# Patient Record
Sex: FEMALE | Race: WHITE | Employment: OTHER | URBAN - METROPOLITAN AREA
[De-identification: names, ages, dates, MRNs, and addresses within clinical notes are randomized per-mention and may not be internally consistent; named-entity substitution may affect disease eponyms.]

---

## 2017-02-23 ENCOUNTER — APPOINTMENT (OUTPATIENT)
Dept: INFUSION THERAPY | Age: 77
End: 2017-02-23

## 2017-05-23 ENCOUNTER — APPOINTMENT (OUTPATIENT)
Dept: GENERAL RADIOLOGY | Age: 77
End: 2017-05-23
Attending: EMERGENCY MEDICINE
Payer: MEDICARE

## 2017-05-23 ENCOUNTER — HOSPITAL ENCOUNTER (EMERGENCY)
Age: 77
Discharge: HOME | End: 2017-05-23
Attending: EMERGENCY MEDICINE | Admitting: EMERGENCY MEDICINE
Payer: MEDICARE

## 2017-05-23 VITALS
BODY MASS INDEX: 23.39 KG/M2 | WEIGHT: 137 LBS | DIASTOLIC BLOOD PRESSURE: 68 MMHG | HEIGHT: 64 IN | HEART RATE: 77 BPM | RESPIRATION RATE: 22 BRPM | OXYGEN SATURATION: 97 % | TEMPERATURE: 98.4 F | SYSTOLIC BLOOD PRESSURE: 140 MMHG

## 2017-05-23 DIAGNOSIS — R07.9 CHEST PAIN, UNSPECIFIED TYPE: Primary | ICD-10-CM

## 2017-05-23 LAB
ANION GAP BLD CALC-SCNC: 9 MMOL/L (ref 5–15)
BASOPHILS # BLD AUTO: 0 K/UL (ref 0–0.1)
BASOPHILS # BLD: 0 % (ref 0–1)
BUN SERPL-MCNC: 21 MG/DL (ref 6–20)
BUN/CREAT SERPL: 27 (ref 12–20)
CALCIUM SERPL-MCNC: 9.9 MG/DL (ref 8.5–10.1)
CHLORIDE SERPL-SCNC: 106 MMOL/L (ref 97–108)
CO2 SERPL-SCNC: 24 MMOL/L (ref 21–32)
CREAT SERPL-MCNC: 0.78 MG/DL (ref 0.55–1.02)
EOSINOPHIL # BLD: 0.1 K/UL (ref 0–0.4)
EOSINOPHIL NFR BLD: 2 % (ref 0–7)
ERYTHROCYTE [DISTWIDTH] IN BLOOD BY AUTOMATED COUNT: 12.9 % (ref 11.5–14.5)
GLUCOSE SERPL-MCNC: 115 MG/DL (ref 65–100)
HCT VFR BLD AUTO: 40.3 % (ref 35–47)
HGB BLD-MCNC: 13.2 G/DL (ref 11.5–16)
LYMPHOCYTES # BLD AUTO: 30 % (ref 12–49)
LYMPHOCYTES # BLD: 2 K/UL (ref 0.8–3.5)
MAGNESIUM SERPL-MCNC: 2.3 MG/DL (ref 1.6–2.4)
MCH RBC QN AUTO: 31.3 PG (ref 26–34)
MCHC RBC AUTO-ENTMCNC: 32.8 G/DL (ref 30–36.5)
MCV RBC AUTO: 95.5 FL (ref 80–99)
MONOCYTES # BLD: 0.7 K/UL (ref 0–1)
MONOCYTES NFR BLD AUTO: 10 % (ref 5–13)
NEUTS SEG # BLD: 4 K/UL (ref 1.8–8)
NEUTS SEG NFR BLD AUTO: 58 % (ref 32–75)
PLATELET # BLD AUTO: 231 K/UL (ref 150–400)
POTASSIUM SERPL-SCNC: 4 MMOL/L (ref 3.5–5.1)
RBC # BLD AUTO: 4.22 M/UL (ref 3.8–5.2)
SODIUM SERPL-SCNC: 139 MMOL/L (ref 136–145)
TROPONIN I SERPL-MCNC: <0.04 NG/ML
WBC # BLD AUTO: 6.8 K/UL (ref 3.6–11)

## 2017-05-23 PROCEDURE — 85025 COMPLETE CBC W/AUTO DIFF WBC: CPT | Performed by: EMERGENCY MEDICINE

## 2017-05-23 PROCEDURE — 93005 ELECTROCARDIOGRAM TRACING: CPT

## 2017-05-23 PROCEDURE — 99284 EMERGENCY DEPT VISIT MOD MDM: CPT

## 2017-05-23 PROCEDURE — 80048 BASIC METABOLIC PNL TOTAL CA: CPT | Performed by: EMERGENCY MEDICINE

## 2017-05-23 PROCEDURE — 71020 XR CHEST PA LAT: CPT

## 2017-05-23 PROCEDURE — 84484 ASSAY OF TROPONIN QUANT: CPT | Performed by: EMERGENCY MEDICINE

## 2017-05-23 PROCEDURE — 36415 COLL VENOUS BLD VENIPUNCTURE: CPT | Performed by: EMERGENCY MEDICINE

## 2017-05-23 PROCEDURE — 83735 ASSAY OF MAGNESIUM: CPT | Performed by: EMERGENCY MEDICINE

## 2017-05-24 LAB
ATRIAL RATE: 82 BPM
CALCULATED P AXIS, ECG09: 55 DEGREES
CALCULATED R AXIS, ECG10: -9 DEGREES
CALCULATED T AXIS, ECG11: 3 DEGREES
DIAGNOSIS, 93000: NORMAL
P-R INTERVAL, ECG05: 166 MS
Q-T INTERVAL, ECG07: 380 MS
QRS DURATION, ECG06: 94 MS
QTC CALCULATION (BEZET), ECG08: 443 MS
VENTRICULAR RATE, ECG03: 82 BPM

## 2017-05-24 NOTE — ED NOTES
ED EKG interpretation:  Rhythm: normal sinus rhythm; and regular . Rate (approx.): 82 bpm; Axis: normal; ST/T wave: normal; LVH.    Note written by Beth Thrasher, as dictated by Dr. Christi Espinoza MD 11:21 PM

## 2017-05-24 NOTE — ED NOTES
I have reviewed discharge instructions with the patient. The patient verbalized understanding.  Ambulatory off unit in no acute signs of distress

## 2017-05-24 NOTE — DISCHARGE INSTRUCTIONS
Chest Pain: Care Instructions  Your Care Instructions  There are many things that can cause chest pain. Some are not serious and will get better on their own in a few days. But some kinds of chest pain need more testing and treatment. Your doctor may have recommended a follow-up visit in the next 8 to 12 hours. If you are not getting better, you may need more tests or treatment. Even though your doctor has released you, you still need to watch for any problems. The doctor carefully checked you, but sometimes problems can develop later. If you have new symptoms or if your symptoms do not get better, get medical care right away. If you have worse or different chest pain or pressure that lasts more than 5 minutes or you passed out (lost consciousness), call 911 or seek other emergency help right away. A medical visit is only one step in your treatment. Even if you feel better, you still need to do what your doctor recommends, such as going to all suggested follow-up appointments and taking medicines exactly as directed. This will help you recover and help prevent future problems. How can you care for yourself at home? · Rest until you feel better. · Take your medicine exactly as prescribed. Call your doctor if you think you are having a problem with your medicine. · Do not drive after taking a prescription pain medicine. When should you call for help? Call 911 if:  · You passed out (lost consciousness). · You have severe difficulty breathing. · You have symptoms of a heart attack. These may include:  ¨ Chest pain or pressure, or a strange feeling in your chest.  ¨ Sweating. ¨ Shortness of breath. ¨ Nausea or vomiting. ¨ Pain, pressure, or a strange feeling in your back, neck, jaw, or upper belly or in one or both shoulders or arms. ¨ Lightheadedness or sudden weakness. ¨ A fast or irregular heartbeat.   After you call 911, the  may tell you to chew 1 adult-strength or 2 to 4 low-dose aspirin. Wait for an ambulance. Do not try to drive yourself. Call your doctor today if:  · You have any trouble breathing. · Your chest pain gets worse. · You are dizzy or lightheaded, or you feel like you may faint. · You are not getting better as expected. · You are having new or different chest pain. Where can you learn more? Go to http://kaycee-declan.info/. Enter A120 in the search box to learn more about \"Chest Pain: Care Instructions. \"  Current as of: May 27, 2016  Content Version: 11.2  © 3906-3271 [employer]. Care instructions adapted under license by [employer] (which disclaims liability or warranty for this information). If you have questions about a medical condition or this instruction, always ask your healthcare professional. [address] any warranty or liability for your use of this information. We hope that we have addressed all of your medical concerns. The examination and treatment you received in the Emergency Department were for an emergent problem and were not intended as complete care. It is important that you follow up with your healthcare provider(s) for ongoing care. If your symptoms worsen or do not improve as expected, and you are unable to reach your usual health care provider(s), you should return to the Emergency Department. Today's healthcare is undergoing tremendous change, and patient satisfaction surveys are one of the many tools to assess the quality of medical care. You may receive a survey from the [employer] organization regarding your experience in the Emergency Department. I hope that your experience has been completely positive, particularly the medical care that I provided. As such, please participate in the survey; anything less than excellent does not meet my expectations or intentions.         [facility] and [facility] participate in nationally recognized quality of care measures. If your blood pressure is greater than 120/80, as reported below, we urge that you seek medical care to address the potential of high blood pressure, commonly known as hypertension. Hypertension can be hereditary or can be caused by certain medical conditions, pain, stress, or \"white coat syndrome. \"       Please make an appointment with your health care provider(s) for follow up of your Emergency Department visit. VITALS:   Patient Vitals for the past 8 hrs:   Temp Pulse Resp BP SpO2   05/23/17 2139 - 91 20 141/85 94 %   05/23/17 2002 97.8 °F (36.6 °C) 81 18 137/81 95 %          Thank you for allowing us to provide you with medical care today. We realize that you have many choices for your emergency care needs. Please choose us in the future for any continued health care needs. Zach Hagan, [address] [place] [address] 20.   Office: [phone]            Recent Results (from the past 24 hour(s))   EKG, 12 LEAD, INITIAL    Collection Time: 05/23/17  8:19 PM   Result Value Ref Range    Ventricular Rate 82 BPM    Atrial Rate 82 BPM    P-R Interval 166 ms    QRS Duration 94 ms    Q-T Interval 380 ms    QTC Calculation (Bezet) 443 ms    Calculated P Axis 55 degrees    Calculated R Axis -9 degrees    Calculated T Axis 3 degrees    Diagnosis       Normal sinus rhythm  Left ventricular hypertrophy  No previous ECGs available     CBC WITH AUTOMATED DIFF    Collection Time: 05/23/17 10:04 PM   Result Value Ref Range    WBC 6.8 3.6 - 11.0 K/uL    RBC 4.22 3.80 - 5.20 M/uL    HGB 13.2 11.5 - 16.0 g/dL    HCT 40.3 35.0 - 47.0 %    MCV 95.5 80.0 - 99.0 FL    MCH 31.3 26.0 - 34.0 PG    MCHC 32.8 30.0 - 36.5 g/dL    RDW 12.9 11.5 - 14.5 %    PLATELET 720 444 - 111 K/uL    NEUTROPHILS 58 32 - 75 %    LYMPHOCYTES 30 12 - 49 %    MONOCYTES 10 5 - 13 %    EOSINOPHILS 2 0 - 7 %    BASOPHILS 0 0 - 1 %    ABS.  NEUTROPHILS 4.0 1.8 - 8.0 K/UL    ABS. LYMPHOCYTES 2.0 0.8 - 3.5 K/UL    ABS. MONOCYTES 0.7 0.0 - 1.0 K/UL    ABS. EOSINOPHILS 0.1 0.0 - 0.4 K/UL    ABS. BASOPHILS 0.0 0.0 - 0.1 K/UL   MAGNESIUM    Collection Time: 05/23/17 10:04 PM   Result Value Ref Range    Magnesium 2.3 1.6 - 2.4 mg/dL   METABOLIC PANEL, BASIC    Collection Time: 05/23/17 10:04 PM   Result Value Ref Range    Sodium 139 136 - 145 mmol/L    Potassium 4.0 3.5 - 5.1 mmol/L    Chloride 106 97 - 108 mmol/L    CO2 24 21 - 32 mmol/L    Anion gap 9 5 - 15 mmol/L    Glucose 115 (H) 65 - 100 mg/dL    BUN 21 (H) 6 - 20 MG/DL    Creatinine 0.78 0.55 - 1.02 MG/DL    BUN/Creatinine ratio 27 (H) 12 - 20      GFR est AA >60 >60 ml/min/1.73m2    GFR est non-AA >60 >60 ml/min/1.73m2    Calcium 9.9 8.5 - 10.1 MG/DL   TROPONIN I    Collection Time: 05/23/17 10:04 PM   Result Value Ref Range    Troponin-I, Qt. <0.04 <0.05 ng/mL       Xr Chest Pa Lat    Result Date: 5/23/2017  EXAM:  XR CHEST PA LAT INDICATION:  Chest pain. COMPARISON: None TECHNIQUE: PA and lateral chest views FINDINGS: Cardiac monitoring wires overlie the thorax. The cardiomediastinal and hilar contours are within normal limits. The pulmonary vasculature is within normal limits. The lungs and pleural spaces are clear. A calcified granuloma is noted in the right upper lung. The visualized bones and upper abdomen are age-appropriate. IMPRESSION: There is no acute process.

## 2017-05-24 NOTE — ED PROVIDER NOTES
HPI Comments: 67 y.o. female with past medical history significant for HTN and hypercholesterolemia who presents to the ED with chief complaint of vague chest sensation. Patient reports a \"weird sensation\" in her chest and bilateral arms that lasted for \"a few seconds\" with onset at ~1600 \"this afternoon. \" Patient reports \"two\" previous episodes of similar symptoms; reports experiencing one episode ~2 weeks ago and a second episode \"a few days after that. \" Patient reports \"it's really hard to describe. \" Patient also reports a slight headache with recent onset. Patient reports her late 's cardiologist was Dr. Dwight Leija MD. Patient denies pain, SOB, nausea, vomiting, diarrhea, constipation, diaphoresis, leg swelling, fever, chills, dysuria, and lightheadedness. There are no other acute medical concerns at this time. PCP: Farrukh Major MD    Note written by Beth Rebolledo, as dictated by Aida Prieto. Dank Armijo MD 9:42 PM     The history is provided by the patient. Past Medical History:   Diagnosis Date    Hypercholesteremia     Hypertension        Past Surgical History:   Procedure Laterality Date    HX ORTHOPAEDIC      trigger finger         History reviewed. No pertinent family history. Social History     Social History    Marital status:      Spouse name: N/A    Number of children: N/A    Years of education: N/A     Occupational History    Not on file. Social History Main Topics    Smoking status: Never Smoker    Smokeless tobacco: Not on file    Alcohol use No    Drug use: No    Sexual activity: Not on file     Other Topics Concern    Not on file     Social History Narrative         ALLERGIES: Aspirin; Cephalexin; and Penicillins    Review of Systems   Constitutional: Negative for chills, diaphoresis and fever. HENT: Negative for ear pain and sore throat. Eyes: Negative for pain. Respiratory: Negative for chest tightness and shortness of breath. Cardiovascular: Negative for chest pain and leg swelling. Gastrointestinal: Negative for abdominal pain, constipation, diarrhea, nausea and vomiting. Genitourinary: Negative for dysuria and flank pain. Musculoskeletal: Negative for back pain. Skin: Negative for rash. Neurological: Positive for headaches. Negative for light-headedness. All other systems reviewed and are negative. Vitals:    05/23/17 2002 05/23/17 2139   BP:  141/85   Pulse:  91   Resp:  20   SpO2:  94%   Weight: 62.1 kg (137 lb)    Height: 5' 4\" (1.626 m)             Physical Exam   Constitutional: She appears well-developed and well-nourished. HENT:   Head: Normocephalic and atraumatic. Mouth/Throat: Oropharynx is clear and moist.   Eyes: Conjunctivae and EOM are normal. Pupils are equal, round, and reactive to light. No scleral icterus. Neck: Neck supple. No tracheal deviation present. Cardiovascular: Normal rate, regular rhythm, normal heart sounds and intact distal pulses. Regular rate and rhythm. Pulmonary/Chest: Effort normal and breath sounds normal. No respiratory distress. Lungs are clear. Abdominal: Soft. She exhibits no distension. There is no tenderness. There is no rebound and no guarding. Genitourinary:   Genitourinary Comments: deferred   Musculoskeletal: She exhibits no edema. Neurological: She is alert. Skin: Skin is warm and dry. Psychiatric: She has a normal mood and affect. Nursing note and vitals reviewed. Note written by Beth Goetz, as dictated by Cheryl Calderón. Jennifer Obregon MD 10:07 PM      MDM  Number of Diagnoses or Management Options  Chest pain, unspecified type:   Diagnosis management comments: DDx: ACS,MI, GERD    ED Course     11:19 PM  Pt has been reevaluated. The patient is ready for discharge. The patient's signs, symptoms, diagnosis, and discharge instructions have been discussed and the patient/ family has conveyed their understanding.  The patient is to follow up as recommended or return to the ER should their symptoms worsen. Plan has been discussed and the patient is in agreement. LABORATORY TESTS:  Recent Results (from the past 12 hour(s))   EKG, 12 LEAD, INITIAL    Collection Time: 05/23/17  8:19 PM   Result Value Ref Range    Ventricular Rate 82 BPM    Atrial Rate 82 BPM    P-R Interval 166 ms    QRS Duration 94 ms    Q-T Interval 380 ms    QTC Calculation (Bezet) 443 ms    Calculated P Axis 55 degrees    Calculated R Axis -9 degrees    Calculated T Axis 3 degrees    Diagnosis       Normal sinus rhythm  Left ventricular hypertrophy  No previous ECGs available     CBC WITH AUTOMATED DIFF    Collection Time: 05/23/17 10:04 PM   Result Value Ref Range    WBC 6.8 3.6 - 11.0 K/uL    RBC 4.22 3.80 - 5.20 M/uL    HGB 13.2 11.5 - 16.0 g/dL    HCT 40.3 35.0 - 47.0 %    MCV 95.5 80.0 - 99.0 FL    MCH 31.3 26.0 - 34.0 PG    MCHC 32.8 30.0 - 36.5 g/dL    RDW 12.9 11.5 - 14.5 %    PLATELET 825 127 - 006 K/uL    NEUTROPHILS 58 32 - 75 %    LYMPHOCYTES 30 12 - 49 %    MONOCYTES 10 5 - 13 %    EOSINOPHILS 2 0 - 7 %    BASOPHILS 0 0 - 1 %    ABS. NEUTROPHILS 4.0 1.8 - 8.0 K/UL    ABS. LYMPHOCYTES 2.0 0.8 - 3.5 K/UL    ABS. MONOCYTES 0.7 0.0 - 1.0 K/UL    ABS. EOSINOPHILS 0.1 0.0 - 0.4 K/UL    ABS.  BASOPHILS 0.0 0.0 - 0.1 K/UL   MAGNESIUM    Collection Time: 05/23/17 10:04 PM   Result Value Ref Range    Magnesium 2.3 1.6 - 2.4 mg/dL   METABOLIC PANEL, BASIC    Collection Time: 05/23/17 10:04 PM   Result Value Ref Range    Sodium 139 136 - 145 mmol/L    Potassium 4.0 3.5 - 5.1 mmol/L    Chloride 106 97 - 108 mmol/L    CO2 24 21 - 32 mmol/L    Anion gap 9 5 - 15 mmol/L    Glucose 115 (H) 65 - 100 mg/dL    BUN 21 (H) 6 - 20 MG/DL    Creatinine 0.78 0.55 - 1.02 MG/DL    BUN/Creatinine ratio 27 (H) 12 - 20      GFR est AA >60 >60 ml/min/1.73m2    GFR est non-AA >60 >60 ml/min/1.73m2    Calcium 9.9 8.5 - 10.1 MG/DL   TROPONIN I    Collection Time: 05/23/17 10:04 PM   Result Value Ref Range    Troponin-I, Qt. <0.04 <0.05 ng/mL       IMAGING RESULTS:  XR CHEST PA LAT   Final Result        Xr Chest Pa Lat    Result Date: 5/23/2017  EXAM:  XR CHEST PA LAT INDICATION:  Chest pain. COMPARISON: None TECHNIQUE: PA and lateral chest views FINDINGS: Cardiac monitoring wires overlie the thorax. The cardiomediastinal and hilar contours are within normal limits. The pulmonary vasculature is within normal limits. The lungs and pleural spaces are clear. A calcified granuloma is noted in the right upper lung. The visualized bones and upper abdomen are age-appropriate. IMPRESSION: There is no acute process. MEDICATIONS GIVEN:  Medications - No data to display    IMPRESSION:  1. Chest pain, unspecified type        PLAN:  1. Discharge Medication List as of 5/23/2017 11:00 PM        2. Follow-up Information     Follow up With Details Comments Contact Info    Josué Wells MD Call in 1 day  [address]  [address] [place] [address]      Yen Berrios MD Call in 1 day  [address] [facility] [address] [facility] [address]  [phone]      [facility] [address] [facility]  If symptoms worsen [address] [facility]  [phone]            Return to ED for new or worsening symptoms       Ailyn Jarquin.  Lele Aguirre MD    Procedures

## 2017-05-24 NOTE — ED TRIAGE NOTES
Pt reports she has gotten this \"sensation\" in her chest and arms 3 times over the last 3 weeks. \"Its really hard to describe. Just out of no where I get this feeling in my chest and both arms. Then it goes right away. \"  Will not use the term \"pain\" or \"discomfort\", its just a \"sensation. \"  No pain or sensation currently. Last episode occurred around 1600 today while patient was taking her shoes off. She is here because she told her family and they want her checked.

## 2017-05-25 ENCOUNTER — OFFICE VISIT (OUTPATIENT)
Dept: CARDIOLOGY CLINIC | Age: 77
End: 2017-05-25

## 2017-05-25 VITALS
HEART RATE: 96 BPM | DIASTOLIC BLOOD PRESSURE: 70 MMHG | OXYGEN SATURATION: 97 % | WEIGHT: 135.6 LBS | SYSTOLIC BLOOD PRESSURE: 120 MMHG | HEIGHT: 64 IN | RESPIRATION RATE: 16 BRPM | BODY MASS INDEX: 23.15 KG/M2

## 2017-05-25 DIAGNOSIS — R07.2 PRECORDIAL PAIN: Primary | ICD-10-CM

## 2017-05-25 DIAGNOSIS — E78.2 MIXED HYPERLIPIDEMIA: ICD-10-CM

## 2017-05-25 DIAGNOSIS — I10 ESSENTIAL HYPERTENSION: ICD-10-CM

## 2017-05-25 NOTE — PROGRESS NOTES
HISTORY OF PRESENT ILLNESS  Maurice Quezada is a 67 y.o. female     SUMMARY:   Problem List  Date Reviewed: 5/25/2017          Codes Class Noted    Precordial pain ICD-10-CM: R07.2  ICD-9-CM: 786.51  5/25/2017        Mixed hyperlipidemia ICD-10-CM: E78.2  ICD-9-CM: 272.2  5/25/2017        Essential hypertension ICD-10-CM: I10  ICD-9-CM: 401.9  5/25/2017              Current Outpatient Prescriptions on File Prior to Visit   Medication Sig    atorvastatin (LIPITOR) 20 mg tablet Take 20 mg by mouth daily.  acetaminophen (TYLENOL) 325 mg tablet Take 650 mg by mouth three (3) times daily (after meals).  lisinopril (PRINIVIL, ZESTRIL) 10 mg tablet Take 10 mg by mouth daily.  furosemide (LASIX) 40 mg tablet Take  by mouth daily as needed.  multivitamin with iron tablet Take 1 Tab by mouth daily. No current facility-administered medications on file prior to visit. CARDIOLOGY STUDIES TO DATE:  none  Chief Complaint   Patient presents with    Chest Pain     HPI :  Ms. Caron Wahl is a 67year old referred from the [facility] ER for cardiac evaluation. Over the last three weeks she has had about three episodes lasting a few seconds where she gets a strange sensation across her upper/lower neck and chest which spreads out to both arms. It is not really pain or pressure, and it is not associated with any other symptoms. It is non-exertional. Nothing seems to make it better or worse. Interestingly she has pretty significant lumbar spine disease but no known cervical spine disease. She also has a history of GERD which is fairly well-controlled on over-the-counter medications. She has longstanding hypertension and hyperlipidemia, she has never smoked, there is no history of diabetes, and family history is negative for premature coronary disease. She tries to stay active but does not exercise, and no symptoms suggestive of exertional angina or congestive heart failure.  She ended up in the emergency room a few days ago where she had a normal lab evaluation and an EKG which showed sinus rhythm, left ventricular hypertrophy, and non-specific ST-T wave changes. CARDIAC ROS:   negative for dyspnea, palpitations, syncope, orthopnea, paroxysmal nocturnal dyspnea, exertional chest pressure/discomfort, claudication, lower extremity edema    Family History   Problem Relation Age of Onset    Stroke Mother     Heart Attack Father      age [de-identified]       Past Medical History:   Diagnosis Date    Hypercholesteremia     Hypertension        GENERAL ROS:  A comprehensive review of systems was negative except for: Musculoskeletal: positive for arthralgias  Behvioral/Psych: positive for anxiety and sleep disturbance    Visit Vitals    /70 (BP 1 Location: Left arm, BP Patient Position: Sitting)    Pulse 96    Resp 16    Ht 5' 4\" (1.626 m)    Wt 135 lb 9.6 oz (61.5 kg)    SpO2 97%    BMI 23.28 kg/m2       Wt Readings from Last 3 Encounters:   05/25/17 135 lb 9.6 oz (61.5 kg)   05/23/17 137 lb (62.1 kg)            BP Readings from Last 3 Encounters:   05/25/17 120/70   05/23/17 140/68       PHYSICAL EXAM  General appearance: alert, cooperative, no distress, appears stated age  Neurologic: Alert and oriented X 3  Neck: supple, symmetrical, trachea midline, no adenopathy, no carotid bruit and no JVD  Lungs: clear to auscultation bilaterally  Heart: regular rate and rhythm, S1, S2 normal, no murmur, click, rub or gallop  Abdomen: soft, non-tender. Bowel sounds normal. No masses,  no organomegaly  Extremities: extremities normal, atraumatic, no cyanosis or edema  Pulses: 2+ and symmetric      ASSESSMENT  Ms. Susie Paulino' symptoms are not really cardiac as best I can tell, though she does not get a lot of regular exercise, and she is quite concerned about this. I think this may turn out to be anxiety or possibly neurologic, and I am wondering if she has developed some cervical spine disease given her history of lower back issues.  That being said we need to do a stress echo on her to sort all of this out and further stratify her. Hopefully she will be able to walk on the treadmill, otherwise we will have to do a pharmacologic nuclear stress test, and I talked to her and her daughter about that at length. current treatment plan is effective, no change in therapy  lab results and schedule of future lab studies reviewed with patient  reviewed diet, exercise and weight control    Encounter Diagnoses   Name Primary?  Precordial pain Yes    Mixed hyperlipidemia     Essential hypertension      No orders of the defined types were placed in this encounter. Follow-up Disposition:  Return in about 4 weeks (around 6/22/2017).     Yi Chávez MD  5/25/2017

## 2017-05-25 NOTE — MR AVS SNAPSHOT
Visit Information Date & Time Provider Department Dept. Phone Encounter #  
 5/25/2017  8:20 AM Erika Traylor MD [facility] Julio [phone] [id number] Follow-up Instructions Return in about 4 weeks (around 6/22/2017). Upcoming Health Maintenance Date Due DTaP/Tdap/Td series (1 - Tdap) 11/22/1960 ZOSTER VACCINE AGE 60> 11/22/1999 GLAUCOMA SCREENING Q2Y 11/22/2004 Pneumococcal 65+ Low/Medium Risk (1 of 2 - PCV13) 11/22/2004 MEDICARE YEARLY EXAM 11/22/2004 INFLUENZA AGE 9 TO ADULT 8/1/2017 Allergies as of 5/25/2017  Review Complete On: 5/25/2017 By: Erika Traylor MD  
  
 Severity Noted Reaction Type Reactions Aspirin  03/08/2016    Other (comments)  
 ulcers Cephalexin  03/08/2016    Hives Penicillins  12/13/2011    Anaphylaxis Current Immunizations  Never Reviewed No immunizations on file. Not reviewed this visit You Were Diagnosed With   
  
 Codes Comments Precordial pain    -  Primary ICD-10-CM: R07.2 ICD-9-CM: 786.51 Mixed hyperlipidemia     ICD-10-CM: E78.2 ICD-9-CM: 272.2 Essential hypertension     ICD-10-CM: I10 
ICD-9-CM: 401.9 Vitals BP Pulse Resp Height(growth percentile) Weight(growth percentile) SpO2  
 120/70 (BP 1 Location: Left arm, BP Patient Position: Sitting) 96 16 5' 4\" (1.626 m) 135 lb 9.6 oz (61.5 kg) 97% BMI OB Status Smoking Status 23.28 kg/m2 Menopause Never Smoker BMI and BSA Data Body Mass Index Body Surface Area  
 23.28 kg/m 2 1.67 m 2 Preferred Pharmacy Pharmacy Name Phone [phone] [phone] Your Updated Medication List  
  
   
This list is accurate as of: 5/25/17  8:53 AM.  Always use your most recent med list.  
  
  
  
  
 acetaminophen 325 mg tablet Commonly known as:  TYLENOL  
 Take 650 mg by mouth three (3) times daily (after meals). atorvastatin 20 mg tablet Commonly known as:  LIPITOR Take 20 mg by mouth daily. furosemide 40 mg tablet Commonly known as:  LASIX Take  by mouth daily as needed. lisinopril 10 mg tablet Commonly known as:  Jovon Lofty Take 10 mg by mouth daily. multivitamin with iron tablet Take 1 Tab by mouth daily. Follow-up Instructions Return in about 4 weeks (around 6/22/2017). Introducing [facility] SERVICES! Dear Jhon Madrid: Thank you for requesting a [employer] account. Our records indicate that you have previously registered for a [employer] account but its currently inactive. Please call our [employer] support line at [phone]. Additional Information If you have questions, please visit the Frequently Asked Questions section of the [employer] website at [email or web address] Remember, [employer] is NOT to be used for urgent needs. For medical emergencies, dial 911. Now available from your iPhone and Android! Please provide this summary of care documentation to your next provider. Your primary care clinician is listed as Tina Knapp. If you have any questions after today's visit, please call [phone].

## 2017-06-12 ENCOUNTER — CLINICAL SUPPORT (OUTPATIENT)
Dept: CARDIOLOGY CLINIC | Age: 77
End: 2017-06-12

## 2017-06-12 DIAGNOSIS — I10 ESSENTIAL HYPERTENSION: ICD-10-CM

## 2017-06-12 DIAGNOSIS — E78.2 MIXED HYPERLIPIDEMIA: ICD-10-CM

## 2017-06-12 DIAGNOSIS — R07.2 PRECORDIAL PAIN: ICD-10-CM

## 2019-05-07 ENCOUNTER — HOSPITAL ENCOUNTER (OUTPATIENT)
Dept: WOUND CARE | Age: 79
Discharge: HOME | End: 2019-05-07
Payer: MEDICARE

## 2019-05-07 VITALS
RESPIRATION RATE: 16 BRPM | TEMPERATURE: 96.9 F | SYSTOLIC BLOOD PRESSURE: 130 MMHG | HEART RATE: 79 BPM | DIASTOLIC BLOOD PRESSURE: 72 MMHG

## 2019-05-07 PROCEDURE — 99213 OFFICE O/P EST LOW 20 MIN: CPT

## 2019-05-07 PROCEDURE — 97597 DBRDMT OPN WND 1ST 20 CM/<: CPT

## 2019-05-07 RX ORDER — FAMOTIDINE 10 MG/1
10 TABLET, FILM COATED ORAL DAILY
COMMUNITY
End: 2021-08-24

## 2019-05-07 RX ORDER — LIDOCAINE 40 MG/G
CREAM TOPICAL AS NEEDED
Status: DISCONTINUED | OUTPATIENT
Start: 2019-05-07 | End: 2019-05-11 | Stop reason: HOSPADM

## 2019-05-07 NOTE — WOUND CARE
05/07/19 0744 Wound Leg Right; Lower; Posterior Date First Assessed/Time First Assessed: 05/07/19 0919   Present on Hospital Admission: Yes  Primary Wound Type: Trauma  Location: Leg  Wound Location Orientation: Right; Lower; Posterior Dressing Status Removed Dressing Type  
(border gauze) Wound Length (cm) 1 cm Wound Width (cm) 0.8 cm Wound Depth (cm) 0.1 cm Wound Volume (cm^3) 0.08 cm^3 Condition of [facility] Condition of Edges Open Drainage Amount Small Drainage Color Serous Wound Odor None Dena-wound Assessment Intact Cleansing and Cleansing Agents  Normal saline Visit Vitals /72 (BP 1 Location: Left arm) Pulse 79 Temp 96.9 °F (36.1 °C) Resp 16

## 2019-05-07 NOTE — WOUND CARE
05/07/19 1039 Wound Leg Right; Lower; Posterior Date First Assessed/Time First Assessed: 05/07/19 0919   Present on Hospital Admission: Yes  Primary Wound Type: Trauma  Location: Leg  Wound Location Orientation: Right; Lower; Posterior Dressing Type Applied Silver products; Alginate;Gauze; Compression Wrap/Venous Stasis (Aquacel Ag, 3 layer A&D) Patient was discharged with Self to home and was ambulatory. In stable condition with c/o pain:_0_/10_

## 2019-05-08 NOTE — PROGRESS NOTES
[facility] 
WOUND CARE PROGRESS NOTE Name:  Oma Crenshaw 
MR#:  [id number] :  1940 ACCOUNT #:  [de-identified] DATE OF SERVICE:  2019 SUBJECTIVE:  The patient returns to the wound center after a prolonged absence with a new right leg venous stasis ulcer. She has a history of multiple venous stasis ulcers of the right leg. This one has been present for 4 months. She believes she banged her leg on a door or something. She denies fevers or chills. She has been treating it with antibiotic ointment. OBJECTIVE:  Physical exam reveals an approximately 1 cm ulcer of the right anterior leg. The base has thick yellow slough. There is mild surrounding erythema. There are obvious chronic venous stasis changes. She has good pedal pulses. ASSESSMENT:  New right leg venous stasis ulcer. Requires selective debridement. PROCEDURE:  Selective debridement. DESCRIPTION OF PROCEDURE:  Under topical anesthesia, nonviable tissue was sharply excised from the base of the right leg ulcer using a ring curette measuring about 1 cm2. Blood loss was less than 5 mL, no specimens or complications. PLAN:  Start wound care with Aquacel AG and three layer wrap. She will follow up next week for a nursing visit. I will see her again in 2 weeks. MD CATHLEEN [id number] 
D:  2019 10:49 T:  2019 10:55 JOB #:  [id number]

## 2019-05-14 ENCOUNTER — HOSPITAL ENCOUNTER (OUTPATIENT)
Dept: WOUND CARE | Age: 79
Discharge: HOME | End: 2019-05-14
Payer: MEDICARE

## 2019-05-14 VITALS
HEART RATE: 81 BPM | DIASTOLIC BLOOD PRESSURE: 76 MMHG | RESPIRATION RATE: 16 BRPM | TEMPERATURE: 97.4 F | SYSTOLIC BLOOD PRESSURE: 131 MMHG

## 2019-05-14 PROCEDURE — 29581 APPL MULTLAYER CMPRN SYS LEG: CPT

## 2019-05-14 NOTE — WOUND CARE
05/14/19 1013 Wound Leg Right; Lower; Posterior Date First Assessed/Time First Assessed: 05/07/19 0919   Present on Hospital Admission: Yes  Primary Wound Type: Trauma  Location: Leg  Wound Location Orientation: Right; Lower; Posterior Dressing Status Removed Dressing Type Compression Wrap/Venous Stasis; Aquacel;4 x 4 Condition of Base Slough;Granulation Condition of Edges Open Drainage Amount Moderate Drainage Color Tan Wound Odor None Dena-wound Assessment Intact Cleansing and Cleansing Agents  Normal saline Dressing Type Applied Silver products; Alginate;4 x 4;Compression Wrap/Venous Stasis 
(A&D , aquacel Ag, 3 layers) Visit Vitals /76 Pulse 81 Temp 97.4 °F (36.3 °C) Resp 16 RLE Peripheral Vascular Capillary Refill: Less than/equal to 3 seconds (05/14/19 1013) Color: Appropriate for race;Pink(Hemosidrin staining) (05/14/19 1013) Temperature: Warm (05/14/19 1013) Sensation: Present (05/14/19 1013) Pedal Pulse: Palpable (05/14/19 1013) Circumference of Calf (cm): 32 cm (05/14/19 1013) Location of Measurement (Calf): Mid  (05/14/19 1013) Circumference of Ankle (cm): 19.5 cm (05/14/19 1013) Location of Measurement (Ankle): Upper  (05/14/19 1013) Patient in for Nurse visit. Patient was discharged with Self to home and was ambulatory. In stable condition with c/o pain:0__/10_

## 2019-05-21 ENCOUNTER — HOSPITAL ENCOUNTER (OUTPATIENT)
Dept: WOUND CARE | Age: 79
Discharge: HOME | End: 2019-05-21
Payer: MEDICARE

## 2019-05-21 VITALS
SYSTOLIC BLOOD PRESSURE: 136 MMHG | DIASTOLIC BLOOD PRESSURE: 82 MMHG | RESPIRATION RATE: 16 BRPM | TEMPERATURE: 96.6 F | HEART RATE: 88 BPM

## 2019-05-21 PROCEDURE — 97597 DBRDMT OPN WND 1ST 20 CM/<: CPT

## 2019-05-21 PROCEDURE — 74011000250 HC RX REV CODE- 250: Performed by: PLASTIC SURGERY

## 2019-05-21 RX ADMIN — Medication: at 10:30

## 2019-05-21 NOTE — WOUND CARE
05/21/19 1036 Wound Leg Right; Lower; Posterior Date First Assessed/Time First Assessed: 05/07/19 0919   Present on Hospital Admission: Yes  Primary Wound Type: Trauma  Location: Leg  Wound Location Orientation: Right; Lower; Posterior Dressing Type Applied (aquacel AG, 4x4's 3 layer wrap) Wound Procedure Type Selective Debridement Procedure Time Out 8786 Consent Obtained  Yes Procedure Bleeding Minimal  
Procedure Hemostasis  Pressure Procedure Instrument  Curette Post-Procedure Length (cm) 1.3 cm Post-Procedure Width (cm) 1.4 cm Post-Procedure Depth (cm) 0.1 cm Post-Procedure Volume (cm^3) 0.18 cm^3 Post-Procedure Surface Area (cm^2) 1.82 cm^2 Procedure Tolerated Fair Discharged from wound center, ambulatory. Wound sensitive, Has follow up appointment in 1 week.

## 2019-05-21 NOTE — WOUND CARE
05/21/19 1014 Wound Leg Right; Lower; Posterior Date First Assessed/Time First Assessed: 05/07/19 0919   Present on Hospital Admission: Yes  Primary Wound Type: Trauma  Location: Leg  Wound Location Orientation: Right; Lower; Posterior Dressing Status Removed Dressing Type Aquacel; Compression Wrap/Venous Stasis Wound Length (cm) 1.3 cm Wound Width (cm) 1.4 cm Wound Depth (cm) 0.1 cm Wound Volume (cm^3) 0.18 cm^3 Condition of Base Slough;Granulation Condition of Edges Open Drainage Amount Moderate Drainage Color Tan;Serosanguinous Wound Odor None Dena-wound Assessment Intact Cleansing and Cleansing Agents  Normal saline Visit Vitals /82 Pulse 88 Temp 96.6 °F (35.9 °C) Resp 16

## 2019-05-28 ENCOUNTER — HOSPITAL ENCOUNTER (OUTPATIENT)
Dept: WOUND CARE | Age: 79
Discharge: HOME | End: 2019-05-28
Payer: MEDICARE

## 2019-05-28 VITALS
DIASTOLIC BLOOD PRESSURE: 86 MMHG | RESPIRATION RATE: 16 BRPM | TEMPERATURE: 97.7 F | SYSTOLIC BLOOD PRESSURE: 157 MMHG | HEART RATE: 80 BPM

## 2019-05-28 PROCEDURE — 74011000250 HC RX REV CODE- 250: Performed by: PLASTIC SURGERY

## 2019-05-28 PROCEDURE — 97597 DBRDMT OPN WND 1ST 20 CM/<: CPT

## 2019-05-28 RX ADMIN — Medication: at 11:06

## 2019-05-28 NOTE — WOUND CARE
OUTPATIENT WOUND CARE DISCHARGE NOTE 
 
 
 05/28/19 1125 Wound Leg Right; Lower; Posterior Date First Assessed/Time First Assessed: 05/07/19 0919   Present on Hospital Admission: Yes  Primary Wound Type: Trauma  Location: Leg  Wound Location Orientation: Right; Lower; Posterior Cleansing and Cleansing Agents  Normal saline Dressing Type Applied Alginate;Silver products;4 x 4;Compression Wrap/Venous Stasis (Aquacel Ag, 4x4s, 3 layer compression wrap.) Wound Procedure Type Selective Debridement Procedure Time Out 4672 Consent Obtained  Yes Procedure Bleeding Minimal  
Procedure Hemostasis  Pressure Type of Tissue Removed  Devitalized Procedure Instrument  Curette Procedure Pain Scale Numeric 0/10 Debridement Procedure Performed by Dr. Humberto Gimenez Post-Procedure Length (cm) 1.3 cm Post-Procedure Width (cm) 1 cm Post-Procedure Depth (cm) 0.1 cm Post-Procedure Volume (cm^3) 0.13 cm^3 Post-Procedure Surface Area (cm^2) 1.3 cm^2 Post Procedure Pain Scale Numeric 0/10 Procedure Tolerated Well Wound Dressing Applied - Per order, as noted above. Pain - 0 Ambulatory Aid - None Accompanied by - Self Follow Up Appointments - Nurse visit in 1 week. F/U w/Dr. Humberto Gimenez in 3 weeks (following her vacation). Discharge Instructions Reviewed. Instructed to call The [address] with any questions or concerns. Patient Verbalizes understanding.

## 2019-05-28 NOTE — WOUND CARE
05/28/19 1100 Wound Leg Right; Lower; Posterior Date First Assessed/Time First Assessed: 05/07/19 0919   Present on Hospital Admission: Yes  Primary Wound Type: Trauma  Location: Leg  Wound Location Orientation: Right; Lower; Posterior Dressing Status Removed Dressing Type Aquacel; Compression Wrap/Venous Stasis;4 x 4 Wound Length (cm) 1.3 cm Wound Width (cm) 1 cm Wound Depth (cm) 0.1 cm Wound Volume (cm^3) 0.13 cm^3 Condition of Base Granulation;Slough Condition of Edges Open Drainage Amount Small Drainage Color Serosanguinous Wound Odor None Dena-wound Assessment Intact

## 2019-06-04 ENCOUNTER — HOSPITAL ENCOUNTER (OUTPATIENT)
Dept: WOUND CARE | Age: 79
Discharge: HOME | End: 2019-06-04
Payer: MEDICARE

## 2019-06-04 VITALS
TEMPERATURE: 98 F | RESPIRATION RATE: 16 BRPM | DIASTOLIC BLOOD PRESSURE: 87 MMHG | SYSTOLIC BLOOD PRESSURE: 151 MMHG | HEART RATE: 81 BPM

## 2019-06-04 PROCEDURE — 99211 OFF/OP EST MAY X REQ PHY/QHP: CPT

## 2019-06-04 NOTE — WOUND CARE
06/04/19 1037   Wound Leg Right; Lower; Posterior   Date First Assessed/Time First Assessed: 05/07/19 0919   Present on Hospital Admission: Yes  Primary Wound Type: Trauma  Location: Leg  Wound Location Orientation: Right; Lower; Posterior   Dressing Status Removed   Dressing Type Aquacel; Compression Wrap/Venous Stasis   Condition of Base Pink   Condition of Edges Open   Drainage Amount Scant   Drainage Color Serosanguinous   Wound Odor None   Dena-wound Assessment Intact   Cleansing and Cleansing Agents  Normal saline   Dressing Type Applied Gauze wrap (mirta); Special tape (comment)  (Aquacel Ag,A&D intact skin, tubi E X 2)     Patient for Nurse Visit. Visit Vitals  /87 (BP 1 Location: Left arm, BP Patient Position: Post activity;Standing)   Pulse 81   Temp 98 °F (36.7 °C)   Resp 16        RLE Peripheral Vascular   Capillary Refill: Less than/equal to 3 seconds (06/04/19 1057)  Color: Appropriate for race (06/04/19 1057)  Temperature: Warm (06/04/19 1057)  Sensation: Present (06/04/19 1057)  Pedal Pulse: Present (06/04/19 1057)      Patient was discharged with Self to home and was ambulatory.  In stable condition with c/o pain:__0/10_

## 2019-06-06 ENCOUNTER — HOSPITAL ENCOUNTER (OUTPATIENT)
Dept: WOUND CARE | Age: 79
Discharge: HOME | End: 2019-06-06
Payer: MEDICARE

## 2019-06-06 PROCEDURE — 99211 OFF/OP EST MAY X REQ PHY/QHP: CPT

## 2019-06-06 NOTE — WOUND CARE
06/06/19 1305 Wound Leg Right; Lower; Posterior Date First Assessed/Time First Assessed: 05/07/19 0919   Present on Hospital Admission: Yes  Primary Wound Type: Trauma  Location: Leg  Wound Location Orientation: Right; Lower; Posterior Dressing Type Aquacel;Gauze;Gauze wrap (mirta); Special tape (comment) 
(tubi x2) Dressing Type Applied Xeroform;Gauze;Gauze wrap (mirta); Special tape (comment) (Tubi X 2) Patient was discharged with Self to home and was ambulatory. In stable condition with c/o pain:_0_/10_

## 2019-06-18 ENCOUNTER — HOSPITAL ENCOUNTER (OUTPATIENT)
Dept: WOUND CARE | Age: 79
Discharge: HOME | End: 2019-06-18
Payer: MEDICARE

## 2019-06-18 VITALS
HEART RATE: 79 BPM | DIASTOLIC BLOOD PRESSURE: 79 MMHG | SYSTOLIC BLOOD PRESSURE: 134 MMHG | RESPIRATION RATE: 16 BRPM | TEMPERATURE: 97.6 F

## 2019-06-18 PROCEDURE — 99214 OFFICE O/P EST MOD 30 MIN: CPT

## 2019-06-18 NOTE — WOUND CARE
OUTPATIENT WOUND CARE DISCHARGE NOTE 
 
 
 06/18/19 1040 Wound Leg Right; Lower; Posterior Date First Assessed/Time First Assessed: 05/07/19 0919   Present on Hospital Admission: Yes  Primary Wound Type: Trauma  Location: Leg  Wound Location Orientation: Right; Lower; Posterior Dressing Type Applied Other (Comment) (No wound dsg needed. Double layer Tubi-, size E, only.) Wound Dressing Applied - No dressing needed. Wound has heeled. Double layer Tubi-, size E, applied per order. Patient instructed to wear bilateral compression hose daily. Pain -  Denies pain. Ambulatory Aid - None Accompanied by - Self Follow Up Appointments - No follow up needed at this time. Wound has healed. Discharge Instructions Reviewed. Instructed to call The [address] [place] [address] Road with any questions or concerns. Patient Verbalizes understanding. Patient discharged from [address] [place] [address].

## 2019-06-18 NOTE — WOUND CARE
06/18/19 1028 Wound Leg Right; Lower; Posterior Date First Assessed/Time First Assessed: 05/07/19 0919   Present on Hospital Admission: Yes  Primary Wound Type: Trauma  Location: Leg  Wound Location Orientation: Right; Lower; Posterior Dressing Status Removed Dressing Type  
(Xeroform, roll gauze, tubigrip) Wound Length (cm) 0 cm Wound Width (cm) 0 cm Wound Depth (cm) 0 cm Wound Volume (cm^3) 0 cm^3 Condition of Base Epithelializing Condition of Edges Open Drainage Amount None Wound Odor None Dena-wound Assessment Intact Cleansing and Cleansing Agents  Normal saline; Soap and water Visit Vitals /79 (BP 1 Location: Left arm, BP Patient Position: At rest;Sitting) Pulse 79 Temp 97.6 °F (36.4 °C) Resp 16

## 2019-07-03 NOTE — PROGRESS NOTES
[address] [facility]  WOUND CARE PROGRESS NOTE    Name:  Regan Quinones  MR#:  [id number]  :  1940  ACCOUNT #:  [de-identified]  DATE OF SERVICE:  2019      SUBJECTIVE:  The patient returns with a chronic right leg venous stasis ulcer that has healed. She has no new complaints. OBJECTIVE:  Physical exam reveals a healed ulcer with no open wound or evidence of infection. ASSESSMENT AND PLAN:  Healed ulcer. No additional treatment at [address] [facility] required. Discharge from clinic. We recommend daily compression stockings. Followup p.r.n.       MD CATHLEEN Castro/[email or web address]  D:  2019 11:56  T:  2019 19:06  JOB #:  [id number]

## 2020-02-08 ENCOUNTER — HOSPITAL ENCOUNTER (OUTPATIENT)
Dept: GENERAL RADIOLOGY | Age: 80
Discharge: HOME | End: 2020-02-08
Payer: MEDICARE

## 2020-02-08 DIAGNOSIS — R52 PAIN: ICD-10-CM

## 2020-02-08 DIAGNOSIS — M41.9 SCOLIOSIS: ICD-10-CM

## 2020-02-08 PROCEDURE — 73562 X-RAY EXAM OF KNEE 3: CPT

## 2020-02-08 PROCEDURE — 72110 X-RAY EXAM L-2 SPINE 4/>VWS: CPT

## 2020-02-08 PROCEDURE — 72081 X-RAY EXAM ENTIRE SPI 1 VW: CPT

## 2021-08-17 ENCOUNTER — HOSPITAL ENCOUNTER (OUTPATIENT)
Dept: WOUND CARE | Age: 81
Discharge: HOME | End: 2021-08-17
Payer: MEDICARE

## 2021-08-17 VITALS
TEMPERATURE: 97.6 F | DIASTOLIC BLOOD PRESSURE: 74 MMHG | SYSTOLIC BLOOD PRESSURE: 160 MMHG | HEART RATE: 95 BPM | RESPIRATION RATE: 16 BRPM

## 2021-08-17 PROCEDURE — 99213 OFFICE O/P EST LOW 20 MIN: CPT

## 2021-08-17 RX ORDER — FAMOTIDINE 40 MG/1
40 TABLET, FILM COATED ORAL 2 TIMES DAILY
Status: ON HOLD | COMMUNITY
End: 2022-04-18 | Stop reason: SDUPTHER

## 2021-08-17 RX ORDER — ACETAMINOPHEN 500 MG
500 TABLET ORAL
COMMUNITY

## 2021-08-17 NOTE — PROGRESS NOTES
[facility] [facility]  WOUND CARE PROGRESS NOTE    Name:  Tomeka Pandey  MR#:  [id number]  :  1940  ACCOUNT #:  [de-identified]  DATE OF SERVICE:  2021    CHIEF COMPLAINT:  Left leg wound. HISTORY OF PRESENT ILLNESS:  The patient returns to the [address] [facility] after an absence of several months with a new left leg wound sustained during a ground-level fall. She scraped her proximal/anterior leg on the ground. Her family has been doing wound care with antibiotic ointment and a dry dressing. She denies fevers or chills. She is walking with a cane, which is normal for her. PAST MEDICAL HISTORY:  Significant for hypertension, dyslipidemia, GERD. ALLERGIES:  ASPIRIN, KEFLEX, AND PENICILLIN. CURRENT MEDICATIONS:  Include Tylenol, famotidine, atorvastatin, lisinopril, furosemide. SOCIAL HISTORY:  Denies tobacco use. FAMILY HISTORY:  Noncontributory. REVIEW OF SYSTEMS:  As per history of present illness. PHYSICAL EXAMINATION:  VITAL SIGNS:  Temperature 97.6, blood pressure 160/74, heart rate 95, respirations 16. GENERAL:  She is awake, alert, and quite pleasant. EXTREMITIES:  Examination of the extremities reveals evidence of chronic venous stasis change in both legs. Her right great toe has a Band-Aid on it from an ingrown toenail. There is ecchymosis across the left anterior leg with a small, superficial degloving wound that measures approximately 2 cm in diameter. There is no evidence of infection. ASSESSMENT:  Superficial wound, left leg. PLAN:  Start treatment with Xeroform and foam border dressing daily. Follow up in one week. It should be essentially healed at that point.         Kayley Delgado MD      [id number]  D:  2021 10:18  T:  2021 13:01  JOB #:  [id number]

## 2021-08-17 NOTE — WOUND CARE
08/17/21 0940   Wound Knee Left #1 08/17/21   Date First Assessed/Time First Assessed: 08/17/21 0939   Present on Hospital Admission: Yes  Wound Approximate Age at First Assessment (Weeks): 1 weeks  Primary Wound Type: Traumatic  Location: Knee  Wound Location Orientation: Left  Wound Description. ..    Wound Image    Wound Etiology Traumatic   Dressing Status Old drainage noted   Cleansed Cleansed with saline   Dressing/Treatment   (Gauze, tape, ace wrap)   Wound Length (cm) 2 cm   Wound Width (cm) 1 cm   Wound Depth (cm) 0.1 cm   Wound Surface Area (cm^2) 2 cm^2   Wound Volume (cm^3) 0.2 cm^3   Wound Assessment Pink/red   Drainage Amount Moderate   Drainage Description Serosanguinous   Wound Odor None   Dena-Wound/Incision Assessment Ecchymosis   Edges Undefined edges   Wound Thickness Description Partial thickness     Visit Vitals  BP (!) 160/74 (BP 1 Location: Left upper arm, BP Patient Position: At rest;Reclining)   Pulse 95   Temp 97.6 °F (36.4 °C)   Resp 16

## 2021-08-17 NOTE — DISCHARGE INSTRUCTIONS
Discharge Instructions/Wound Orders  [address], [address] [place] [address] [id number]  Telephone: [phone]    NAME:  Yessica Kelley OF BIRTH:  1940  MEDICAL RECORD NUMBER:  [id number]  DATE:  8/17/2021  Wound Care Orders:  Left knee wound - cleanse with soap and water, rinse, pat dry, apply xeroform and foam border dressing, change 3 x week. Follow-up in clinic in one week. Dietary:  [x] Diet as tolerated: [] Calorie Diabetic Diet:Low carb and no Sugar [] No Added Salt:[x] Increase Protein: [] Other:Limit the amount of liquid you are drinking and avoid drinking in between meals   Activity:  [x] Activity as tolerated:  [] Patient has no activity restrictions     [] Strict Bedrest: [] Remain off Work:     [] May return to full duty work:                                   [] Return to work with restrictions:             Return Appointment:  [x] Return Appointment: With DR Ana Sparks   in  1 Week(s)  [] Ordered tests:    Electronically signed Nader Mckinnon RN on 8/17/2021 at 9:53 AM     Prudence Granda 281: Should you experience any significant changes in your wound(s) or have questions about your wound care, please contact the [address] [place] [address] at [address] [place] [address] 8:00 am - 4:30. If you need help with your wound outside these hours and cannot wait until we are again available, contact your PCP or go to the hospital emergency room. PLEASE NOTE: IF YOU ARE UNABLE TO OBTAIN WOUND SUPPLIES, CONTINUE TO USE THE SUPPLIES YOU HAVE AVAILABLE UNTIL YOU ARE ABLE TO REACH US. IT IS MOST IMPORTANT TO KEEP THE WOUND COVERED AT ALL TIMES.      Physician Signature:_______________________    Date: ___________ Time:  ____________

## 2021-08-24 ENCOUNTER — HOSPITAL ENCOUNTER (OUTPATIENT)
Dept: WOUND CARE | Age: 81
Discharge: HOME | End: 2021-08-24
Payer: MEDICARE

## 2021-08-24 VITALS
DIASTOLIC BLOOD PRESSURE: 67 MMHG | SYSTOLIC BLOOD PRESSURE: 144 MMHG | HEART RATE: 91 BPM | TEMPERATURE: 98 F | RESPIRATION RATE: 16 BRPM

## 2021-08-24 PROCEDURE — 99213 OFFICE O/P EST LOW 20 MIN: CPT

## 2021-08-24 NOTE — DISCHARGE INSTRUCTIONS
Discharge Instructions/Wound [address]  [place], [address] [facility]  Telephone: [phone]    NAME:  Teresa Vences OF BIRTH:  1940  MEDICAL RECORD NUMBER:  [id number]  DATE:  8/24/2021      WOUND CARE ORDERS:    Clean wound with NS, apply xeroform, border foam every other 2-3 days. Return to see MD in 3 weeks      TREATMENT ORDERS:    Elevate leg(s) above the level of the heart when sitting. Avoid prolonged standing in one place. Do no get dressing/wrap wet. Follow Diet as prescribed:   [x] Diet as tolerated: [] Calorie Diabetic Diet: [] No Added Salt:  [] Increase Protein: [] Other:                 Return Appointment:  [] Return Appointment: With Dr Mcclendon Even in 2 weeks[] Ordered tests:      Electronically signed Goldie Severance, RN on 8/24/2021 at 10:11 AM     [address] [place] [address] Information: Should you experience any significant changes in your wound(s) or have questions about your wound care, please contact the [address] [place] [address] at [address] [place] [address] 8:00 am - 4:30. If you need help with your wound outside these hours and cannot wait until we are again available, contact your PCP or go to the hospital emergency room. PLEASE NOTE: IF YOU ARE UNABLE TO OBTAIN WOUND SUPPLIES, CONTINUE TO USE THE SUPPLIES YOU HAVE AVAILABLE UNTIL YOU ARE ABLE TO REACH US. IT IS MOST IMPORTANT TO KEEP THE WOUND COVERED AT ALL TIMES.      Physician Signature:_______________________    Date: ___________ Time:  ____________

## 2021-08-24 NOTE — PROGRESS NOTES
Plastics/wound care    Proximal left leg wound due to trauma  Looks better, smaller, approx 1.5cm  No infection  Cont xeroform and foam border  Fu 2 weeks.

## 2021-08-24 NOTE — WOUND CARE
08/24/21 1003   Wound Knee Left #1 08/17/21   Date First Assessed/Time First Assessed: 08/17/21 0939   Present on Hospital Admission: Yes  Wound Approximate Age at First Assessment (Weeks): 1 weeks  Primary Wound Type: Traumatic  Location: Knee  Wound Location Orientation: Left  Wound Description. ..    Wound Image    Wound Etiology Traumatic   Dressing Status Old drainage noted   Cleansed Cleansed with saline   Wound Length (cm) 1.5 cm   Wound Width (cm) 1.2 cm   Wound Surface Area (cm^2) 1.8 cm^2   Change in Wound Size % 10   Wound Assessment Pink/red;Epithelialization   Drainage Amount Scant   Drainage Description Serous   Wound Odor None   Wound Thickness Description Partial thickness     Visit Vitals  BP (!) 144/67 (BP 1 Location: Left upper arm)   Pulse 91   Temp 98 °F (36.7 °C)   Resp 16

## 2021-09-14 ENCOUNTER — HOSPITAL ENCOUNTER (OUTPATIENT)
Dept: WOUND CARE | Age: 81
Discharge: HOME | End: 2021-09-14
Payer: MEDICARE

## 2021-09-14 VITALS
HEART RATE: 81 BPM | DIASTOLIC BLOOD PRESSURE: 63 MMHG | SYSTOLIC BLOOD PRESSURE: 131 MMHG | TEMPERATURE: 98.1 F | RESPIRATION RATE: 16 BRPM

## 2021-09-14 PROCEDURE — 99212 OFFICE O/P EST SF 10 MIN: CPT

## 2021-09-14 NOTE — WOUND CARE
09/14/21 1039   Wound Knee Left #1 08/17/21   Date First Assessed/Time First Assessed: 08/17/21 0939   Present on Hospital Admission: Yes  Wound Approximate Age at First Assessment (Weeks): 1 weeks  Primary Wound Type: Traumatic  Location: Knee  Wound Location Orientation: Left  Wound Description. ..    Wound Image    Wound Etiology Traumatic   Dressing Status Old drainage noted   Cleansed Cleansed with saline   Wound Assessment Pink/red   Drainage Amount Scant   Drainage Description Serous   Wound Odor None   Edges Undefined edges   Wound Thickness Description Partial thickness     Visit Vitals  /63 (BP 1 Location: Left upper arm)   Pulse 81   Temp 98.1 °F (36.7 °C)   Resp 16

## 2021-09-14 NOTE — PROGRESS NOTES
Plastics/wound care     Proximal left leg wound due to trauma  Looks better, smaller, approx 1.2cm  No infection  Cont xeroform and foam border  Fu 2 weeks.

## 2021-09-14 NOTE — DISCHARGE INSTRUCTIONS
Discharge Instructions/Wound Orders  [address], [address] [place] [address] [place], [place]  Telephone: [phone]    NAME:  Taran Castrejon OF BIRTH:  1940  MEDICAL RECORD NUMBER:  [id number]  DATE:  9/14/2021     · Wound Care Orders:    · Clean wound with NS, apply xeroform, foam dressing. · Change every other day  · Return to see MD in 3 weeks    Dietary:  [] Diet as tolerated: [] Calorie Diabetic Diet:Low carb and no Sugar [] No Added Salt:[] Increase Protein: [] Other:Limit the amount of liquid you are drinking and avoid drinking in between meals   Activity:  [] Activity as tolerated:  [] Patient has no activity restrictions     [] Strict Bedrest: [] Remain off Work:     [] May return to full duty work:                                   [] Return to work with restrictions:             Return Appointment:   [x] Return Appointment: With Dr Joan Ng in 3 weeks    [] Ordered tests:    Electronically signed Mima Gardner RN on 9/14/2021 at 10:47 AM     Prudence Granda 281: Should you experience any significant changes in your wound(s) or have questions about your wound care, please contact the [address] [place] [address] at [address] [place] [address] 8:00 am - 4:30. If you need help with your wound outside these hours and cannot wait until we are again available, contact your PCP or go to the hospital emergency room. PLEASE NOTE: IF YOU ARE UNABLE TO OBTAIN WOUND SUPPLIES, CONTINUE TO USE THE SUPPLIES YOU HAVE AVAILABLE UNTIL YOU ARE ABLE TO REACH US. IT IS MOST IMPORTANT TO KEEP THE WOUND COVERED AT ALL TIMES.      Physician Signature:_______________________    Date: ___________ Time:  ____________

## 2021-09-23 ENCOUNTER — HOSPITAL ENCOUNTER (OUTPATIENT)
Dept: PREADMISSION TESTING | Age: 81
Discharge: HOME | End: 2021-09-23
Payer: MEDICARE

## 2021-09-23 PROCEDURE — U0005 INFEC AGEN DETEC AMPLI PROBE: HCPCS

## 2021-09-24 LAB
SARS-COV-2, XPLCVT: NOT DETECTED
SOURCE, COVRS: NORMAL

## 2021-09-24 RX ORDER — CLONAZEPAM 0.5 MG/1
0.5 TABLET ORAL AS NEEDED
COMMUNITY
End: 2022-04-06

## 2021-09-27 ENCOUNTER — HOSPITAL ENCOUNTER (OUTPATIENT)
Age: 81
Setting detail: OUTPATIENT SURGERY
Discharge: HOME | End: 2021-09-27
Attending: INTERNAL MEDICINE | Admitting: INTERNAL MEDICINE
Payer: MEDICARE

## 2021-09-27 ENCOUNTER — ANESTHESIA EVENT (OUTPATIENT)
Dept: ENDOSCOPY | Age: 81
End: 2021-09-27
Payer: MEDICARE

## 2021-09-27 ENCOUNTER — ANESTHESIA (OUTPATIENT)
Dept: ENDOSCOPY | Age: 81
End: 2021-09-27
Payer: MEDICARE

## 2021-09-27 VITALS
DIASTOLIC BLOOD PRESSURE: 61 MMHG | SYSTOLIC BLOOD PRESSURE: 138 MMHG | HEART RATE: 83 BPM | BODY MASS INDEX: 20.62 KG/M2 | RESPIRATION RATE: 19 BRPM | TEMPERATURE: 97.1 F | OXYGEN SATURATION: 100 % | WEIGHT: 120.8 LBS | HEIGHT: 64 IN

## 2021-09-27 PROCEDURE — 2709999900 HC NON-CHARGEABLE SUPPLY: Performed by: INTERNAL MEDICINE

## 2021-09-27 PROCEDURE — 74011250636 HC RX REV CODE- 250/636: Performed by: REGISTERED NURSE

## 2021-09-27 PROCEDURE — 74011250636 HC RX REV CODE- 250/636: Performed by: INTERNAL MEDICINE

## 2021-09-27 PROCEDURE — 76040000019: Performed by: INTERNAL MEDICINE

## 2021-09-27 PROCEDURE — 76060000031 HC ANESTHESIA FIRST 0.5 HR: Performed by: INTERNAL MEDICINE

## 2021-09-27 PROCEDURE — 88305 TISSUE EXAM BY PATHOLOGIST: CPT

## 2021-09-27 PROCEDURE — 77030019988 HC FCPS ENDOSC DISP BSC -B: Performed by: INTERNAL MEDICINE

## 2021-09-27 PROCEDURE — 74011000250 HC RX REV CODE- 250: Performed by: REGISTERED NURSE

## 2021-09-27 RX ORDER — MIDAZOLAM HYDROCHLORIDE 1 MG/ML
.25-5 INJECTION, SOLUTION INTRAMUSCULAR; INTRAVENOUS
Status: DISCONTINUED | OUTPATIENT
Start: 2021-09-27 | End: 2021-09-27 | Stop reason: HOSPADM

## 2021-09-27 RX ORDER — ONDANSETRON 2 MG/ML
4 INJECTION INTRAMUSCULAR; INTRAVENOUS AS NEEDED
Status: DISCONTINUED | OUTPATIENT
Start: 2021-09-27 | End: 2021-09-27 | Stop reason: HOSPADM

## 2021-09-27 RX ORDER — SODIUM CHLORIDE, SODIUM LACTATE, POTASSIUM CHLORIDE, CALCIUM CHLORIDE 600; 310; 30; 20 MG/100ML; MG/100ML; MG/100ML; MG/100ML
25 INJECTION, SOLUTION INTRAVENOUS CONTINUOUS
Status: DISCONTINUED | OUTPATIENT
Start: 2021-09-27 | End: 2021-09-27 | Stop reason: HOSPADM

## 2021-09-27 RX ORDER — FENTANYL CITRATE 50 UG/ML
25 INJECTION, SOLUTION INTRAMUSCULAR; INTRAVENOUS
Status: DISCONTINUED | OUTPATIENT
Start: 2021-09-27 | End: 2021-09-27 | Stop reason: HOSPADM

## 2021-09-27 RX ORDER — SODIUM CHLORIDE 9 MG/ML
100 INJECTION, SOLUTION INTRAVENOUS CONTINUOUS
Status: DISCONTINUED | OUTPATIENT
Start: 2021-09-27 | End: 2021-09-27 | Stop reason: HOSPADM

## 2021-09-27 RX ORDER — NALOXONE HYDROCHLORIDE 0.4 MG/ML
0.4 INJECTION, SOLUTION INTRAMUSCULAR; INTRAVENOUS; SUBCUTANEOUS
Status: DISCONTINUED | OUTPATIENT
Start: 2021-09-27 | End: 2021-09-27 | Stop reason: HOSPADM

## 2021-09-27 RX ORDER — PROPOFOL 10 MG/ML
INJECTION, EMULSION INTRAVENOUS AS NEEDED
Status: DISCONTINUED | OUTPATIENT
Start: 2021-09-27 | End: 2021-09-27 | Stop reason: HOSPADM

## 2021-09-27 RX ORDER — HYDROMORPHONE HYDROCHLORIDE 1 MG/ML
.2-.5 INJECTION, SOLUTION INTRAMUSCULAR; INTRAVENOUS; SUBCUTANEOUS
Status: DISCONTINUED | OUTPATIENT
Start: 2021-09-27 | End: 2021-09-27 | Stop reason: HOSPADM

## 2021-09-27 RX ORDER — PHENYLEPHRINE HCL IN 0.9% NACL 0.4MG/10ML
SYRINGE (ML) INTRAVENOUS AS NEEDED
Status: DISCONTINUED | OUTPATIENT
Start: 2021-09-27 | End: 2021-09-27 | Stop reason: HOSPADM

## 2021-09-27 RX ORDER — SUCRALFATE 1 G/1
1 TABLET ORAL 4 TIMES DAILY
Qty: 120 TABLET | Refills: 3 | Status: SHIPPED | OUTPATIENT
Start: 2021-09-27 | End: 2021-10-27

## 2021-09-27 RX ORDER — LIDOCAINE HYDROCHLORIDE 20 MG/ML
INJECTION, SOLUTION EPIDURAL; INFILTRATION; INTRACAUDAL; PERINEURAL AS NEEDED
Status: DISCONTINUED | OUTPATIENT
Start: 2021-09-27 | End: 2021-09-27 | Stop reason: HOSPADM

## 2021-09-27 RX ORDER — SODIUM CHLORIDE 0.9 % (FLUSH) 0.9 %
5-40 SYRINGE (ML) INJECTION EVERY 8 HOURS
Status: DISCONTINUED | OUTPATIENT
Start: 2021-09-27 | End: 2021-09-27 | Stop reason: HOSPADM

## 2021-09-27 RX ORDER — ATROPINE SULFATE 0.1 MG/ML
0.5 INJECTION INTRAVENOUS
Status: DISCONTINUED | OUTPATIENT
Start: 2021-09-27 | End: 2021-09-27 | Stop reason: HOSPADM

## 2021-09-27 RX ORDER — GLYCOPYRROLATE 0.2 MG/ML
INJECTION INTRAMUSCULAR; INTRAVENOUS AS NEEDED
Status: DISCONTINUED | OUTPATIENT
Start: 2021-09-27 | End: 2021-09-27 | Stop reason: HOSPADM

## 2021-09-27 RX ORDER — EPINEPHRINE 0.1 MG/ML
1 INJECTION INTRACARDIAC; INTRAVENOUS
Status: DISCONTINUED | OUTPATIENT
Start: 2021-09-27 | End: 2021-09-27 | Stop reason: HOSPADM

## 2021-09-27 RX ORDER — SODIUM CHLORIDE 0.9 % (FLUSH) 0.9 %
5-40 SYRINGE (ML) INJECTION AS NEEDED
Status: DISCONTINUED | OUTPATIENT
Start: 2021-09-27 | End: 2021-09-27 | Stop reason: HOSPADM

## 2021-09-27 RX ORDER — FLUMAZENIL 0.1 MG/ML
0.2 INJECTION INTRAVENOUS
Status: DISCONTINUED | OUTPATIENT
Start: 2021-09-27 | End: 2021-09-27 | Stop reason: HOSPADM

## 2021-09-27 RX ORDER — FENTANYL CITRATE 50 UG/ML
50 INJECTION, SOLUTION INTRAMUSCULAR; INTRAVENOUS AS NEEDED
Status: DISCONTINUED | OUTPATIENT
Start: 2021-09-27 | End: 2021-09-27 | Stop reason: HOSPADM

## 2021-09-27 RX ORDER — LIDOCAINE HYDROCHLORIDE 10 MG/ML
0.1 INJECTION, SOLUTION EPIDURAL; INFILTRATION; INTRACAUDAL; PERINEURAL AS NEEDED
Status: DISCONTINUED | OUTPATIENT
Start: 2021-09-27 | End: 2021-09-27 | Stop reason: HOSPADM

## 2021-09-27 RX ORDER — SODIUM CHLORIDE 9 MG/ML
50 INJECTION, SOLUTION INTRAVENOUS CONTINUOUS
Status: DISCONTINUED | OUTPATIENT
Start: 2021-09-27 | End: 2021-09-27 | Stop reason: HOSPADM

## 2021-09-27 RX ORDER — DEXTROMETHORPHAN/PSEUDOEPHED 2.5-7.5/.8
1.2 DROPS ORAL
Status: DISCONTINUED | OUTPATIENT
Start: 2021-09-27 | End: 2021-09-27 | Stop reason: HOSPADM

## 2021-09-27 RX ADMIN — PROPOFOL 80 MG: 10 INJECTION, EMULSION INTRAVENOUS at 14:31

## 2021-09-27 RX ADMIN — SODIUM CHLORIDE 50 ML/HR: 9 INJECTION, SOLUTION INTRAVENOUS at 14:13

## 2021-09-27 RX ADMIN — GLYCOPYRROLATE 0.2 MG: 0.2 INJECTION, SOLUTION INTRAMUSCULAR; INTRAVENOUS at 14:30

## 2021-09-27 RX ADMIN — PROPOFOL 20 MG: 10 INJECTION, EMULSION INTRAVENOUS at 14:37

## 2021-09-27 RX ADMIN — LIDOCAINE HYDROCHLORIDE 60 MG: 20 INJECTION, SOLUTION EPIDURAL; INFILTRATION; INTRACAUDAL; PERINEURAL at 14:31

## 2021-09-27 RX ADMIN — PROPOFOL 30 MG: 10 INJECTION, EMULSION INTRAVENOUS at 14:34

## 2021-09-27 RX ADMIN — Medication 80 MCG: at 14:44

## 2021-09-27 NOTE — ANESTHESIA POSTPROCEDURE EVALUATION
Procedure(s):  ESOPHAGOGASTRODUODENOSCOPY (EGD)  ESOPHAGOGASTRODUODENAL (EGD) BIOPSY. general, total IV anesthesia    Anesthesia Post Evaluation      Multimodal analgesia: multimodal analgesia used between 6 hours prior to anesthesia start to PACU discharge  Patient location during evaluation: PACU  Level of consciousness: sleepy but conscious  Pain score: 0  Pain management: adequate  Airway patency: patent  Anesthetic complications: no  Cardiovascular status: acceptable  Respiratory status: acceptable  Hydration status: acceptable  Comments: +Post-Anesthesia Evaluation and Assessment    Patient: Orlando Escamilla MRN: [id number]  SSN: [id number]   YOB: 1940  Age: 79 y.o. Sex: female      Cardiovascular Function/Vital Signs    /61   Pulse 83   Temp 36.2 °C (97.1 °F)   Resp 19   Ht 5' 4\" (1.626 m)   Wt 54.8 kg (120 lb 12.8 oz)   SpO2 100%   Breastfeeding No   BMI 20.74 kg/m²     Patient is status post Procedure(s):  ESOPHAGOGASTRODUODENOSCOPY (EGD)  ESOPHAGOGASTRODUODENAL (EGD) BIOPSY. Nausea/Vomiting: Controlled. Postoperative hydration reviewed and adequate. Pain:  Pain Scale 1: Numeric (0 - 10) (09/27/21 1451)  Pain Intensity 1: 0 (09/27/21 1451)   Managed. Neurological Status: At baseline. Mental Status and Level of Consciousness: Arousable. Pulmonary Status:   O2 Device: None (Room air) (09/27/21 1451)   Adequate oxygenation and airway patent. Complications related to anesthesia: None    Post-anesthesia assessment completed. No concerns.     Signed By: Savanna Gottlieb MD    9/27/2021  Post anesthesia nausea and vomiting:  controlled  Final Post Anesthesia Temperature Assessment:  Normothermia (36.0-37.5 degrees C)      INITIAL Post-op Vital signs:   Vitals Value Taken Time   /58 09/27/21 1509   Temp 36.2 °C (97.1 °F) 09/27/21 1451   Pulse 82 09/27/21 1509   Resp 13 09/27/21 1509   SpO2 99 % 09/27/21 1509   Vitals shown include unvalidated device data.

## 2021-09-27 NOTE — PROGRESS NOTES
Elmo Conway Chris  1940  [id number]    Situation:  Verbal report received from: Patricia Cheng RN  Procedure: Procedure(s):  ESOPHAGOGASTRODUODENOSCOPY (EGD)  ESOPHAGOGASTRODUODENAL (EGD) BIOPSY    Background:    Preoperative diagnosis: GERD  Postoperative diagnosis: gastric ulcer, esophagitis    :  Dr. Deuce Ashford  Assistant(s): Endoscopy Technician-1: Naren Talavera  Endoscopy RN-1: Michelle Rocha RN    Specimens:   ID Type Source Tests Collected by Time Destination   1 : gastric/ antrim ulcer bx Preservative   Minerva Hernandez MD 9/27/2021 1415 Pathology   2 : GE Junction bx Preservative [facility] Carlos Phillips MD 9/27/2021 1436 Pathology     H. Pylori  no    Assessment:  Intra-procedure medications     Anesthesia gave intra-procedure sedation and medications, see anesthesia flow sheet yes    Intravenous fluids: NS@ KVO     Vital signs stable     Abdominal assessment: round and soft     Recommendation:  Discharge patient per MD order.   Family Son Mary Calderon to share finding with family or friend yes

## 2021-09-27 NOTE — PROGRESS NOTES
Endoscope was pre-cleaned at bedside immediately following procedure by Marissa Shafer. See Anesthesia report. Received report from anesthesia staff on vital signs and status of patient.

## 2021-09-27 NOTE — PROCEDURES
[facility]                   Endoscopic Gastroduodenoscopy Procedure Note      9/27/2021  Jennie Donaldson  1940  [id number]    Procedure: Endoscopic Gastroduodenoscopy with biopsy    Indication:  Abdominal pain, epigastric, GERD     Pre-operative Diagnosis: see indication above    Post-operative Diagnosis: see findings below    : JUVENAL Carlson MD    Referring Provider:  Tony Tam MD      Anesthesia/Sedation:  MAC anesthesia    Airway assessment: No airway problems anticipated    Pre-Procedural Exam:      Airway: clear, no airway problems anticipated  Heart: RRR, without gallops or rubs  Lungs: clear bilaterally without wheezes, crackles, or rhonchi  Abdomen: soft, nontender, nondistended, bowel sounds present  Mental Status: awake, alert and oriented to person, place and time       Procedure Details     After infomed consent was obtained for the procedure, with all risks and benefits of procedure explained the patient was taken to the endoscopy suite and placed in the left lateral decubitus position. Following sequential administration of sedation as per above, the endoscope was inserted into the mouth and advanced under direct vision to second portion of the duodenum. A careful inspection was made as the gastroscope was withdrawn, including a retroflexed view of the proximal stomach; findings and interventions are described below. Findings:   Esophagus:  Focal esophagitis at the GE junction with small erosions. Biopsied    Stomach: Fundus and body normal. In the antrum along the posterior wall is a 10 mm deep ulcer. Margins biopsied  Duodenum: normal    Therapies:  biopsy of esophagus  biopsy of stomach antrum    Specimens: 1. Biopsies of gastric antrum  2. Biopsies of GE junction           Complications:   None; patient tolerated the procedure well. EBL:  None. Impression:      1. Erosive esophagitis  2.  Acute gastric ulcer in the antrum  3. Normal duodenum    Recommendations:  -Continue acid suppression with the famotidine. , -Await pathology. , -No NSAIDS, -Will start sucralfate 1 gram qid since she wants to avoid PPI    Karla Nicholas MD9/27/2021

## 2021-09-27 NOTE — DISCHARGE INSTRUCTIONS
Lavonne Hearn MD  [facility], [address]  [place], [address] [facility]  [phone]  [email or web address]    Kathy Taylor  [id number]  1940    EGD DISCHARGE INSTRUCTIONS    Discomfort:  Sore throat- throat lozenges or warm salt water gargle  redness at IV site- apply warm compress to area; if redness or soreness persist- contact your physician  Gaseous discomfort- walking, belching will help relieve any discomfort  You may not operate a vehicle for 12 hours  You may not engage in an occupation involving machinery or appliances for rest of today  You may not drink alcoholic beverages for at least 12 hours  Avoid making any critical decisions for at least 24 hour  DIET  You may have anything by mouth. You may eat and drink immediately. You may resume your regular diet - however -  remember your colon is empty and a heavy meal will produce gas. Avoid these foods:  vegetables, fried / greasy foods, carbonated drinks      ACTIVITY  You may resume your normal daily activities   Spend the remainder of the day resting -  avoid any strenuous activity. CALL M.D. ANY SIGN OF   Increasing pain, nausea, vomiting  Abdominal distension (swelling)  New increased bleeding (oral or rectal)  Fever (chills)  Pain in chest area  Bloody discharge from nose or mouth  Shortness of breath    Follow-up Instructions:   Call Dr. Lavonne Hearn for any questions or problems. Telephone # [phone]  Dr. Godwin Links office will notify you of the biopsy results available  Within 7 to 10 days. We will call you or send a letter   Continue the famotidine 40 mg twice a day   Start sucralfate 1 gram tablet four times a day    Do not take any kind of anti-inflammatories such as ibuprofen or naproxen. (Aleve)    ENDOSCOPY FINDINGS:   Your endoscopy showed an ulcer in your stomach and erosive esophagitis.       DISCHARGE SUMMARY from Nurse    The following personal items collected during your admission are returned to you:   Dental Appliance: Dental Appliances: None  Vision: Visual Aid: Glasses  Hearing Aid:    Jewelry:    Clothing:    Other Valuables:    Valuables sent to safe:

## 2021-09-27 NOTE — H&P
Melinda Guerra MD  [facility], [address]  [phone]  [email or web address]      Gastroenterology Outpatient History and Physical    Patient: Josué Lozano    Physician: Cristina Funes MD    Vital Signs: Blood pressure (!) 153/82, pulse 80, temperature 97.4 °F (36.3 °C), resp. rate 18, height 5' 4\" (1.626 m), weight 54.8 kg (120 lb 12.8 oz), SpO2 97 %, not currently breastfeeding. Allergies: Allergies   Allergen Reactions    Aspirin Other (comments)     ulcers    Cephalexin Hives    Penicillins Anaphylaxis       Chief Complaint: reflux    History of Present Illness: Here for EGD to further evaluate chronic gerd. Currently taking famotidine    History:  Past Medical History:   Diagnosis Date    Chronic pain     GERD (gastroesophageal reflux disease)     Hypercholesteremia     Hypertension     Ill-defined condition     DJD    Ill-defined condition     Diverticulosis      Past Surgical History:   Procedure Laterality Date    HX DILATION AND CURETTAGE      HX HEENT  2001    Oral surgery - gum disease    HX ORTHOPAEDIC Right 2006    Trigger finger repair      Social History     Socioeconomic History    Marital status:      Spouse name: Not on file    Number of children: Not on file    Years of education: Not on file    Highest education level: Not on file   Tobacco Use    Smoking status: Never Smoker    Smokeless tobacco: Never Used   Vaping Use    Vaping Use: Never used   Substance and Sexual Activity    Alcohol use: No    Drug use: No   Other Topics Concern     Social Determinants of Health     Financial Resource Strain:     Difficulty of Paying Living Expenses:    Food Insecurity:     Worried About Running Out of Food in the Last Year:     Ran Out of Food in the Last Year:    Transportation Needs:     Lack of Transportation (Medical):      Lack of Transportation (Non-Medical):    Physical Activity:     Days of Exercise per Week:     Minutes of Exercise per Session:    Stress:     Feeling of Stress :    Social Connections:     Frequency of Communication with Friends and Family:     Frequency of Social Gatherings with Friends and Family:     Attends Christianity Services:     Active Member of Clubs or Organizations:     Attends Club or Organization Meetings:     Marital Status:       Family History   Problem Relation Age of Onset    Stroke Mother     Heart Attack Father         age [de-identified]      Patient Active Problem List   Diagnosis Code    Precordial pain R07.2    Mixed hyperlipidemia E78.2    Essential hypertension I10         Medications:   Prior to Admission medications    Medication Sig Start Date End Date Taking? Authorizing Provider   clonazePAM (KlonoPIN) 0.5 mg tablet Take 0.5 mg by mouth as needed for Anxiety. Yes Provider, Historical   famotidine (PEPCID) 40 mg tablet Take 40 mg by mouth two (2) times a day. Yes Provider, Historical   atorvastatin (LIPITOR) 20 mg tablet Take 20 mg by mouth daily. Yes Provider, Historical   lisinopril (PRINIVIL, ZESTRIL) 10 mg tablet Take 10 mg by mouth daily. Yes Provider, Historical   multivitamin with iron tablet Take 1 Tab by mouth daily. Yes Provider, Historical   acetaminophen (TYLENOL) 500 mg tablet Take 500 mg by mouth every six (6) hours as needed for Pain. Provider, Historical   acetaminophen/diphenhydramine (TYLENOL PM EXTRA STRENGTH PO) Take 1 Tablet by mouth nightly as needed. Provider, Historical   furosemide (LASIX) 40 mg tablet Take  by mouth daily as needed.     Provider, Historical       Physical Exam:     General: well developed, well nourished   HEENT: unremarkable   Heart: regular rhythm no mumur    Lungs: clear   Abdominal:  benign   Neurological: unremarkable   Extremities: no edema     Findings/Diagnosis: GERD    Plan of Care/Planned Procedure: EGD with  monitored anesthesia care sedation       Signed:  Jt Catalan MD 9/27/2021

## 2021-09-27 NOTE — ANESTHESIA PREPROCEDURE EVALUATION
Relevant Problems   No relevant active problems       Anesthetic History   No history of anesthetic complications            Review of Systems / Medical History  Patient summary reviewed and pertinent labs reviewed    Pulmonary  Within defined limits                 Neuro/Psych   Within defined limits           Cardiovascular    Hypertension          Hyperlipidemia    Exercise tolerance: >4 METS     GI/Hepatic/Renal     GERD           Endo/Other             Other Findings   Comments: Hx of chronic pain                Anesthetic Plan    ASA: 2  Anesthesia type: general and total IV anesthesia          Induction: Intravenous  Anesthetic plan and risks discussed with: Patient

## 2021-09-27 NOTE — PROGRESS NOTES
The risks and benefits of the bite block have been explained to patient. Patient verbalizes understanding. Pt has a chipped front tooth prior to procedure.

## 2021-10-05 ENCOUNTER — HOSPITAL ENCOUNTER (OUTPATIENT)
Dept: WOUND CARE | Age: 81
Discharge: HOME | End: 2021-10-05
Payer: MEDICARE

## 2021-10-05 VITALS — TEMPERATURE: 97.9 F | HEART RATE: 87 BPM | SYSTOLIC BLOOD PRESSURE: 138 MMHG | DIASTOLIC BLOOD PRESSURE: 70 MMHG

## 2021-10-05 PROCEDURE — 99211 OFF/OP EST MAY X REQ PHY/QHP: CPT

## 2021-10-05 NOTE — DISCHARGE INSTRUCTIONS
Discharge Instructions/Wound [facility]  [address] [facility] [place] [address] [facility]  Telephone: [phone]    NAME:  Juni Rosen OF BIRTH:  1940  MEDICAL RECORD NUMBER:  [id number]  DATE:  10/5/2021      WOUND CARE ORDERS:    Wounds have healed, no further follow up needed         Electronically signed Nu Teran RN on 10/5/2021 at 9:27 AM     [address]: Should you experience any significant changes in your wound(s) or have questions about your wound care, please contact the [address] [place] [address] at [address] [place] [address] 8:00 am - 4:30. If you need help with your wound outside these hours and cannot wait until we are again available, contact your PCP or go to the hospital emergency room. PLEASE NOTE: IF YOU ARE UNABLE TO OBTAIN WOUND SUPPLIES, CONTINUE TO USE THE SUPPLIES YOU HAVE AVAILABLE UNTIL YOU ARE ABLE TO REACH US. IT IS MOST IMPORTANT TO KEEP THE WOUND COVERED AT ALL TIMES.      Physician Signature:_______________________    Date: ___________ Time:  ____________

## 2021-10-05 NOTE — PROGRESS NOTES
Plastics/wound care    Fu left leg wounds  Completely healed  No open wound or infection  Rec compression stockings  Fu prn.

## 2021-10-05 NOTE — WOUND CARE
10/05/21 0919   Wound Knee Left #1 08/17/21   Date First Assessed/Time First Assessed: 08/17/21 0939   Present on Hospital Admission: Yes  Wound Approximate Age at First Assessment (Weeks): 1 weeks  Primary Wound Type: Traumatic  Location: Knee  Wound Location Orientation: Left  Wound Description. ..    Wound Etiology Traumatic   Wound Length (cm) 1 cm   Wound Width (cm) 0.2 cm   Wound Depth (cm) 0.1 cm   Wound Surface Area (cm^2) 0.2 cm^2   Change in Wound Size % 90   Wound Volume (cm^3) 0.02 cm^3   Wound Healing % 90   Wound Assessment   (scab)   Drainage Amount None   Wound Odor None

## 2022-01-25 ENCOUNTER — HOSPITAL ENCOUNTER (EMERGENCY)
Age: 82
Discharge: HOME | End: 2022-01-25
Attending: EMERGENCY MEDICINE
Payer: MEDICARE

## 2022-01-25 ENCOUNTER — APPOINTMENT (OUTPATIENT)
Dept: CT IMAGING | Age: 82
End: 2022-01-25
Attending: EMERGENCY MEDICINE
Payer: MEDICARE

## 2022-01-25 VITALS
SYSTOLIC BLOOD PRESSURE: 158 MMHG | HEIGHT: 64 IN | OXYGEN SATURATION: 98 % | DIASTOLIC BLOOD PRESSURE: 77 MMHG | WEIGHT: 121.03 LBS | BODY MASS INDEX: 20.66 KG/M2 | TEMPERATURE: 98.5 F | HEART RATE: 97 BPM | RESPIRATION RATE: 18 BRPM

## 2022-01-25 DIAGNOSIS — F22 PARANOIA (HCC): Primary | ICD-10-CM

## 2022-01-25 LAB
ALBUMIN SERPL-MCNC: 3.3 G/DL (ref 3.5–5)
ALBUMIN/GLOB SERPL: 0.9 {RATIO} (ref 1.1–2.2)
ALP SERPL-CCNC: 184 U/L (ref 45–117)
ALT SERPL-CCNC: 23 U/L (ref 12–78)
AMMONIA PLAS-SCNC: 15 UMOL/L
AMPHET UR QL SCN: NEGATIVE
ANION GAP SERPL CALC-SCNC: 3 MMOL/L (ref 5–15)
APAP SERPL-MCNC: <2 UG/ML (ref 10–30)
APPEARANCE UR: CLEAR
AST SERPL-CCNC: 20 U/L (ref 15–37)
BACTERIA URNS QL MICRO: NEGATIVE /HPF
BARBITURATES UR QL SCN: NEGATIVE
BASOPHILS # BLD: 0.1 K/UL (ref 0–0.1)
BASOPHILS NFR BLD: 1 % (ref 0–1)
BENZODIAZ UR QL: NEGATIVE
BILIRUB SERPL-MCNC: 0.5 MG/DL (ref 0.2–1)
BILIRUB UR QL: NEGATIVE
BUN SERPL-MCNC: 23 MG/DL (ref 6–20)
BUN/CREAT SERPL: 20 (ref 12–20)
CALCIUM SERPL-MCNC: 9.7 MG/DL (ref 8.5–10.1)
CANNABINOIDS UR QL SCN: NEGATIVE
CHLORIDE SERPL-SCNC: 105 MMOL/L (ref 97–108)
CO2 SERPL-SCNC: 30 MMOL/L (ref 21–32)
COCAINE UR QL SCN: NEGATIVE
COLOR UR: ABNORMAL
CREAT SERPL-MCNC: 1.13 MG/DL (ref 0.55–1.02)
DIFFERENTIAL METHOD BLD: ABNORMAL
DRUG SCRN COMMENT,DRGCM: NORMAL
EOSINOPHIL # BLD: 0.1 K/UL (ref 0–0.4)
EOSINOPHIL NFR BLD: 2 % (ref 0–7)
EPITH CASTS URNS QL MICRO: ABNORMAL /LPF
ERYTHROCYTE [DISTWIDTH] IN BLOOD BY AUTOMATED COUNT: 12.7 % (ref 11.5–14.5)
ETHANOL SERPL-MCNC: <10 MG/DL
GLOBULIN SER CALC-MCNC: 3.6 G/DL (ref 2–4)
GLUCOSE SERPL-MCNC: 114 MG/DL (ref 65–100)
GLUCOSE UR STRIP.AUTO-MCNC: NEGATIVE MG/DL
HCT VFR BLD AUTO: 36.2 % (ref 35–47)
HGB BLD-MCNC: 11.5 G/DL (ref 11.5–16)
HGB UR QL STRIP: ABNORMAL
HYALINE CASTS URNS QL MICRO: ABNORMAL /LPF (ref 0–5)
IMM GRANULOCYTES # BLD AUTO: 0 K/UL (ref 0–0.04)
IMM GRANULOCYTES NFR BLD AUTO: 0 % (ref 0–0.5)
KETONES UR QL STRIP.AUTO: NEGATIVE MG/DL
LEUKOCYTE ESTERASE UR QL STRIP.AUTO: ABNORMAL
LYMPHOCYTES # BLD: 1.9 K/UL (ref 0.8–3.5)
LYMPHOCYTES NFR BLD: 31 % (ref 12–49)
MCH RBC QN AUTO: 30.6 PG (ref 26–34)
MCHC RBC AUTO-ENTMCNC: 31.8 G/DL (ref 30–36.5)
MCV RBC AUTO: 96.3 FL (ref 80–99)
METHADONE UR QL: NEGATIVE
MONOCYTES # BLD: 0.6 K/UL (ref 0–1)
MONOCYTES NFR BLD: 9 % (ref 5–13)
NEUTS SEG # BLD: 3.5 K/UL (ref 1.8–8)
NEUTS SEG NFR BLD: 57 % (ref 32–75)
NITRITE UR QL STRIP.AUTO: NEGATIVE
NRBC # BLD: 0 K/UL (ref 0–0.01)
NRBC BLD-RTO: 0 PER 100 WBC
OPIATES UR QL: NEGATIVE
PCP UR QL: NEGATIVE
PH UR STRIP: 6.5 [PH] (ref 5–8)
PLATELET # BLD AUTO: 306 K/UL (ref 150–400)
PMV BLD AUTO: 9.3 FL (ref 8.9–12.9)
POTASSIUM SERPL-SCNC: 4.6 MMOL/L (ref 3.5–5.1)
PROT SERPL-MCNC: 6.9 G/DL (ref 6.4–8.2)
PROT UR STRIP-MCNC: NEGATIVE MG/DL
RBC # BLD AUTO: 3.76 M/UL (ref 3.8–5.2)
RBC #/AREA URNS HPF: ABNORMAL /HPF (ref 0–5)
SALICYLATES SERPL-MCNC: <1.7 MG/DL (ref 2.8–20)
SODIUM SERPL-SCNC: 138 MMOL/L (ref 136–145)
SP GR UR REFRACTOMETRY: 1.02 (ref 1–1.03)
UA: UC IF INDICATED,UAUC: ABNORMAL
UROBILINOGEN UR QL STRIP.AUTO: 1 EU/DL (ref 0.2–1)
WBC # BLD AUTO: 6.2 K/UL (ref 3.6–11)
WBC URNS QL MICRO: ABNORMAL /HPF (ref 0–4)

## 2022-01-25 PROCEDURE — 99284 EMERGENCY DEPT VISIT MOD MDM: CPT

## 2022-01-25 PROCEDURE — 70450 CT HEAD/BRAIN W/O DYE: CPT

## 2022-01-25 PROCEDURE — 81001 URINALYSIS AUTO W/SCOPE: CPT

## 2022-01-25 PROCEDURE — 85025 COMPLETE CBC W/AUTO DIFF WBC: CPT

## 2022-01-25 PROCEDURE — 80053 COMPREHEN METABOLIC PANEL: CPT

## 2022-01-25 PROCEDURE — 80143 DRUG ASSAY ACETAMINOPHEN: CPT

## 2022-01-25 PROCEDURE — 80307 DRUG TEST PRSMV CHEM ANLYZR: CPT

## 2022-01-25 PROCEDURE — 82077 ASSAY SPEC XCP UR&BREATH IA: CPT

## 2022-01-25 PROCEDURE — 93005 ELECTROCARDIOGRAM TRACING: CPT

## 2022-01-25 PROCEDURE — 82140 ASSAY OF AMMONIA: CPT

## 2022-01-25 PROCEDURE — 36415 COLL VENOUS BLD VENIPUNCTURE: CPT

## 2022-01-25 PROCEDURE — 87086 URINE CULTURE/COLONY COUNT: CPT

## 2022-01-25 PROCEDURE — 80179 DRUG ASSAY SALICYLATE: CPT

## 2022-01-25 RX ORDER — HYDROXYZINE 50 MG/1
50 TABLET, FILM COATED ORAL
Qty: 20 TABLET | Refills: 0 | Status: SHIPPED | OUTPATIENT
Start: 2022-01-25 | End: 2022-02-04

## 2022-01-26 LAB
ATRIAL RATE: 82 BPM
CALCULATED P AXIS, ECG09: 54 DEGREES
CALCULATED R AXIS, ECG10: 1 DEGREES
CALCULATED T AXIS, ECG11: 60 DEGREES
DIAGNOSIS, 93000: NORMAL
P-R INTERVAL, ECG05: 148 MS
Q-T INTERVAL, ECG07: 384 MS
QRS DURATION, ECG06: 92 MS
QTC CALCULATION (BEZET), ECG08: 448 MS
VENTRICULAR RATE, ECG03: 82 BPM
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
## 2022-01-26 NOTE — DISCHARGE INSTRUCTIONS
It was a pleasure taking care of you in our Emergency Department today. We know that when you come to [facility], you are entrusting us with your health, comfort, and safety. Our physicians and nurses honor that trust, and truly appreciate the opportunity to care for you and your loved ones. We also value your feedback. If you receive a survey about your Emergency Department experience today, please fill it out. We care about our patients' feedback, and we listen to what you have to say. Thank you!       Dr. Carol Langford MD.

## 2022-01-26 NOTE — ED NOTES
Discharge instructions given to patient by Maliha Guerrero RN. Verbalized understanding of instructions. Patient discharged without difficulty. Patient discharged in stable condition via wheelchair accompanied by staff.

## 2022-01-26 NOTE — ED PROVIDER NOTES
EMERGENCY DEPARTMENT HISTORY AND PHYSICAL EXAM     ------------------------------------------------------------------------------------------------------  Please note that this dictation was completed with [employer], the [employer] voice recognition software. Quite often unanticipated grammatical, syntax, homophones, and other interpretive errors are inadvertently transcribed by the computer software. Please disregard these errors. Please excuse any errors that have escaped final proofreading.  -----------------------------------------------------------------------------------------------------------------    Date: 1/25/2022  Patient Name: Dara Portillo    History of Presenting Illness     Chief Complaint   Patient presents with    Mental Health Problem     pt presents w/ son. pt son reports pt has been cleared by PCP and has no UTI. pt son reports over the last few months she has been calling the police because she doesnt feel safe at home and is hallucinating, pt is unable to live alone at this point. son reports they have been unable to get in w/ nuerology        History Provided By: Patient and family    HPI: Dara Portillo is a 79 y.o. female, with significant pmhx of hypertension, cholesterol, reflux, degenerative joint disease with chronic back pain, who presents via private vehicle to the ED with family reporting they have concerns that she is having auditory and visual hallucinations. For several weeks patient has been reporting concerns over her adult male neighbor ambulating around on her property in the middle of the night. Patient's family members have stayed with her overnight and have not witnessed any of the sounds or visually concerning issues that the patient has complained about. Patient notes that she believes the neighbor is underneath her house at times and is messing with her TV.   Also expresses concerns that he is gone into her [employer] and [place] [employer] things although she cannot specifically state whether something is missing nor does she note if things seem to be out of place. Family reports that they have seen primary care with evaluation for urinary tract infection having been ruled out multiple times but cannot get into neurology offices as an outpatient until the spring. Patient is called the police 3 times in the last week due to concerns of someone having been underneath her house or on her property without findings upon their arrival.  Patient notes that she has never had a negative interaction with this particular neighbor. This that she has spoken with him in the past while she was outside gardening. Reports that he is living on a trailer that is along the shared property line. Pt specifically denies any recent fevers, chills, CP, SOB, nausea, vomiting, diarrhea, abd pain, changes in BM, urinary sxs, or headache. PCP: Dion Morales MD    Social Hx: denies tobacco, denies EtOH, denies recreational/ Illicit Drugs     There are no other complaints, changes, or physical findings at this time. Allergies   Allergen Reactions    Aspirin Other (comments)     ulcers    Cephalexin Hives    Penicillins Anaphylaxis         Current Outpatient Medications   Medication Sig Dispense Refill    hydrOXYzine HCL (ATARAX) 50 mg tablet Take 1 Tablet by mouth every six (6) hours as needed for Anxiety or Sleep for up to 10 days. 20 Tablet 0    clonazePAM (KlonoPIN) 0.5 mg tablet Take 0.5 mg by mouth as needed for Anxiety.  acetaminophen (TYLENOL) 500 mg tablet Take 500 mg by mouth every six (6) hours as needed for Pain.  famotidine (PEPCID) 40 mg tablet Take 40 mg by mouth two (2) times a day.  acetaminophen/diphenhydramine (TYLENOL PM EXTRA STRENGTH PO) Take 1 Tablet by mouth nightly as needed.  atorvastatin (LIPITOR) 20 mg tablet Take 20 mg by mouth daily.  lisinopril (PRINIVIL, ZESTRIL) 10 mg tablet Take 10 mg by mouth daily.       furosemide (LASIX) 40 mg tablet Take by mouth daily as needed.  multivitamin with iron tablet Take 1 Tab by mouth daily. Past History     Past Medical History:  Past Medical History:   Diagnosis Date    Chronic pain     GERD (gastroesophageal reflux disease)     Hypercholesteremia     Hypertension     Ill-defined condition     DJD    Ill-defined condition     Diverticulosis       Past Surgical History:  Past Surgical History:   Procedure Laterality Date    HX DILATION AND CURETTAGE      HX HEENT  2001    Oral surgery - gum disease    HX ORTHOPAEDIC Right 2006    Trigger finger repair    UPPER GI ENDOSCOPY,BIOPSY  9/27/2021            Family History:  Family History   Problem Relation Age of Onset    Stroke Mother     Heart Attack Father         age [de-identified]       Social History:  Social History     Tobacco Use    Smoking status: Never Smoker    Smokeless tobacco: Never Used   Vaping Use    Vaping Use: Never used   Substance Use Topics    Alcohol use: No    Drug use: No       Allergies: Allergies   Allergen Reactions    Aspirin Other (comments)     ulcers    Cephalexin Hives    Penicillins Anaphylaxis         Review of Systems   Review of Systems   Constitutional: Negative. Negative for fever. Eyes: Negative. Respiratory: Negative. Negative for shortness of breath. Cardiovascular: Negative for chest pain. Gastrointestinal: Negative for abdominal pain, nausea and vomiting. Endocrine: Negative. Genitourinary: Negative. Negative for difficulty urinating, dysuria and hematuria. Musculoskeletal: Negative. Skin: Negative. Neurological: Negative. Psychiatric/Behavioral: Positive for hallucinations and sleep disturbance. Negative for suicidal ideas. The patient is nervous/anxious. All other systems reviewed and are negative. Physical Exam   Physical Exam  Vitals and nursing note reviewed. HENT:      Head: Normocephalic and atraumatic. Nose: No nasal deformity.       Mouth/Throat:      Lips: No lesions. Eyes:      Conjunctiva/sclera: Conjunctivae normal.   Cardiovascular:      Rate and Rhythm: Normal rate. Pulmonary:      Effort: Pulmonary effort is normal.   Musculoskeletal:         General: Normal range of motion. Cervical back: Normal range of motion. No pain with movement. Right lower leg: No edema. Left lower leg: No edema. Skin:     General: Skin is dry. Findings: No rash. Neurological:      General: No focal deficit present. Mental Status: She is alert. Cranial Nerves: No cranial nerve deficit. Sensory: No sensory deficit. Motor: No weakness. Coordination: Coordination normal.   Psychiatric:         Attention and Perception: Attention normal.         Mood and Affect: Mood normal.         Thought Content: Thought content is paranoid. Diagnostic Study Results     Labs -     Recent Results (from the past 12 hour(s))   ACETAMINOPHEN    Collection Time: 01/25/22  9:37 PM   Result Value Ref Range    Acetaminophen level <2 (L) 10 - 30 ug/mL   AMMONIA    Collection Time: 01/25/22  9:37 PM   Result Value Ref Range    Ammonia 15 <32 UMOL/L   ETHYL ALCOHOL    Collection Time: 01/25/22  9:37 PM   Result Value Ref Range    ALCOHOL(ETHYL),SERUM <10 <10 MG/DL   CBC WITH AUTOMATED DIFF    Collection Time: 01/25/22  9:37 PM   Result Value Ref Range    WBC 6.2 3.6 - 11.0 K/uL    RBC 3.76 (L) 3.80 - 5.20 M/uL    HGB 11.5 11.5 - 16.0 g/dL    HCT 36.2 35.0 - 47.0 %    MCV 96.3 80.0 - 99.0 FL    MCH 30.6 26.0 - 34.0 PG    MCHC 31.8 30.0 - 36.5 g/dL    RDW 12.7 11.5 - 14.5 %    PLATELET 718 727 - 652 K/uL    MPV 9.3 8.9 - 12.9 FL    NRBC 0.0 0  WBC    ABSOLUTE NRBC 0.00 0.00 - 0.01 K/uL    NEUTROPHILS 57 32 - 75 %    LYMPHOCYTES 31 12 - 49 %    MONOCYTES 9 5 - 13 %    EOSINOPHILS 2 0 - 7 %    BASOPHILS 1 0 - 1 %    IMMATURE GRANULOCYTES 0 0.0 - 0.5 %    ABS. NEUTROPHILS 3.5 1.8 - 8.0 K/UL    ABS. LYMPHOCYTES 1.9 0.8 - 3.5 K/UL    ABS.  MONOCYTES 0.6 0.0 - 1.0 K/UL    ABS. EOSINOPHILS 0.1 0.0 - 0.4 K/UL    ABS. BASOPHILS 0.1 0.0 - 0.1 K/UL    ABS. IMM. GRANS. 0.0 0.00 - 0.04 K/UL    DF AUTOMATED     METABOLIC PANEL, COMPREHENSIVE    Collection Time: 01/25/22  9:37 PM   Result Value Ref Range    Sodium 138 136 - 145 mmol/L    Potassium 4.6 3.5 - 5.1 mmol/L    Chloride 105 97 - 108 mmol/L    CO2 30 21 - 32 mmol/L    Anion gap 3 (L) 5 - 15 mmol/L    Glucose 114 (H) 65 - 100 mg/dL    BUN 23 (H) 6 - 20 MG/DL    Creatinine 1.13 (H) 0.55 - 1.02 MG/DL    BUN/Creatinine ratio 20 12 - 20      GFR est AA 56 (L) >60 ml/min/1.73m2    GFR est non-AA 46 (L) >60 ml/min/1.73m2    Calcium 9.7 8.5 - 10.1 MG/DL    Bilirubin, total 0.5 0.2 - 1.0 MG/DL    ALT (SGPT) 23 12 - 78 U/L    AST (SGOT) 20 15 - 37 U/L    Alk.  phosphatase 184 (H) 45 - 117 U/L    Protein, total 6.9 6.4 - 8.2 g/dL    Albumin 3.3 (L) 3.5 - 5.0 g/dL    Globulin 3.6 2.0 - 4.0 g/dL    A-G Ratio 0.9 (L) 1.1 - 2.2     SALICYLATE    Collection Time: 01/25/22  9:37 PM   Result Value Ref Range    Salicylate level <5.2 (L) 2.8 - 20.0 MG/DL   EKG, 12 LEAD, INITIAL    Collection Time: 01/25/22 10:11 PM   Result Value Ref Range    Ventricular Rate 82 BPM    Atrial Rate 82 BPM    P-R Interval 148 ms    QRS Duration 92 ms    Q-T Interval 384 ms    QTC Calculation (Bezet) 448 ms    Calculated P Axis 54 degrees    Calculated R Axis 1 degrees    Calculated T Axis 60 degrees    Diagnosis       Normal sinus rhythm  Cannot rule out Anterior infarct , age undetermined  When compared with ECG of 23-MAY-2017 20:19,  T wave inversion no longer evident in Inferior leads  Nonspecific T wave abnormality no longer evident in Anterior leads     URINALYSIS W/ REFLEX CULTURE    Collection Time: 01/25/22 10:43 PM    Specimen: Urine   Result Value Ref Range    Color YELLOW/STRAW      Appearance CLEAR CLEAR      Specific gravity 1.016 1.003 - 1.030      pH (UA) 6.5 5.0 - 8.0      Protein Negative NEG mg/dL    Glucose Negative NEG mg/dL    Ketone Negative NEG mg/dL    Bilirubin Negative NEG      Blood MODERATE (A) NEG      Urobilinogen 1.0 0.2 - 1.0 EU/dL    Nitrites Negative NEG      Leukocyte Esterase MODERATE (A) NEG      WBC 10-20 0 - 4 /hpf    RBC 10-20 0 - 5 /hpf    Epithelial cells FEW FEW /lpf    Bacteria Negative NEG /hpf    UA:UC IF INDICATED URINE CULTURE ORDERED (A) CNI      Hyaline cast 0-2 0 - 5 /lpf       Radiologic Studies -   CT HEAD WO CONT   Final Result   1. No acute intracranial hemorrhage. 2. Age-indeterminate microvascular ischemic disease in the periventricular white   matter. CT Results  (Last 48 hours)               01/25/22 2316  CT HEAD WO CONT Final result    Impression:  1. No acute intracranial hemorrhage. 2. Age-indeterminate microvascular ischemic disease in the periventricular white   matter. Narrative:  EXAM:  CT HEAD WO CONT       INDICATION: Hallucinations. COMPARISON: None. TECHNIQUE: Axial noncontrast head CT from foramen magnum to vertex. Coronal and   sagittal reformatted images were obtained. CT dose reduction was achieved   through use of a standardized protocol tailored for this examination and   automatic exposure control for dose modulation. FINDINGS:  There is diffuse age-related parenchymal volume loss. The ventricles   and sulci are age-appropriate without hydrocephalus. There is no mass effect or   midline shift. There is no intracranial hemorrhage or extra-axial fluid   collection. Scattered foci of low attenuation in the periventricular white   matter most likely represent age-indeterminate microvascular ischemic changes. The gray-white matter differentiation is maintained. The basal cisterns are   patent. The osseous structures are intact. The visualized paranasal sinuses and mastoid   air cells are clear. CXR Results  (Last 48 hours)    None            Medical Decision Making   I am the first provider for this patient.     I reviewed the vital signs, available nursing notes, past medical history, past surgical history, family history and social history. Vital Signs-Reviewed the patient's vital signs. Patient Vitals for the past 12 hrs:   Temp Pulse Resp BP SpO2   01/25/22 2053 98.5 °F (36.9 °C) 97 18 (!) 158/77 98 %       Pulse Oximetry Analysis - 98% on RA Normal    Records Reviewed/Interpretted: Nursing Notes from triage and Old Medical Records, noting previous EGD by GI in September    Provider Notes (Medical Decision Making):     DDX:  UTI, electrolyte imbalance, chronic stroke, dementia, depression    Plan:  Labs, head CT, UA, EKG, Tylenol salicylate levels    Impression:  paranoia    ED Course:   Initial assessment performed. The patients presenting problems have been discussed, and they are in agreement with the care plan formulated and outlined with them. I have encouraged them to ask questions as they arise throughout their visit. I reviewed our electronic medical record system for any past medical records that were available that may contribute to the patients current condition, the nursing notes and and vital signs from today's visit  Nursing notes will be reviewed as they become available in realtime while the pt has been in the ED. Jose Martinez MD    I personally reviewed/interpreted pt's imaging. Agree with official read by radiology as noted above. Jose Martinez MD      11:15 PM   Progress note:  Pt noted to be ready for discharge. Discussed lab and imaging findings with pt, specifically noting head CT and no significant findings on lab work. Pt will follow up with neurology as instructed. All questions have been answered, pt voiced understanding and agreement with plan. Specific return precautions provided in addition to instructions for pt to return to the ED immediately should sx worsen at any time. Jose Martinez MD             Critical Care Time:     none      Diagnosis     Clinical Impression:   1. Jaime ([facility].)        PLAN:  1. Current Discharge Medication List      START taking these medications    Details   hydrOXYzine HCL (ATARAX) 50 mg tablet Take 1 Tablet by mouth every six (6) hours as needed for Anxiety or Sleep for up to 10 days. Qty: 20 Tablet, Refills: 0  Start date: 1/25/2022, End date: 2/4/2022           2. Follow-up Information     Follow up With Specialties Details Why Contact Info    Jacky Gamboa MD Family Medicine Schedule an appointment as soon as possible for a visit in 2 days  [address]      Leda Montilla MD Neurology Schedule an appointment as soon as possible for a visit in 1 day  [address] [address], [facility] [address]      Mando Vegas DO Neurology Schedule an appointment as soon as possible for a visit in 1 day  [address] [facility]  [phone]      [facility] EMERGENCY DEPT Emergency Medicine  As needed [address] [address] [facility]  [phone]        Return to ED if worse     Disposition:    11:16 PM   The patient's results have been reviewed with family and/or caregiver. They verbally convey their understanding and agreement of the patient's signs, symptoms, diagnosis, treatment and prognosis and additionally agree to follow up as recommended in the discharge instructions or to return to the Emergency Room should the patient's condition change prior to their follow-up appointment. The family and/or caregiver verbally agrees with the care-plan and all of their questions have been answered. The discharge instructions have also been provided to the them with educational information regarding the patient's diagnosis as well a list of reasons why the patient would want to return to the ER prior to their follow-up appointment should their condition change.   Lilo Castillo MD

## 2022-01-26 NOTE — BSMART NOTE
Comprehensive Assessment Form Part 1      Section I - Disposition    Axis I - R/O Dementia   Axis II - Deffered  Axis III -   Past Medical History:   Diagnosis Date    Chronic pain      GERD (gastroesophageal reflux disease)      Hypercholesteremia      Hypertension      Ill-defined condition       DJD    Ill-defined condition       Diverticulosis           The Medical Doctor to Psychiatrist conference was not completed. The Medical Doctor is in agreement with Psychiatrist disposition because of (reason) patient does not meet criteria for psychiatric admission. The plan is to discharge in care of family and they will connect with resources. The on-call Psychiatrist consulted was Dr. Jaylen Warner. The admitting Psychiatrist will be Dr. Jaylen Warner. The admitting Diagnosis is none. The Payor source is [facility] [place] MEDICARE PART A & B. The name of the representative was . This was . This writer reviewed the [place] [address] in nursing flowsheet and the risk level assigned is no risk. Based on this assessment, the risk of suicide is no risk and the plan is referral for neurology . Section II - Integrated Summary  Summary:  Patient is 79year old female reporting to EDpt presents w/ son. pt son reports pt has been cleared by PCP and has no UTI. pt son reports over the last few months she has been calling the police because she doesnt feel safe at home and is hallucinating, pt is unable to live alone at this point. son reports they have been unable to get in w/ neurology. At bedside with patient's daughter in law and son at bedside. Patient denied suicidal, homicidal thoughts and hallucinations. Patient reported that since November she has been hearing things in her home and yard as reported she believes it is the neighbor's son as the son came to live next to her.  Patient reported she had a scare most recently as reported he is outside at 2:30am by her bedroom window and he has been messing with her electricity and stealing it. Patient stated she hears noises under the house, people under there messing with stuff, drilling at night. Patient stated I think he does not sleep at night. Patient reported she always hears him and hears things but she has never seen him in her yard or home. Patient reported feeling safe with herself but she gets scared because of reported noises. Patient reported she hasnot been sleeping well because of the noises and her eating is not like it use to be but she does eat. Patient lives a lone and cares for herself. As reported by son they have attempted to get an appointment with Neurology but the appointments are far out. Patient's son reported she has called the police at least three times that he knows of because of noise but they have not found anything. As reported they have looked around and they have not found anything that she reports to be true. As reported patient reports people have been in her garage and stealing things. As reported also people hiding drugs under her house. Son and wife expressed concern about her reported hallucinations because they have not found any evidence of what she reports and they have not been able to get the appointment. The patienthas demonstrated mental capacity to provide informed consent. The information is given by the patient and relative(s). The Chief Complaint is hallucinations, paranoia . The Precipitant Factors are possible early stages of dementia. Previous Hospitalizations: no  The patient has not previously been in restraints. Current Psychiatrist and/or  is none. Lethality Assessment:    The potential for suicide noted by the following: not noted . The potential for homicide is not noted. The patient has not been a perpetrator of sexual or physical abuse. There are not pending charges. The patient is not felt to be at risk for self harm or harm to others.   The attending nurse was advised not noted. Section III - Psychosocial  The patient's overall mood and attitude is good mood, calm and cooperative. Feelings of helplessness and hopelessness are not observed. Generalized anxiety is not observed. Panic is not observed. Phobias are not observed. Obsessive compulsive tendencies are not observed. Section IV - Mental Status Exam  The patient's appearance shows no evidence of impairment. The patient's behavior shows no evidence of impairment. The patient is oriented to time, place, person and situation. The patient's speech shows no evidence of impairment. The patient's mood is euthymic. The range of affect shows no evidence of impairment. The patient's thought content demonstrates paranoia. The thought process shows no evidence of impairment. The patient's perception demonstrated changes in the following:  hallucinations. The patient's memory shows no evidence of impairment. The patient's appetite shows no evidence of impairment. The patient's sleep shows no evidence of impairment. The patient's insight shows no evidence of impairment. The patient's judgement shows no evidence of impairment. Section V - Substance Abuse  The patient is not using substances. The patient is using not noted. The patient has experienced the following withdrawal symptoms: N/A. Section VI - Living Arrangements  The patient is single. The patient lives alone. The patient has adult children. The patient does plan to return home upon discharge. The patient does not have legal issues pending. The patient's source of income comes from disability. Lutheran and cultural practices have not been voiced at this time. The patient's greatest support comes from her adult children and this person will be involved with the treatment.     The patient has not been in an event described as horrible or outside the realm of ordinary life experience either currently or in the past.  The patient has not been a victim of sexual/physical abuse. Section VII - Other Areas of Clinical Concern  The highest grade achieved is not assessed with the overall quality of school experience being described as not assessed. The patient is currently unemployed and speaks [place] as a primary language. The patient has no communication impairments affecting communication. The patient's preference for learning can be described as: can read and write adequately.   The patient's hearing is normal.  The patient's vision is normal.      Portillo Ramachandran

## 2022-01-27 ENCOUNTER — TELEPHONE (OUTPATIENT)
Dept: NEUROLOGY | Age: 82
End: 2022-01-27

## 2022-01-27 LAB
BACTERIA SPEC CULT: NORMAL
SERVICE CMNT-IMP: NORMAL

## 2022-01-27 NOTE — TELEPHONE ENCOUNTER
Hi,    This patient has not been seen by a neurologist and needs to be a new patient visit. It can be seen by any neurologist at any of our locations. Thanks.     Katya Butler

## 2022-01-27 NOTE — TELEPHONE ENCOUNTER
[place] was [place] [id number] [place] [facility] [address] [facility] ED on 1/25. Is having problems with hallucinations. ED psychiatrist (virtually) stated it is not psychiatric in nature. ED requested appt with us be moved up from June. (6/21/22) Hallucinations have progressively gotten worse over the last two weeks.  Can she be seen for a hosp f/u? [phone]

## 2022-01-27 NOTE — TELEPHONE ENCOUNTER
Called [facility] back. Relayed Dr. Evan Evans message. They are keeping the June appt for now but will call if they find an appt somewhere else sooner.

## 2022-02-21 ENCOUNTER — APPOINTMENT (OUTPATIENT)
Dept: CT IMAGING | Age: 82
End: 2022-02-21
Attending: EMERGENCY MEDICINE
Payer: MEDICARE

## 2022-02-21 ENCOUNTER — HOSPITAL ENCOUNTER (EMERGENCY)
Age: 82
Discharge: HOME | End: 2022-02-21
Attending: EMERGENCY MEDICINE | Admitting: EMERGENCY MEDICINE
Payer: MEDICARE

## 2022-02-21 VITALS
TEMPERATURE: 98.3 F | RESPIRATION RATE: 16 BRPM | DIASTOLIC BLOOD PRESSURE: 108 MMHG | HEART RATE: 89 BPM | SYSTOLIC BLOOD PRESSURE: 144 MMHG | OXYGEN SATURATION: 95 %

## 2022-02-21 DIAGNOSIS — F01.50 VASCULAR DEMENTIA WITHOUT BEHAVIORAL DISTURBANCE (HCC): Primary | ICD-10-CM

## 2022-02-21 LAB
ALBUMIN SERPL-MCNC: 3.6 G/DL (ref 3.5–5)
ALBUMIN/GLOB SERPL: 0.9 {RATIO} (ref 1.1–2.2)
ALP SERPL-CCNC: 204 U/L (ref 45–117)
ALT SERPL-CCNC: 23 U/L (ref 12–78)
ANION GAP SERPL CALC-SCNC: 5 MMOL/L (ref 5–15)
APAP SERPL-MCNC: 6 UG/ML (ref 10–30)
AST SERPL-CCNC: 20 U/L (ref 15–37)
BASOPHILS # BLD: 0 K/UL (ref 0–0.1)
BASOPHILS NFR BLD: 1 % (ref 0–1)
BILIRUB SERPL-MCNC: 0.8 MG/DL (ref 0.2–1)
BUN SERPL-MCNC: 23 MG/DL (ref 6–20)
BUN/CREAT SERPL: 28 (ref 12–20)
CALCIUM SERPL-MCNC: 9.7 MG/DL (ref 8.5–10.1)
CHLORIDE SERPL-SCNC: 105 MMOL/L (ref 97–108)
CO2 SERPL-SCNC: 27 MMOL/L (ref 21–32)
COMMENT, HOLDF: NORMAL
CREAT SERPL-MCNC: 0.81 MG/DL (ref 0.55–1.02)
DIFFERENTIAL METHOD BLD: NORMAL
EOSINOPHIL # BLD: 0.1 K/UL (ref 0–0.4)
EOSINOPHIL NFR BLD: 2 % (ref 0–7)
ERYTHROCYTE [DISTWIDTH] IN BLOOD BY AUTOMATED COUNT: 12.4 % (ref 11.5–14.5)
ETHANOL SERPL-MCNC: <10 MG/DL
GLOBULIN SER CALC-MCNC: 4.2 G/DL (ref 2–4)
GLUCOSE SERPL-MCNC: 107 MG/DL (ref 65–100)
HCT VFR BLD AUTO: 40.2 % (ref 35–47)
HGB BLD-MCNC: 12.6 G/DL (ref 11.5–16)
IMM GRANULOCYTES # BLD AUTO: 0 K/UL (ref 0–0.04)
IMM GRANULOCYTES NFR BLD AUTO: 0 % (ref 0–0.5)
LACTATE SERPL-SCNC: 0.9 MMOL/L (ref 0.4–2)
LYMPHOCYTES # BLD: 2 K/UL (ref 0.8–3.5)
LYMPHOCYTES NFR BLD: 29 % (ref 12–49)
MCH RBC QN AUTO: 30.3 PG (ref 26–34)
MCHC RBC AUTO-ENTMCNC: 31.3 G/DL (ref 30–36.5)
MCV RBC AUTO: 96.6 FL (ref 80–99)
MONOCYTES # BLD: 0.6 K/UL (ref 0–1)
MONOCYTES NFR BLD: 9 % (ref 5–13)
NEUTS SEG # BLD: 4.1 K/UL (ref 1.8–8)
NEUTS SEG NFR BLD: 59 % (ref 32–75)
NRBC # BLD: 0 K/UL (ref 0–0.01)
NRBC BLD-RTO: 0 PER 100 WBC
PLATELET # BLD AUTO: 278 K/UL (ref 150–400)
PMV BLD AUTO: 8.9 FL (ref 8.9–12.9)
POTASSIUM SERPL-SCNC: 3.8 MMOL/L (ref 3.5–5.1)
PROT SERPL-MCNC: 7.8 G/DL (ref 6.4–8.2)
RBC # BLD AUTO: 4.16 M/UL (ref 3.8–5.2)
SALICYLATES SERPL-MCNC: <1.7 MG/DL (ref 2.8–20)
SAMPLES BEING HELD,HOLD: NORMAL
SODIUM SERPL-SCNC: 137 MMOL/L (ref 136–145)
WBC # BLD AUTO: 6.9 K/UL (ref 3.6–11)

## 2022-02-21 PROCEDURE — 83605 ASSAY OF LACTIC ACID: CPT

## 2022-02-21 PROCEDURE — 36415 COLL VENOUS BLD VENIPUNCTURE: CPT

## 2022-02-21 PROCEDURE — 80143 DRUG ASSAY ACETAMINOPHEN: CPT

## 2022-02-21 PROCEDURE — 80053 COMPREHEN METABOLIC PANEL: CPT

## 2022-02-21 PROCEDURE — 80179 DRUG ASSAY SALICYLATE: CPT

## 2022-02-21 PROCEDURE — 99283 EMERGENCY DEPT VISIT LOW MDM: CPT

## 2022-02-21 PROCEDURE — 82077 ASSAY SPEC XCP UR&BREATH IA: CPT

## 2022-02-21 PROCEDURE — 87040 BLOOD CULTURE FOR BACTERIA: CPT

## 2022-02-21 PROCEDURE — 70450 CT HEAD/BRAIN W/O DYE: CPT

## 2022-02-21 PROCEDURE — 85025 COMPLETE CBC W/AUTO DIFF WBC: CPT

## 2022-02-21 NOTE — ED PROVIDER NOTES
76-year-old female history of chronic pain, GERD, high cholesterol, hypertension, diverticulosis presents to the emergency department with her family with concern for seeing things, hearing things. Patient tells me that she hears her neighbors working at her house, using tools, going into her garage and stealing things. She manifest some element of paranoia with concerns that all the neighborhood is trying to watch her and get into her garage. This has been going on and worsening for the past several months. She was seen by primary care who recommended neurology. She was seen at an outside emergency department with reassuring work-up and told to follow-up with neurology. Since that time she has been having more frequent episodes which occur now also during the day sometimes. Patient has called the police several times. The history is provided by the patient, a relative and medical records. Altered mental status   This is a new problem. The problem has been gradually worsening. Associated symptoms include confusion and hallucinations. Pertinent negatives include no weakness and no agitation. Mental status baseline is normal.  Her past medical history does not include dementia, psychotropic medication treatment or head trauma.         Past Medical History:   Diagnosis Date    Chronic pain     GERD (gastroesophageal reflux disease)     Hypercholesteremia     Hypertension     Ill-defined condition     DJD    Ill-defined condition     Diverticulosis       Past Surgical History:   Procedure Laterality Date    HX DILATION AND CURETTAGE      HX HEENT  2001    Oral surgery - gum disease    HX ORTHOPAEDIC Right 2006    Trigger finger repair    UPPER GI ENDOSCOPY,BIOPSY  9/27/2021              Family History:   Problem Relation Age of Onset    Stroke Mother     Heart Attack Father         age [de-identified]       Social History     Socioeconomic History    Marital status:      Spouse name: Not on file    Number of children: Not on file    Years of education: Not on file    Highest education level: Not on file   Occupational History    Not on file   Tobacco Use    Smoking status: Never Smoker    Smokeless tobacco: Never Used   Vaping Use    Vaping Use: Never used   Substance and Sexual Activity    Alcohol use: No    Drug use: No    Sexual activity: Not on file   Other Topics Concern     Service Not Asked    Blood Transfusions Not Asked    Caffeine Concern Not Asked    Occupational Exposure Not Asked    Hobby Hazards Not Asked    Sleep Concern Not Asked    Stress Concern Not Asked    Weight Concern Not Asked    Special Diet Not Asked    Back Care Not Asked    Exercise Not Asked    Bike Helmet Not Asked   [address] Not Asked    Self-Exams Not Asked   Social History Narrative    Not on file     Social Determinants of Health     Financial Resource Strain:     Difficulty of Paying Living Expenses: Not on file   Food Insecurity:     Worried About Running Out of Food in the Last Year: Not on file    Nathan of Food in the Last Year: Not on file   Transportation Needs:     Lack of Transportation (Medical): Not on file    Lack of Transportation (Non-Medical):  Not on file   Physical Activity:     Days of Exercise per Week: Not on file    Minutes of Exercise per Session: Not on file   Stress:     Feeling of Stress : Not on file   Social Connections:     Frequency of Communication with Friends and Family: Not on file    Frequency of Social Gatherings with Friends and Family: Not on file    Attends Scientology Services: Not on file    Active Member of Clubs or Organizations: Not on file    Attends Club or Organization Meetings: Not on file    Marital Status: Not on file   Intimate Partner Violence:     Fear of Current or Ex-Partner: Not on file    Emotionally Abused: Not on file    Physically Abused: Not on file    Sexually Abused: Not on file   Housing Stability:     Unable to Pay for Housing in the Last Year: Not on file    Number of Places Lived in the Last Year: Not on file    Unstable Housing in the Last Year: Not on file         ALLERGIES: Aspirin, Cephalexin, and Penicillins    Review of Systems   Constitutional: Negative for fatigue and fever. HENT: Negative for sneezing and sore throat. Respiratory: Negative for cough and shortness of breath. Cardiovascular: Negative for chest pain and leg swelling. Gastrointestinal: Negative for abdominal pain, diarrhea, nausea and vomiting. Genitourinary: Negative for difficulty urinating and dysuria. Musculoskeletal: Negative for arthralgias and myalgias. Skin: Negative for color change and rash. Neurological: Negative for weakness and headaches. Psychiatric/Behavioral: Positive for confusion and hallucinations. Negative for agitation and behavioral problems. Vitals:    02/21/22 1506   BP: (!) 178/84   Pulse: 89   Resp: 16   Temp: 98.3 °F (36.8 °C)   SpO2: 100%            Physical Exam  Vitals and nursing note reviewed. Constitutional:       General: She is not in acute distress. Appearance: Normal appearance. She is well-developed. She is not ill-appearing, toxic-appearing or diaphoretic. HENT:      Head: Normocephalic and atraumatic. Nose: Nose normal.      Mouth/Throat:      Mouth: Mucous membranes are moist.      Pharynx: Oropharynx is clear. Eyes:      Extraocular Movements: Extraocular movements intact. Conjunctiva/sclera: Conjunctivae normal.      Pupils: Pupils are equal, round, and reactive to light. Cardiovascular:      Rate and Rhythm: Normal rate and regular rhythm. Pulses: Normal pulses. Heart sounds: Normal heart sounds. Pulmonary:      Effort: Pulmonary effort is normal. No respiratory distress. Breath sounds: Normal breath sounds. No wheezing. Chest:      Chest wall: No tenderness. Abdominal:      General: Abdomen is flat. There is no distension. Palpations: Abdomen is soft. Tenderness: There is no abdominal tenderness. There is no guarding or rebound. Musculoskeletal:         General: No swelling, tenderness, deformity or signs of injury. Normal range of motion. Cervical back: Normal range of motion and neck supple. No rigidity. No muscular tenderness. Right lower leg: No edema. Left lower leg: No edema. Skin:     General: Skin is warm and dry. Capillary Refill: Capillary refill takes less than 2 seconds. Neurological:      General: No focal deficit present. Mental Status: She is alert and oriented to person, place, and time. Psychiatric:         Mood and Affect: Mood normal.         Behavior: Behavior normal. Behavior is cooperative. Thought Content: Thought content is paranoid. MDM  Number of Diagnoses or Management Options  Vascular dementia without behavioral disturbance [facility])  Diagnosis management comments: 79-year-old female presents as above with evidence of suspected vascular dementia. Discussed with the family and the patient bedside.   Referred to neurology, follow-up with primary care, return if needed       Amount and/or Complexity of Data Reviewed  Clinical lab tests: reviewed  Tests in the radiology section of CPT®: reviewed           Procedures

## 2022-02-21 NOTE — ED TRIAGE NOTES
Triage: Pt arrives ambulatory with her cane accompanied by her daughter. Per her daughter pt has been having hallucinations and some paranoia. She is specifically concerned about a neighbor she believes is stealing her electricity and things from her garage. The daughter hasn't found any evidence of these events. The patient was seen at [facility] in January with these complaints and infection, UTI, and CVA were ruled out. The patient was referred to neurology for further. The daughter has called neurology clinics and the earliest they can get in is April. Per the daughter the patient's behavior is escalating over the last few days.

## 2022-02-21 NOTE — BSMART NOTE
Writer just notified by attending nurse consult placed. 5- Spoke with attending physician who felt Bsmart consult would not be beneficial at this time.

## 2022-02-22 ENCOUNTER — DOCUMENTATION ONLY (OUTPATIENT)
Dept: CASE MANAGEMENT | Age: 82
End: 2022-02-22

## 2022-02-22 NOTE — DISCHARGE INSTRUCTIONS
Thank you for allowing us to provide you with medical care today. We realize that you have many choices for your emergency care needs. We thank you for choosing [facility]. Please choose us in the future for any continued health care needs. We hope we addressed all of your medical concerns. We strive to provide excellent quality care in the Emergency Department. Anything less than excellent does not meet our expectations. The exam and treatment you received in the Emergency Department were for an emergent problem and are not intended as complete care. It is important that you follow up with a doctor, nurse practitioner, or physician's assistant for ongoing care. If your symptoms worsen or you do not improve as expected and you are unable to reach your usual health care provider, you should return to the Emergency Department. We are available 24 hours a day. Take this sheet with you when you go to your follow-up visit. If you have any problem arranging the follow-up visit, contact the Emergency Department immediately. Make an appointment your family doctor for follow up of this visit. Return to the ER if you are unable to be seen in a timely manner.

## 2022-02-22 NOTE — PROGRESS NOTES
Appreciate Senior Services Consult    THANG placed all to son, Grisel Renee, we discussed available resources through [employer]. He is familiar with the services offered through [place] [id number] and he shared with sister. THANG placed call to Neurology Office/[place], spoke with [facility] [place]. She was very helpful and found an appointment with Dr. Zenia Ayala for tomorrow 2/23/22. THANG appreciates her advocating for this patient and family.     Dr. Zenia Ayala  2/23/22 12:00/Noon Arrival time 12:20 Appointment Time  [facility]       Kaecedric Kellyjoão, [facility], BSN, [facility]  ED Care Management  [phone]

## 2022-02-23 ENCOUNTER — OFFICE VISIT (OUTPATIENT)
Dept: NEUROLOGY | Age: 82
End: 2022-02-23
Payer: MEDICARE

## 2022-02-23 VITALS
RESPIRATION RATE: 14 BRPM | OXYGEN SATURATION: 98 % | SYSTOLIC BLOOD PRESSURE: 133 MMHG | HEIGHT: 64 IN | WEIGHT: 120 LBS | BODY MASS INDEX: 20.49 KG/M2 | DIASTOLIC BLOOD PRESSURE: 69 MMHG | TEMPERATURE: 97.8 F

## 2022-02-23 DIAGNOSIS — R44.3 HALLUCINATIONS: ICD-10-CM

## 2022-02-23 DIAGNOSIS — R41.0 CONFUSION: Primary | ICD-10-CM

## 2022-02-23 PROCEDURE — G8420 CALC BMI NORM PARAMETERS: HCPCS | Performed by: PSYCHIATRY & NEUROLOGY

## 2022-02-23 PROCEDURE — G8754 DIAS BP LESS 90: HCPCS | Performed by: PSYCHIATRY & NEUROLOGY

## 2022-02-23 PROCEDURE — 1090F PRES/ABSN URINE INCON ASSESS: CPT | Performed by: PSYCHIATRY & NEUROLOGY

## 2022-02-23 PROCEDURE — 1101F PT FALLS ASSESS-DOCD LE1/YR: CPT | Performed by: PSYCHIATRY & NEUROLOGY

## 2022-02-23 PROCEDURE — G8400 PT W/DXA NO RESULTS DOC: HCPCS | Performed by: PSYCHIATRY & NEUROLOGY

## 2022-02-23 PROCEDURE — G8536 NO DOC ELDER MAL SCRN: HCPCS | Performed by: PSYCHIATRY & NEUROLOGY

## 2022-02-23 PROCEDURE — G8427 DOCREV CUR MEDS BY ELIG CLIN: HCPCS | Performed by: PSYCHIATRY & NEUROLOGY

## 2022-02-23 PROCEDURE — 99204 OFFICE O/P NEW MOD 45 MIN: CPT | Performed by: PSYCHIATRY & NEUROLOGY

## 2022-02-23 PROCEDURE — G8752 SYS BP LESS 140: HCPCS | Performed by: PSYCHIATRY & NEUROLOGY

## 2022-02-23 PROCEDURE — G8432 DEP SCR NOT DOC, RNG: HCPCS | Performed by: PSYCHIATRY & NEUROLOGY

## 2022-02-23 RX ORDER — HYDROXYZINE HYDROCHLORIDE 10 MG/1
25 TABLET, FILM COATED ORAL
Status: ON HOLD | COMMUNITY
Start: 2022-01-12 | End: 2022-04-12

## 2022-02-23 RX ORDER — SUCRALFATE 1 G/1
1 TABLET ORAL 3 TIMES DAILY
Status: ON HOLD | COMMUNITY
Start: 2021-12-21 | End: 2022-04-18 | Stop reason: SDUPTHER

## 2022-02-23 NOTE — PROGRESS NOTES
[facility] Neurology Clinics and [address] at [facility] Neurology Clinics at [address] [facility], [address] [facility] [address] [facility], [address] [place] [address]  [phone] Office  [phone] Facsimile           Referring: ED    Chief Complaint   Patient presents with   [address] [facility] [address] Patient     [facility] ER 2/21 r/t vascular dementia // here today w/ son      29-year-old lady who presents today accompanied by her son who also provides history for evaluation of confusion and hallucinations. She tells me that her family thinks she is seeing things that she is not seeing. She tells me that a man moved in next-door with his mother and he is about 47. She thinks he has been stealing electricity and her satellite. She says she heard him outside of her window plugging in the electrical cord. She thinks things are missing from the garage. She thinks someone is under her house and she hears drills and sawing and assuming she says she is going to call the police they stop. They know exactly where she is in the house. Her son notes that this started in October when she reported concerns about the neighbor stealing electricity because he has a trailer in the front yard. Things were investigated and found no evidence of what she was complaining. Additionally he has set up game cameras in the garage where she thinks things are being stolen. The garage is locked down. The 's deputy and the son walked the backyard in the snow when the patient reported someone was outside and there was no one there no footprints in the snow. A  has gone under the house and found nothing out of the ordinary. She is misplacing things. She loses her keys. She is having short-term memory difficulty. She has called the police x3 in the last 2 weeks.     Emergency department visit February 21 of this year where the patient came in seeing and hearing things. She was said to be paranoid. Having frequent episodes. CT of the head with nothing acute to my personal review  Metabolic panel unremarkable  CBC unremarkable  Lactic acid normal  Blood culture no growth    Emergency department visit January 25 where she presented with auditory and visual hallucinations. She was evaluated by be smart    Past Medical History:   Diagnosis Date    Chronic pain     GERD (gastroesophageal reflux disease)     Hypercholesteremia     Hypertension     Ill-defined condition     DJD    Ill-defined condition     Diverticulosis       Past Surgical History:   Procedure Laterality Date    HX DILATION AND CURETTAGE      HX HEENT  2001    Oral surgery - gum disease    HX ORTHOPAEDIC Right 2006    Trigger finger repair    UPPER GI ENDOSCOPY,BIOPSY  9/27/2021            Current Outpatient Medications   Medication Sig Dispense Refill    sucralfate (CARAFATE) 1 gram tablet TAKE 1 TABLET BY MOUTH FOUR TIMES A DAY FOR STOMACH ULCER      clonazePAM (KlonoPIN) 0.5 mg tablet Take 0.5 mg by mouth as needed for Anxiety.  acetaminophen (TYLENOL) 500 mg tablet Take 500 mg by mouth every six (6) hours as needed for Pain.  famotidine (PEPCID) 40 mg tablet Take 40 mg by mouth two (2) times a day.  acetaminophen/diphenhydramine (TYLENOL PM EXTRA STRENGTH PO) Take 1 Tablet by mouth nightly as needed.  atorvastatin (LIPITOR) 20 mg tablet Take 20 mg by mouth daily.  lisinopril (PRINIVIL, ZESTRIL) 10 mg tablet Take 10 mg by mouth daily.  furosemide (LASIX) 40 mg tablet Take  by mouth daily as needed.  multivitamin with iron tablet Take 1 Tab by mouth daily.  hydrOXYzine HCL (ATARAX) 10 mg tablet Take 10 mg by mouth nightly.  (Patient not taking: Reported on 2/23/2022)          Allergies   Allergen Reactions    Aspirin Other (comments)     ulcers    Cephalexin Hives    Penicillins Anaphylaxis       Social History     Tobacco Use    Smoking status: Never Smoker    Smokeless tobacco: Never Used   Vaping Use    Vaping Use: Never used   Substance Use Topics    Alcohol use: No    Drug use: No       Family History   Problem Relation Age of Onset    Stroke Mother     Heart Attack Father         age [de-identified]       Review of Systems  Pertinent positives and negatives as noted. Examination  Visit Vitals  /69 (BP 1 Location: Left upper arm, BP Patient Position: Sitting)   Temp 97.8 °F (36.6 °C) (Temporal)   Resp 14   Ht 5' 4\" (1.626 m)   Wt 54.4 kg (120 lb)   SpO2 98%   BMI 20.60 kg/m²     She is a pleasant well-appearing lady. Appropriately dressed and appropriately groomed. No icterus. Oropharynx clear moist.  No edema. She is oriented to February 23, 2022. She recalls the name of the president when I give her a clue. Registration 3/3 recall 2/3 and 2/3 with clues. She calculates properly. She is unsure of the floor we are on. Cranial nerves are intact. No pronation. No drift. Strength is full throughout. Reflexes symmetrical.  No pathologic reflex. No ataxia      Impression/Plan  79-year-old lady with short-term memory difficulty and vivid hallucinations  No changes in medication etc. to explain  Question dementia such as an Alzheimer's type dementia with hallucination versus Lewy body type although she has no parkinsonian features versus depression with psychosis versus structural versus other  MRI of the brain  Carotid Doppler  EEG  Formal neurocognitive eval  Follow-up after    Lucy Mcfadden MD          This note was created using voice recognition software. Despite editing, there may be syntax errors.

## 2022-02-26 LAB
BACTERIA SPEC CULT: NORMAL
SERVICE CMNT-IMP: NORMAL

## 2022-03-01 ENCOUNTER — OFFICE VISIT (OUTPATIENT)
Dept: NEUROLOGY | Age: 82
End: 2022-03-01

## 2022-03-01 DIAGNOSIS — R41.0 CONFUSION: Primary | ICD-10-CM

## 2022-03-01 DIAGNOSIS — R44.3 HALLUCINATIONS: ICD-10-CM

## 2022-03-06 NOTE — PROCEDURES
EEG:      Date:  03/01/22    Requesting Physician:  Sly Sanchez MD     An EEG is requested in this 79year-old lady to evaluate for epileptiform abnormality. Medications:  Medications are said to include Atarax, Klonopin, Lipitor, Lisinopril, Lasix. This tracing is obtained during the awake, drowsy and sleeping states. During wakefulness there are intermittent runs of posteriorly dominant and symmetric low to medium amplitude 9 cycle per second activities, which attenuate with eye opening. Lower voltage, faster frequency activities are seen symmetrically over the anterior head regions. Hyperventilation is not performed. Intermittent photic stimulation induces symmetric posterior driving responses. Stage 2 sleep is attained. Interpretation:   This EEG recorded during the awake, drowsy and sleeping states is normal.

## 2022-03-07 ENCOUNTER — HOSPITAL ENCOUNTER (OUTPATIENT)
Dept: MRI IMAGING | Age: 82
Discharge: HOME | End: 2022-03-07
Attending: PSYCHIATRY & NEUROLOGY
Payer: MEDICARE

## 2022-03-07 DIAGNOSIS — R41.0 CONFUSION: ICD-10-CM

## 2022-03-07 DIAGNOSIS — R44.3 HALLUCINATIONS: ICD-10-CM

## 2022-03-07 PROCEDURE — 70551 MRI BRAIN STEM W/O DYE: CPT

## 2022-03-19 PROBLEM — I10 ESSENTIAL HYPERTENSION: Status: ACTIVE | Noted: 2017-05-25

## 2022-03-19 PROBLEM — R07.2 PRECORDIAL PAIN: Status: ACTIVE | Noted: 2017-05-25

## 2022-03-20 PROBLEM — E78.2 MIXED HYPERLIPIDEMIA: Status: ACTIVE | Noted: 2017-05-25

## 2022-03-24 ENCOUNTER — VIRTUAL VISIT (OUTPATIENT)
Dept: NEUROLOGY | Age: 82
End: 2022-03-24
Payer: MEDICARE

## 2022-03-24 DIAGNOSIS — R41.3 MEMORY LOSS, SHORT TERM: Primary | ICD-10-CM

## 2022-03-24 DIAGNOSIS — R44.3 HALLUCINATIONS: ICD-10-CM

## 2022-03-24 DIAGNOSIS — F22 DELUSIONS (HCC): ICD-10-CM

## 2022-03-24 DIAGNOSIS — F51.02 INSOMNIA DUE TO PSYCHOLOGICAL STRESS: ICD-10-CM

## 2022-03-24 PROCEDURE — 99214 OFFICE O/P EST MOD 30 MIN: CPT | Performed by: NURSE PRACTITIONER

## 2022-03-24 PROCEDURE — G8432 DEP SCR NOT DOC, RNG: HCPCS | Performed by: NURSE PRACTITIONER

## 2022-03-24 PROCEDURE — G8428 CUR MEDS NOT DOCUMENT: HCPCS | Performed by: NURSE PRACTITIONER

## 2022-03-24 PROCEDURE — 1090F PRES/ABSN URINE INCON ASSESS: CPT | Performed by: NURSE PRACTITIONER

## 2022-03-24 PROCEDURE — G8400 PT W/DXA NO RESULTS DOC: HCPCS | Performed by: NURSE PRACTITIONER

## 2022-03-24 PROCEDURE — 1101F PT FALLS ASSESS-DOCD LE1/YR: CPT | Performed by: NURSE PRACTITIONER

## 2022-03-24 PROCEDURE — G8756 NO BP MEASURE DOC: HCPCS | Performed by: NURSE PRACTITIONER

## 2022-03-24 RX ORDER — OLANZAPINE 2.5 MG/1
2.5 TABLET ORAL
Qty: 30 TABLET | Refills: 2 | Status: SHIPPED | OUTPATIENT
Start: 2022-03-24 | End: 2022-04-18

## 2022-03-24 NOTE — PROGRESS NOTES
Mat Reeves is a 79 y.o. female who was seen by synchronous (real-time) audio-video technology on 3/24/2022 for Altered mental status    Assessment & Plan:   Diagnoses and all orders for this visit:    1. Memory loss, short term  -     REFERRAL TO NEUROPSYCHOLOGY    2. Hallucinations  -     OLANZapine (ZyPREXA) 2.5 mg tablet; Take 1 Tablet by mouth nightly.  -     REFERRAL TO NEUROPSYCHOLOGY    3. Delusions (HCC)  -     OLANZapine (ZyPREXA) 2.5 mg tablet; Take 1 Tablet by mouth nightly.  -     REFERRAL TO NEUROPSYCHOLOGY    4. 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 Will try to get her in earlier for neurocognitive assessment in our [place] office. In the meantime, she was started on a trial of Zyprexa for management of the delusions/hallucinations. This may provide some benefit with regard to sleep as well as her increased psychological stress. Follow up in about two weeks. Subjective: Follow up for for possible dementia with delusions and hallucinations. She was initially seen by Dr. Mehdi Garland. She was sent for MRI of the brain, EEG, and carotid doppler with essentially normal results. She is scheduled for formal neurocognitive assessment in September 9, 2022. At this point, she continues to have altered mental status, hallucinations, and short term memory loss. Her delusions per her children are quite detailed and started around the time her next door neighbor moved in. She seems very anxious about what she thinks is happening around her and is not sleeping as a result. Prior to Admission medications    Medication Sig Start Date End Date Taking? Authorizing Provider   hydrOXYzine HCL (ATARAX) 10 mg tablet Take 10 mg by mouth nightly.   Patient not taking: Reported on 2/23/2022 1/12/22   Provider, Historical   sucralfate (CARAFATE) 1 gram tablet TAKE 1 TABLET BY MOUTH FOUR TIMES A DAY FOR STOMACH ULCER 12/21/21   Provider, Historical clonazePAM (KlonoPIN) 0.5 mg tablet Take 0.5 mg by mouth as needed for Anxiety. Provider, Historical   acetaminophen (TYLENOL) 500 mg tablet Take 500 mg by mouth every six (6) hours as needed for Pain. Provider, Historical   famotidine (PEPCID) 40 mg tablet Take 40 mg by mouth two (2) times a day. Provider, Historical   acetaminophen/diphenhydramine (TYLENOL PM EXTRA STRENGTH PO) Take 1 Tablet by mouth nightly as needed. Provider, Historical   atorvastatin (LIPITOR) 20 mg tablet Take 20 mg by mouth daily. Provider, Historical   lisinopril (PRINIVIL, ZESTRIL) 10 mg tablet Take 10 mg by mouth daily. Provider, Historical   furosemide (LASIX) 40 mg tablet Take  by mouth daily as needed. Provider, Historical   multivitamin with iron tablet Take 1 Tab by mouth daily. Provider, Historical         ROS    Objective:   No flowsheet data found.      [INSTRUCTIONS:  \"[x]\" Indicates a positive item  \"[]\" Indicates a negative item  -- DELETE ALL ITEMS NOT EXAMINED]    Constitutional: [x] Appears well-developed and well-nourished [x] No apparent distress      [] Abnormal -     Mental status: [x] Alert and awake  [x] Oriented to person/place/time [x] Able to follow commands    [] Abnormal -     Eyes:   EOM    [x]  Normal    [] Abnormal -   Sclera  [x]  Normal    [] Abnormal -          Discharge [x]  None visible   [] Abnormal -     HENT: [x] Normocephalic, atraumatic  [] Abnormal -   [x] Mouth/Throat: Mucous membranes are moist    External Ears [x] Normal  [] Abnormal -    Neck: [x] No visualized mass [] Abnormal -     Pulmonary/Chest: [x] Respiratory effort normal   [x] No visualized signs of difficulty breathing or respiratory distress        [] Abnormal -      Musculoskeletal:   [x] Normal gait with no signs of ataxia         [x] Normal range of motion of neck        [] Abnormal -     Neurological:        [x] No Facial Asymmetry (Cranial nerve 7 motor function) (limited exam due to video visit) [x] No gaze palsy        [] Abnormal -          Skin:        [x] No significant exanthematous lesions or discoloration noted on facial skin         [] Abnormal -            Psychiatric:       [x] Normal Affect [] Abnormal -        [x] No Hallucinations    Other pertinent observable physical exam findings:-        We discussed the expected course, resolution and complications of the diagnosis(es) in detail. Medication risks, benefits, costs, interactions, and alternatives were discussed as indicated. I advised her to contact the office if her condition worsens, changes or fails to improve as anticipated. She expressed understanding with the diagnosis(es) and plan. Sammi Clark, was evaluated through a synchronous (real-time) audio-video encounter. The patient (or guardian if applicable) is aware that this is a billable service, which includes applicable co-pays. Verbal consent to proceed has been obtained. The visit was conducted pursuant to the emergency declaration under the [address] [place] [address] authority and the [employer] Act. Patient identification was verified, and a caregiver was present when appropriate. The patient was located at home in a state where the provider was licensed to provide care.       Shanda Rainey NP

## 2022-03-29 ENCOUNTER — TELEPHONE (OUTPATIENT)
Dept: NEUROLOGY | Age: 82
End: 2022-03-29

## 2022-03-29 NOTE — TELEPHONE ENCOUNTER
The patients new medication ZYPREXA Doesn't seen to be helping her, her hallucinations are following her  . Her son wanted to know if there is anything else that she can take.   Please call him at [phone]

## 2022-04-06 ENCOUNTER — VIRTUAL VISIT (OUTPATIENT)
Dept: NEUROLOGY | Age: 82
End: 2022-04-06
Payer: MEDICARE

## 2022-04-06 ENCOUNTER — TELEPHONE (OUTPATIENT)
Dept: NEUROLOGY | Age: 82
End: 2022-04-06

## 2022-04-06 DIAGNOSIS — F51.02 INSOMNIA DUE TO PSYCHOLOGICAL STRESS: ICD-10-CM

## 2022-04-06 DIAGNOSIS — F41.1 ANXIETY, GENERALIZED: ICD-10-CM

## 2022-04-06 DIAGNOSIS — R41.0 CONFUSION: ICD-10-CM

## 2022-04-06 DIAGNOSIS — F22 DELUSIONS (HCC): ICD-10-CM

## 2022-04-06 DIAGNOSIS — R44.3 HALLUCINATIONS: ICD-10-CM

## 2022-04-06 DIAGNOSIS — R41.3 MEMORY LOSS, SHORT TERM: Primary | ICD-10-CM

## 2022-04-06 PROCEDURE — 1090F PRES/ABSN URINE INCON ASSESS: CPT | Performed by: NURSE PRACTITIONER

## 2022-04-06 PROCEDURE — G8432 DEP SCR NOT DOC, RNG: HCPCS | Performed by: NURSE PRACTITIONER

## 2022-04-06 PROCEDURE — G8427 DOCREV CUR MEDS BY ELIG CLIN: HCPCS | Performed by: NURSE PRACTITIONER

## 2022-04-06 PROCEDURE — 1101F PT FALLS ASSESS-DOCD LE1/YR: CPT | Performed by: NURSE PRACTITIONER

## 2022-04-06 PROCEDURE — G8400 PT W/DXA NO RESULTS DOC: HCPCS | Performed by: NURSE PRACTITIONER

## 2022-04-06 PROCEDURE — 99214 OFFICE O/P EST MOD 30 MIN: CPT | Performed by: NURSE PRACTITIONER

## 2022-04-06 PROCEDURE — G8756 NO BP MEASURE DOC: HCPCS | Performed by: NURSE PRACTITIONER

## 2022-04-06 RX ORDER — SERTRALINE HYDROCHLORIDE 50 MG/1
50 TABLET, FILM COATED ORAL DAILY
Qty: 90 TABLET | Refills: 3 | Status: ON HOLD | OUTPATIENT
Start: 2022-04-06 | End: 2022-04-18 | Stop reason: SDUPTHER

## 2022-04-06 NOTE — PROGRESS NOTES
Jakob Martinez is a 79 y.o. female who was seen by synchronous (real-time) audio-video technology on 4/6/2022 for Mental Health Problem    Assessment & Plan:   Diagnoses and all orders for this visit:    1. Memory loss, short term    2. Hallucinations    3. Delusions ([address]    4. Insomnia due to psychological stress    5. Confusion    6. Anxiety, generalized  -     sertraline (ZOLOFT) 50 mg tablet; Take 1 Tablet by mouth daily. Memory loss with psychosis. Zyprexa has helped with the sleeping issues but has not helped with the delusions, hallucinations, or anxiety. Currently, she is being weaned off of her famotidine due to potential side effect and we will have to wait and see if there is any improvement in the hallucinations. In the meantime, her anxiety seems worse. Suggested we keep the Zyprexa in place but add Zoloft to help manage the generalized anxiety. Purpose and potential side effects were discussed. I will plan to see how she is doing on this in about four weeks. Hopefully by that time we will have been able to move her neurocognitive assessment up from November. Subjective: Follow up for for possible dementia with delusions and hallucinations. At her last visit, she was started on a low dose Zyprexa. Her son contacted the office indicating that she was not any better. They were instructed to increase the Zyprexa to 5mg nightly. Since increasing the Zyprexa to 5mg in combination with the Vistaril, she is sleeping better. Daughter indicates that the delusions and hallucinations are worse. Her anxiety is not any better either. She is not taking the Klonopin anymore at this point. She is weaning off of the Famotidine because her son read that this might cause hallucinations. She was started on this about a year ago. Neuropsychological testing is still pending. Current Outpatient Medications   Medication Sig    sertraline (ZOLOFT) 50 mg tablet Take 1 Tablet by mouth daily.     OLANZapine (ZyPREXA) 2.5 mg tablet Take 1 Tablet by mouth nightly.  hydrOXYzine HCL (ATARAX) 10 mg tablet Take 10 mg by mouth nightly.  sucralfate (CARAFATE) 1 gram tablet TAKE 1 TABLET BY MOUTH FOUR TIMES A DAY FOR STOMACH ULCER    acetaminophen (TYLENOL) 500 mg tablet Take 500 mg by mouth every six (6) hours as needed for Pain.  atorvastatin (LIPITOR) 20 mg tablet Take 20 mg by mouth daily.  lisinopril (PRINIVIL, ZESTRIL) 10 mg tablet Take 10 mg by mouth daily.  furosemide (LASIX) 40 mg tablet Take  by mouth daily as needed.  multivitamin with iron tablet Take 1 Tab by mouth daily.  famotidine (PEPCID) 40 mg tablet Take 40 mg by mouth two (2) times a day. No current facility-administered medications for this visit. ROS    Objective:   No flowsheet data found.      [INSTRUCTIONS:  \"[x]\" Indicates a positive item  \"[]\" Indicates a negative item  -- DELETE ALL ITEMS NOT EXAMINED]    Constitutional: [x] Appears well-developed and well-nourished [x] No apparent distress      [] Abnormal -     Mental status: [x] Alert and awake  [x] Oriented to person/place/time [x] Able to follow commands    [] Abnormal -     Eyes:   EOM    [x]  Normal    [] Abnormal -   Sclera  [x]  Normal    [] Abnormal -          Discharge [x]  None visible   [] Abnormal -     HENT: [x] Normocephalic, atraumatic  [] Abnormal -   [x] Mouth/Throat: Mucous membranes are moist    External Ears [x] Normal  [] Abnormal -    Neck: [x] No visualized mass [] Abnormal -     Pulmonary/Chest: [x] Respiratory effort normal   [x] No visualized signs of difficulty breathing or respiratory distress        [] Abnormal -      Musculoskeletal:   [x] Normal gait with no signs of ataxia         [x] Normal range of motion of neck        [] Abnormal -     Neurological:        [x] No Facial Asymmetry (Cranial nerve 7 motor function) (limited exam due to video visit)          [x] No gaze palsy        [] Abnormal -          Skin:        [x] No significant exanthematous lesions or discoloration noted on facial skin         [] Abnormal -            Psychiatric:       [x] Normal Affect [] Abnormal -        [x] No Hallucinations    Other pertinent observable physical exam findings:-        We discussed the expected course, resolution and complications of the diagnosis(es) in detail. Medication risks, benefits, costs, interactions, and alternatives were discussed as indicated. I advised her to contact the office if her condition worsens, changes or fails to improve as anticipated. She expressed understanding with the diagnosis(es) and plan. Glen Mcmahanradha, was evaluated through a synchronous (real-time) audio-video encounter. The patient (or guardian if applicable) is aware that this is a billable service, which includes applicable co-pays. Verbal consent to proceed has been obtained. The visit was conducted pursuant to the emergency declaration under the [address] waiver authority and the [employer] General Act. Patient identification was verified, and a caregiver was present when appropriate. The patient was located at home in a state where the provider was licensed to provide care.       Mendez Solo NP

## 2022-04-11 ENCOUNTER — HOSPITAL ENCOUNTER (INPATIENT)
Age: 82
LOS: 7 days | Discharge: HOME | DRG: 885 | End: 2022-04-18
Attending: EMERGENCY MEDICINE | Admitting: PSYCHIATRY & NEUROLOGY
Payer: MEDICARE

## 2022-04-11 DIAGNOSIS — F22 PARANOIA (HCC): ICD-10-CM

## 2022-04-11 DIAGNOSIS — F32.9 MAJOR DEPRESSIVE DISORDER, REMISSION STATUS UNSPECIFIED, UNSPECIFIED WHETHER RECURRENT: Primary | ICD-10-CM

## 2022-04-11 DIAGNOSIS — F41.1 ANXIETY, GENERALIZED: ICD-10-CM

## 2022-04-11 PROBLEM — F39 UNSPECIFIED MOOD (AFFECTIVE) DISORDER (HCC): Status: ACTIVE | Noted: 2022-04-11

## 2022-04-11 LAB
ALBUMIN SERPL-MCNC: 3.4 G/DL (ref 3.5–5)
ALBUMIN/GLOB SERPL: 1 {RATIO} (ref 1.1–2.2)
ALP SERPL-CCNC: 176 U/L (ref 45–117)
ALT SERPL-CCNC: 23 U/L (ref 12–78)
AMPHET UR QL SCN: NEGATIVE
ANION GAP SERPL CALC-SCNC: 4 MMOL/L (ref 5–15)
APPEARANCE UR: CLEAR
AST SERPL-CCNC: 21 U/L (ref 15–37)
BACTERIA URNS QL MICRO: NEGATIVE /HPF
BARBITURATES UR QL SCN: NEGATIVE
BASOPHILS # BLD: 0 K/UL (ref 0–0.1)
BASOPHILS NFR BLD: 1 % (ref 0–1)
BENZODIAZ UR QL: NEGATIVE
BILIRUB SERPL-MCNC: 1.1 MG/DL (ref 0.2–1)
BILIRUB UR QL: NEGATIVE
BUN SERPL-MCNC: 24 MG/DL (ref 6–20)
BUN/CREAT SERPL: 28 (ref 12–20)
CALCIUM SERPL-MCNC: 9.2 MG/DL (ref 8.5–10.1)
CANNABINOIDS UR QL SCN: NEGATIVE
CHLORIDE SERPL-SCNC: 108 MMOL/L (ref 97–108)
CO2 SERPL-SCNC: 27 MMOL/L (ref 21–32)
COCAINE UR QL SCN: NEGATIVE
COLOR UR: ABNORMAL
COMMENT, HOLDF: NORMAL
CREAT SERPL-MCNC: 0.86 MG/DL (ref 0.55–1.02)
DIFFERENTIAL METHOD BLD: ABNORMAL
DRUG SCRN COMMENT,DRGCM: NORMAL
EOSINOPHIL # BLD: 0.1 K/UL (ref 0–0.4)
EOSINOPHIL NFR BLD: 1 % (ref 0–7)
EPITH CASTS URNS QL MICRO: ABNORMAL /LPF
ERYTHROCYTE [DISTWIDTH] IN BLOOD BY AUTOMATED COUNT: 13 % (ref 11.5–14.5)
FLUAV RNA SPEC QL NAA+PROBE: NOT DETECTED
FLUBV RNA SPEC QL NAA+PROBE: NOT DETECTED
GLOBULIN SER CALC-MCNC: 3.4 G/DL (ref 2–4)
GLUCOSE SERPL-MCNC: 120 MG/DL (ref 65–100)
GLUCOSE UR STRIP.AUTO-MCNC: NEGATIVE MG/DL
HCT VFR BLD AUTO: 32.9 % (ref 35–47)
HGB BLD-MCNC: 10.7 G/DL (ref 11.5–16)
HGB UR QL STRIP: ABNORMAL
HYALINE CASTS URNS QL MICRO: ABNORMAL /LPF (ref 0–5)
IMM GRANULOCYTES # BLD AUTO: 0 K/UL (ref 0–0.04)
IMM GRANULOCYTES NFR BLD AUTO: 0 % (ref 0–0.5)
KETONES UR QL STRIP.AUTO: NEGATIVE MG/DL
LEUKOCYTE ESTERASE UR QL STRIP.AUTO: ABNORMAL
LYMPHOCYTES # BLD: 1.8 K/UL (ref 0.8–3.5)
LYMPHOCYTES NFR BLD: 26 % (ref 12–49)
MCH RBC QN AUTO: 30.4 PG (ref 26–34)
MCHC RBC AUTO-ENTMCNC: 32.5 G/DL (ref 30–36.5)
MCV RBC AUTO: 93.5 FL (ref 80–99)
METHADONE UR QL: NEGATIVE
MONOCYTES # BLD: 0.6 K/UL (ref 0–1)
MONOCYTES NFR BLD: 9 % (ref 5–13)
NEUTS SEG # BLD: 4.3 K/UL (ref 1.8–8)
NEUTS SEG NFR BLD: 63 % (ref 32–75)
NITRITE UR QL STRIP.AUTO: NEGATIVE
NRBC # BLD: 0 K/UL (ref 0–0.01)
NRBC BLD-RTO: 0 PER 100 WBC
OPIATES UR QL: NEGATIVE
PCP UR QL: NEGATIVE
PH UR STRIP: 5.5 [PH] (ref 5–8)
PLATELET # BLD AUTO: 270 K/UL (ref 150–400)
PMV BLD AUTO: 9.3 FL (ref 8.9–12.9)
POTASSIUM SERPL-SCNC: 3.6 MMOL/L (ref 3.5–5.1)
PROT SERPL-MCNC: 6.8 G/DL (ref 6.4–8.2)
PROT UR STRIP-MCNC: NEGATIVE MG/DL
RBC # BLD AUTO: 3.52 M/UL (ref 3.8–5.2)
RBC #/AREA URNS HPF: ABNORMAL /HPF (ref 0–5)
SAMPLES BEING HELD,HOLD: NORMAL
SARS-COV-2, COV2: NOT DETECTED
SODIUM SERPL-SCNC: 139 MMOL/L (ref 136–145)
SP GR UR REFRACTOMETRY: 1.01 (ref 1–1.03)
UR CULT HOLD, URHOLD: NORMAL
UROBILINOGEN UR QL STRIP.AUTO: 0.2 EU/DL (ref 0.2–1)
WBC # BLD AUTO: 6.8 K/UL (ref 3.6–11)
WBC URNS QL MICRO: ABNORMAL /HPF (ref 0–4)

## 2022-04-11 PROCEDURE — 80053 COMPREHEN METABOLIC PANEL: CPT

## 2022-04-11 PROCEDURE — 87636 SARSCOV2 & INF A&B AMP PRB: CPT

## 2022-04-11 PROCEDURE — 99285 EMERGENCY DEPT VISIT HI MDM: CPT

## 2022-04-11 PROCEDURE — 80307 DRUG TEST PRSMV CHEM ANLYZR: CPT

## 2022-04-11 PROCEDURE — 81001 URINALYSIS AUTO W/SCOPE: CPT

## 2022-04-11 PROCEDURE — 65220000003 HC RM SEMIPRIVATE PSYCH

## 2022-04-11 PROCEDURE — 36415 COLL VENOUS BLD VENIPUNCTURE: CPT

## 2022-04-11 PROCEDURE — 74011250637 HC RX REV CODE- 250/637: Performed by: NURSE PRACTITIONER

## 2022-04-11 PROCEDURE — 85025 COMPLETE CBC W/AUTO DIFF WBC: CPT

## 2022-04-11 RX ORDER — DIPHENHYDRAMINE HYDROCHLORIDE 50 MG/ML
25 INJECTION, SOLUTION INTRAMUSCULAR; INTRAVENOUS
Status: DISCONTINUED | OUTPATIENT
Start: 2022-04-11 | End: 2022-04-18 | Stop reason: HOSPADM

## 2022-04-11 RX ORDER — ADHESIVE BANDAGE
30 BANDAGE TOPICAL DAILY PRN
Status: DISCONTINUED | OUTPATIENT
Start: 2022-04-11 | End: 2022-04-18 | Stop reason: HOSPADM

## 2022-04-11 RX ORDER — HALOPERIDOL 5 MG/ML
2.5 INJECTION INTRAMUSCULAR
Status: DISCONTINUED | OUTPATIENT
Start: 2022-04-11 | End: 2022-04-18 | Stop reason: HOSPADM

## 2022-04-11 RX ORDER — ACETAMINOPHEN 325 MG/1
650 TABLET ORAL
Status: DISCONTINUED | OUTPATIENT
Start: 2022-04-11 | End: 2022-04-18 | Stop reason: HOSPADM

## 2022-04-11 RX ORDER — OLANZAPINE 5 MG/1
5 TABLET ORAL
Status: COMPLETED | OUTPATIENT
Start: 2022-04-11 | End: 2022-04-11

## 2022-04-11 RX ORDER — BENZTROPINE MESYLATE 1 MG/1
0.5 TABLET ORAL
Status: DISCONTINUED | OUTPATIENT
Start: 2022-04-11 | End: 2022-04-18 | Stop reason: HOSPADM

## 2022-04-11 RX ORDER — HYDROXYZINE HYDROCHLORIDE 10 MG/1
10 TABLET, FILM COATED ORAL
Status: COMPLETED | OUTPATIENT
Start: 2022-04-11 | End: 2022-04-11

## 2022-04-11 RX ORDER — OLANZAPINE 2.5 MG/1
2.5 TABLET ORAL
Status: DISCONTINUED | OUTPATIENT
Start: 2022-04-11 | End: 2022-04-18 | Stop reason: HOSPADM

## 2022-04-11 RX ORDER — IBUPROFEN 200 MG
1 TABLET ORAL AS NEEDED
Status: DISCONTINUED | OUTPATIENT
Start: 2022-04-11 | End: 2022-04-12

## 2022-04-11 RX ADMIN — HYDROXYZINE HYDROCHLORIDE 10 MG: 10 TABLET ORAL at 22:52

## 2022-04-11 RX ADMIN — OLANZAPINE 5 MG: 5 TABLET, FILM COATED ORAL at 22:52

## 2022-04-11 NOTE — ED PROVIDER NOTES
Maury Wood is an 78 yo F with h/o HTN, Hypercholesterolemia, GERD and diverticulosis who is brought to the ED by her son due to concerns for paranoia and cognitive changes. She has been evaluated at [facility] and [facility] [place] for similar symptoms. She saw neurology but is not scheduled to have a neurocognitive assessment until September. Her son states that she is not save at home and is calling police frequently. He has discussed his concernes with [facility] who referred her to the ED here and discussed with BSMART.             Past Medical History:   Diagnosis Date    Chronic pain     GERD (gastroesophageal reflux disease)     Hypercholesteremia     Hypertension     Ill-defined condition     DJD    Ill-defined condition     Diverticulosis       Past Surgical History:   Procedure Laterality Date    HX DILATION AND CURETTAGE      HX HEENT  2001    Oral surgery - gum disease    HX ORTHOPAEDIC Right 2006    Trigger finger repair    UPPER GI ENDOSCOPY,BIOPSY  9/27/2021              Family History:   Problem Relation Age of Onset    Stroke Mother     Heart Attack Father         age [de-identified]       Social History     Socioeconomic History    Marital status:      Spouse name: Not on file    Number of children: Not on file    Years of education: Not on file    Highest education level: Not on file   Occupational History    Not on file   Tobacco Use    Smoking status: Never Smoker    Smokeless tobacco: Never Used   Vaping Use    Vaping Use: Never used   Substance and Sexual Activity    Alcohol use: No    Drug use: No    Sexual activity: Not on file   Other Topics Concern     Service Not Asked    Blood Transfusions Not Asked    Caffeine Concern Not Asked    Occupational Exposure Not Asked    Hobby Hazards Not Asked    Sleep Concern Not Asked    Stress Concern Not Asked    Weight Concern Not Asked    Special Diet Not Asked    Back Care Not Asked    Exercise Not Asked    Bike Helmet Not Asked   [address] [facility] Not Asked    Self-Exams Not Asked   Social History Narrative    Not on file     Social Determinants of Health     Financial Resource Strain:     Difficulty of Paying Living Expenses: Not on file   Food Insecurity:     Worried About Running Out of Food in the Last Year: Not on file    Nathan of Food in the Last Year: Not on file   Transportation Needs:     Lack of Transportation (Medical): Not on file    Lack of Transportation (Non-Medical): Not on file   Physical Activity:     Days of Exercise per Week: Not on file    Minutes of Exercise per Session: Not on file   Stress:     Feeling of Stress : Not on file   Social Connections:     Frequency of Communication with Friends and Family: Not on file    Frequency of Social Gatherings with Friends and Family: Not on file    Attends Orthodox Services: Not on file    Active Member of [address] [place] [address] [employer] or Organizations: Not on file    Attends Club or Organization Meetings: Not on file    Marital Status: Not on file   Intimate Partner Violence:     Fear of Current or Ex-Partner: Not on file    Emotionally Abused: Not on file    Physically Abused: Not on file    Sexually Abused: Not on file   Housing Stability:     Unable to Pay for Housing in the Last Year: Not on file    Number of [place] in the Last Year: Not on file    Unstable Housing in the Last Year: Not on file         ALLERGIES: Aspirin, Cephalexin, and Penicillins    Review of Systems   Constitutional: Negative for fever. HENT: Negative for sore throat. Eyes: Negative for visual disturbance. Respiratory: Negative for cough. Cardiovascular: Negative for chest pain. Gastrointestinal: Negative for abdominal pain. Genitourinary: Negative for dysuria. Musculoskeletal: Negative for back pain. Skin: Negative for rash. Neurological: Negative for headaches. Psychiatric/Behavioral: Positive for confusion and hallucinations. Negative for suicidal ideas. Vitals:    04/11/22 1234   BP: 133/71   Pulse: 93   Resp: 16   Temp: 97 °F (36.1 °C)   SpO2: 95%            Physical Exam  Vitals and nursing note reviewed. Constitutional:       General: She is not in acute distress. Appearance: She is well-developed. HENT:      Head: Normocephalic and atraumatic. Eyes:      Conjunctiva/sclera: Conjunctivae normal.   Neck:      Trachea: Phonation normal.   Cardiovascular:      Rate and Rhythm: Normal rate. Pulmonary:      Effort: Pulmonary effort is normal. No respiratory distress. Abdominal:      General: There is no distension. Musculoskeletal:         General: No tenderness. Normal range of motion. Cervical back: Normal range of motion. Skin:     General: Skin is warm and dry. Neurological:      Mental Status: She is alert. She is not disoriented. Motor: No abnormal muscle tone. Psychiatric:         Speech: Speech normal.         Thought Content: Thought content is paranoid and delusional. Thought content does not include homicidal or suicidal ideation. MDM       1:47 PM  Patient revaluated by [facility] [place] and Psychiatry will admit if medically cleared. 4:25 PM  PAtient medically cleared for psychiatric admission.      Procedures

## 2022-04-11 NOTE — ED NOTES
TRANSFER - OUT REPORT:    Verbal report given to Xiomara(name) on Tanya Perez  being transferred to 7W(unit) for routine progression of care       Report consisted of patients Situation, Background, Assessment and   Recommendations(SBAR). Information from the following report(s) SBAR, Kardex and ED Summary was reviewed with the receiving nurse. Lines:       Opportunity for questions and clarification was provided.       Patient transported with:   Registered Nurse and officer

## 2022-04-11 NOTE — BSMART NOTE
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 The Medical Doctor/Krishna is in agreement with Psychiatrist disposition because of (reason) pt meeting admission criteria. The plan is medically clear for voluntary admission. The on-call Psychiatrist consulted was Dr. Payton. The admitting Psychiatrist will be Dr. Annette Claude. The admitting Diagnosis is Unspecified Mood Disorder with psychosis. The Payor source is Medicare. Based on the [place] Suicide Severity Risk Level  Scale there is LOW risk for suicide. Based on this assessment the overall risk of suicide is LOW. The plan will be medically clear and present for admission. Section II - Integrated Summary  Summary:  Per triage, \"Pt stated she is here to have her medications checked , son stated she is here for bsmart for anxiety and depression , denies SI or HI. \"    Pt is an 79year old female brought to ER by her son, Sridhar Lara who is a . Pt presents as sad but was cooperative during assessment. Pt walks with a cane but per her son she can get ambulate without it. Pt denied SI/HI. Pt has no hx of aggression. Pt reported to have been experiencing delusions that her neighbors are stealing her electricity and other things from her home. Pt also is seeing things like drones which are scaring her to be in her home at night. Pt shared these things that are happening mostly occur at night but recently now have moved into the daytime.  Pt was handling by calling 911 but after so many calls they have had that call re-routed to pt's son's contact number. Pt per son began declining last summer with things worsening in the Fall. At that time, the son shared that bc holidays were coming up and pt loves holidays so they decided to wait to begin plan of action. Thus in January pt's children (x3 adults) began making neurology appointments to explore pt's hallucinations, anxiety and delusions. Pt reported to have no psychiatric hx. Pt was seen twice by Bronwyn Calix NP (neurology) where she was started on Zoloft last week. Bronwyn Calix NP dx pt with short term memory loss, hallucinations, delusions, insomnia bc of psychological stress, confusion and JONELLE. To date no MRI has shown dementia but further testing to be done 9/2022 at there next appointment. Pt also started by NP on Zyprexa. However, the son shared getting their mother to to take her medications has been difficult. Pt's son shared pt has been hoarding over the last several years to the extent that they were unable to have Lakshmi dinner on the dining room table and she has also been exhibiting increased \"worry\" (e.g. son called mother accidentally without knowing it and mother heard him talking and feared he had been kidnapped so she called DIL) the last several years. Pt reported not to be sleeping bc she is to engrossed in finding out who is trying to break into her home or steal her electricity in spite of son setting hunting cameras and discovering nothing. Pt's daughter since January has been staying with mother pretty much entire time bc of her injured back but recently she returned to work resulting in pt having increased symptoms. Pt and children have also begun the process of having pt placed at assisted living facility and pt reportedly has not been happy about that. Pt seems to be presenting with some depression along with anxiety, delusions, insomnia and hallucinations. This writer spoke with Dr. Rigoberto Vasquez who shared pt would meet inpatient admission voluntary criteria upon medical clearance.  Pt is amenable to voluntary admission. The patienthas demonstrated mental capacity to provide informed consent. The information is given by the patient, chart and relative(s). The Chief Complaint is hallucinations, delusions and anxiety. The Precipitant Factors are unknown. Previous Hospitalizations: denied  The patient has not previously been in restraints. Current Psychiatrist and/or  is no one. Lethality Assessment:    The potential for suicide is not noted. The potential for homicide is not noted. The patient has not been a perpetrator of sexual or physical abuse. There are not pending charges. The patient is not felt to be at risk for self harm or harm to others. The attending nurse was advised that security has not been notified. Section III - Psychosocial  The patient's overall mood and attitude is sad and anxious. Feelings of helplessness and hopelessness are not observed. Generalized anxiety is observed by pt report. Panic is not observed. Phobias are not observed. Obsessive compulsive tendencies are not observed. Section IV - Mental Status Exam  The patient's appearance shows no evidence of impairment. The patient's behavior shows no evidence of impairment. The patient is oriented to time, place, person and situation. The patient's speech shows no evidence of impairment. The patient's mood is sad. The range of affect is flat. The patient's thought content demonstrates delusions. The thought process shows no evidence of impairment. The patient's perception demonstrated changes in the following:  auditory  visual hallucinations. The patient's memory is impaired (per neurology report). The patient's appetite shows no evidence of impairment. The patient's sleep has evidence of insomnia. The patient's insight is blaming. The patient's judgement is psychologically impaired. Section V - Substance Abuse  The patient is not using substances. Section VI - Living Arrangements  The patient is . The patient lives alone. The patient has x3 adult children ages. The patient does plan to return home upon discharge. The patient does not have legal issues pending. The patient's source of income comes from social security. Catholic and cultural practices have not been voiced at this time. The patient's greatest support comes from family and this person will be involved with the treatment. The patient has not been in an event described as horrible or outside the realm of ordinary life experience either currently or in the past.  The patient has not been a victim of sexual/physical abuse. Section VII - Other Areas of Clinical Concern  The highest grade achieved is HS diploma with the overall quality of school experience being described as unknown. The patient is currently retired and speaks [place] as a primary language. The patient has no communication impairments affecting communication. The patient's preference for learning can be described as: can read and write adequately.   The patient's hearing is normal.  The patient's vision is normal.      Katelyn Rivera MS, Licensed Resident in [place]

## 2022-04-11 NOTE — ED TRIAGE NOTES
Pt stated she is here to have her medications checked , son stated she is here for bsmart for anxiety and depression , denies SI or HI

## 2022-04-11 NOTE — SENIOR SERVICES NOTE
SSED consult received and appreciated. Chart Reviewed. Patient greeted in room, able to state name, , location and current month/ year. Son, The woodlands Guess present at the bedside. I introduced myself and role as SSED NP, patient receptive and willing to participate in the geriatric screenings. States that she has been using her cane but has had a couple falls in previous months, none recent. Delirium: Negative, able to spell LUNCH backward with no difficulty. Mini-Co5, it took the patient a couple minutes for the clock, however when asked to put the hands on the clock she adjusted the areas where she placed the numbers too close together. She was able to recall all 3 words without difficulty. Depression: Deferred for BSmart evaluation, however patient denies current SI/HI. ADL's: Independent  IADL's: Increased need over the last 7 months by local 3 children. Requiring assistance with paying bills, meal support, maintenance of home and yard. Home:   -Lives alone, spouse passed away 10 years ago.   -House, 1 story, 3-4 steps to get in initially, ranch style home.   -Someone assists with yard work    3 children live nearby within 15 min. , sees and speaks to frequently. Patient still driving, short distances. Patient able to manage pills/ pillbox independently. Medication reconciliation performed and updated with patient and son The woodlands at the bedside. Patient states that she has had difficulty with a \"bad neighbor\", wondering in the yard at night time, in her house when she isn't there. She states that she thinks there is also a female that is stealing her silverware and make up. She voices that she is unsure of why her children do not believe her. Patient is soft spoken, well dressed, will make eye contact throughout conversation. The nash (is a ) states that he has had police officers investigate the ideations, however nothing has been found.  Patient also states that she saw a child's foot prints through the home like they had walked through water. ADT has been in place for 30 years per son Mariel Ortiz, box recently changed. Mariel Ortiz also states that he has had new locks put on the front and back door, and installed game cameras. Delusional thinking has been progressing in severity over the last 7 months per son, Mariel Ortiz at the bedside. Medication changes, increased changes over the last couple weeks, Zyprexa, Hydroxyzine and Zoloft. We discussed possible side effects from famotidine. Mariel Ortiz states that he and his siblings have started interviewing and touring local Assisted Living Facilities at the below locations:  [address]  However patient is not open nor receptive to moving. She states that she doesn't want to go to a nursing home to die. I attempted to explain the difference, however patient appeared unreceptive at this time. Current Neurologist- Dr. Malik Hodges, seen and evaluated. No formal diagnosis of Dementia or Alzheimer's. Mariel Ortiz states that they have had some testing but are waiting for the Neuropsychiatric appointment in September. Dr Naheed Klein- Neuropsychiatrist in September 22'. Patient pending final ED evaluation. However after I collaborated with Nicki Mccurdy counselor, she states that Dr. Jose Camacho has agreed for psychiatric admission pending final ED evaluation. Patient has a very supportive family, all physical needs are being met. I have collaborated with Erick Mobley CM to attempt reaching out to Dr. Patrizia Kumar office to see about a sooner appointment. However, I strongly feel that the patients current delusions / hallucinations are not from Dementia/ Alzheimer's. Medication Reconciliation complete, patient pending psych admission. Thank you for the opportunity to participate in this patient care.    Valery Canchola NP  Senior Services ED [place]  4:51 PM  [phone]

## 2022-04-11 NOTE — BH NOTES
TRANSFER - IN REPORT:    Verbal report received from [facility], RN (name) on Geraldo Landrum  being received from [facility] [place] ED (unit) for routine progression of care      Report consisted of patients Situation, Background, Assessment and   Recommendations(SBAR). Information from the following report(s) SBAR, ED Summary and Recent Results was reviewed with the receiving nurse. Opportunity for questions and clarification was provided. Assessment completed upon patients arrival to unit and care assumed.

## 2022-04-11 NOTE — PROGRESS NOTES
SSED/CM consult received and appreciated. EMR reviewed. Case discussed w/ Ambika Clark NP. Noted consult for [facility] and informed of plan. Patient has appointment w/ Dr Ellard Lefort on 9/29/22 This writer asked to contact office for sooner appointment. Call placed to [facility] no availability at this time. Requested call back mid May for [address] to check availability for new Practitioner  at site. 1700 Noted patient for [address] Admission . Call placed to francois Hunter [phone] informed of above and need to contact Neurology Center in mid May. Son in agreement and will f/u.

## 2022-04-12 PROCEDURE — 74011250637 HC RX REV CODE- 250/637: Performed by: NURSE PRACTITIONER

## 2022-04-12 PROCEDURE — 65220000003 HC RM SEMIPRIVATE PSYCH

## 2022-04-12 PROCEDURE — 74011250637 HC RX REV CODE- 250/637: Performed by: PSYCHIATRY & NEUROLOGY

## 2022-04-12 RX ORDER — DICLOFENAC SODIUM 10 MG/G
4 GEL TOPICAL EVERY 6 HOURS
Status: DISCONTINUED | OUTPATIENT
Start: 2022-04-12 | End: 2022-04-18 | Stop reason: HOSPADM

## 2022-04-12 RX ORDER — RISPERIDONE 0.5 MG/1
0.5 TABLET, FILM COATED ORAL 2 TIMES DAILY
Status: DISCONTINUED | OUTPATIENT
Start: 2022-04-12 | End: 2022-04-18 | Stop reason: HOSPADM

## 2022-04-12 RX ORDER — SERTRALINE HYDROCHLORIDE 50 MG/1
50 TABLET, FILM COATED ORAL DAILY
Status: DISCONTINUED | OUTPATIENT
Start: 2022-04-12 | End: 2022-04-18 | Stop reason: HOSPADM

## 2022-04-12 RX ORDER — AMMONIUM LACTATE 12 G/100G
LOTION TOPICAL 2 TIMES DAILY
Status: DISCONTINUED | OUTPATIENT
Start: 2022-04-12 | End: 2022-04-18 | Stop reason: HOSPADM

## 2022-04-12 RX ADMIN — DICLOFENAC SODIUM 4 G: 10 GEL TOPICAL at 10:11

## 2022-04-12 RX ADMIN — RISPERIDONE 0.5 MG: 1 TABLET, FILM COATED ORAL at 21:05

## 2022-04-12 RX ADMIN — RISPERIDONE 0.5 MG: 1 TABLET, FILM COATED ORAL at 12:20

## 2022-04-12 RX ADMIN — Medication: at 18:00

## 2022-04-12 RX ADMIN — DICLOFENAC SODIUM 4 G: 10 GEL TOPICAL at 16:42

## 2022-04-12 RX ADMIN — ACETAMINOPHEN 650 MG: 325 TABLET ORAL at 16:42

## 2022-04-12 RX ADMIN — SERTRALINE 50 MG: 50 TABLET, FILM COATED ORAL at 12:20

## 2022-04-12 NOTE — PROGRESS NOTES
Patient invited to attend Spirituality Group on 4/12/2022, but chose not to participate. Rev.  Matthew Dye MDiv, [facility], [facility]   paging service: [phone])

## 2022-04-12 NOTE — BH NOTES
Primary Nurse Selwyn David RN and Brea Cesar RN performed a dual skin assessment on this patient No impairment noted  Zach score is 23    Pt is calm and cooperative during admission process. Pt denies si/hi. Pt denies ah/vh at this time. Pt states \" Well my son/family tells me I see or do things that I shouldn't \". Denies alcohol/drug abuse.

## 2022-04-12 NOTE — PROGRESS NOTES
Problem: Altered Thought Process (Adult/Pediatric)  Goal: Interventions  Outcome: Progressing Towards Goal     Problem: Patient Education: Go to Patient Education Activity  Goal: Patient/Family Education  Outcome: Progressing Towards Goal     Problem: Falls - Risk of  Goal: *Absence of Falls  Description: Document Ambreen Forresth Fall Risk and appropriate interventions in the flowsheet. Outcome: Progressing Towards Goal  Note: Fall Risk Interventions:  Mobility Interventions: Utilize walker, cane, or other assistive device    Mentation Interventions: Bed/chair exit alarm    Medication Interventions: Teach patient to arise slowly    Elimination Interventions:  Toileting schedule/hourly rounds    History of Falls Interventions: Bed/chair exit alarm         Problem: Patient Education: Go to Patient Education Activity  Goal: Patient/Family Education  Outcome: Progressing Towards Goal

## 2022-04-12 NOTE — PROGRESS NOTES
Laboratory Monitoring for Antipsychotics: This patient is currently prescribed the following medication(s):   Current Facility-Administered Medications   Medication Dose Route Frequency    diclofenac (VOLTAREN) 1 % topical gel 4 g  4 g Topical Q6H    sertraline (ZOLOFT) tablet 50 mg  50 mg Oral DAILY    risperiDONE (RisperDAL) tablet 0.5 mg  0.5 mg Oral BID     The following labs have been completed for monitoring of antipsychotics and/or mood stabilizers:    Height, Weight, BMI Estimation  Estimated body mass index is 20.6 kg/m² as calculated from the following:    Height as of this encounter: 162.6 cm (64\"). Weight as of this encounter: 54.4 kg (120 lb). Vital Signs/Blood Pressure  Visit Vitals  /60 (BP 1 Location: Right upper arm, BP Patient Position: Sitting)   Pulse 76   Temp 98.8 °F (37.1 °C)   Resp 16   Ht 162.6 cm (64\")   Wt 54.4 kg (120 lb)   SpO2 97%   BMI 20.60 kg/m²     Renal Function, Hepatic Function and Chemistry  Estimated Creatinine Clearance: 43.3 mL/min (based on SCr of 0.86 mg/dL). Lab Results   Component Value Date/Time    Sodium 139 04/11/2022 02:27 PM    Potassium 3.6 04/11/2022 02:27 PM    Chloride 108 04/11/2022 02:27 PM    CO2 27 04/11/2022 02:27 PM    Anion gap 4 (L) 04/11/2022 02:27 PM    BUN 24 (H) 04/11/2022 02:27 PM    Creatinine 0.86 04/11/2022 02:27 PM    BUN/Creatinine ratio 28 (H) 04/11/2022 02:27 PM    Bilirubin, total 1.1 (H) 04/11/2022 02:27 PM    Protein, total 6.8 04/11/2022 02:27 PM    Albumin 3.4 (L) 04/11/2022 02:27 PM    Globulin 3.4 04/11/2022 02:27 PM    A-G Ratio 1.0 (L) 04/11/2022 02:27 PM    ALT (SGPT) 23 04/11/2022 02:27 PM    AST (SGOT) 21 04/11/2022 02:27 PM    Alk.  phosphatase 176 (H) 04/11/2022 02:27 PM     Lab Results   Component Value Date/Time    Glucose 120 (H) 04/11/2022 02:27 PM     No results found for: HBA1C, FAW7DHHA    Hematology  Lab Results   Component Value Date/Time    WBC 6.8 04/11/2022 02:27 PM    RBC 3.52 (L) 04/11/2022 02:27 PM    HGB 10.7 (L) 04/11/2022 02:27 PM    HCT 32.9 (L) 04/11/2022 02:27 PM    MCV 93.5 04/11/2022 02:27 PM    MCH 30.4 04/11/2022 02:27 PM    MCHC 32.5 04/11/2022 02:27 PM    RDW 13.0 04/11/2022 02:27 PM    PLATELET 766 94/99/8328 02:27 PM     Lipids  No results found for: CHOL, CHOLX, CHLST, CHOLV, 246463, HDL, HDLP, LDL, LDLC, DLDLP, TGLX, TRIGL, TRIGP, CHHD, CHHDX    Thyroid Function  No results found for: TSH, TSH2, TSH3, TSHP, TSHELE, TT3, T3U, T3UP, FRT3, FT3, FT4, FT4P, T4, T4P, FT4T, TT7, TSHEXT    Assessment/Plan:  Will order lipid panel and hemoglobin A1c or fasting glucose to complete the recommended baseline laboratory monitoring based on the patient's current medication regimen.         Geri Allen, DAMASOD

## 2022-04-12 NOTE — WOUND CARE
WOCN Note:     New consult for wound. Chart shows:  Admitted for cognitive changes, paranoia, depression    Assessment:   Appropriately conversational but drowsy having awakened from a nap. Continent and mobile. Dry thick plaques to lower legs with hemosiderin staining. No open wounds. Lac-Hydrin cream to lower legs twice daily    Discussed with RN. Transition of Care: Plan to follow weekly and as needed.      ADILIA KimbroughN, RN, [facility]  Certified Wound, Ostomy, Continence Nurse  office [phone]  Available via [employer]

## 2022-04-12 NOTE — PROGRESS NOTES
Problem: Falls - Risk of  Goal: *Absence of Falls  Description: Document Barb Artis Fall Risk and appropriate interventions in the flowsheet.   Outcome: Progressing Towards Goal  Note: Fall Risk Interventions:  Mobility Interventions: Communicate number of staff needed for ambulation/transfer,Utilize walker, cane, or other assistive device         Medication Interventions: Teach patient to arise slowly         History of Falls Interventions: Door open when patient unattended       Pt appears asleep in bed, NAD, even respirations, will continue to monitor q15min
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
## 2022-04-12 NOTE — INTERDISCIPLINARY ROUNDS
Behavioral Health Interdisciplinary Rounds     Patient Name: Samir Dupont  Age: 79 y.o. Room/Bed:  744/  Primary Diagnosis: <principal problem not specified>   Admission Status: Voluntary     Readmission within 30 days: no  Power of  in place: no  Patient requires a blocked bed: no          Reason for blocked bed:     VTE Prophylaxis: No    Mobility needs/Fall risk: yes  Flu Vaccine : no   Nutritional Plan: no  Consults:          Labs/Testing due today?: no    Sleep hours: 6.5     Participation in Care/Groups:  New pt  Medication Compliant?: new pt  PRNS (last 24 hours): None    Restraints (last 24 hours):  no     CIWA (range last 24 hours):     COWS (range last 24 hours):      Alcohol screening (AUDIT) completed -   AUDIT Score: 0     If applicable, date SBIRT discussed in treatment team AND documented:   AUDIT Screen Score: AUDIT Score: 0    Tobacco - patient is a smoker: Have You Used Tobacco in the Past 30 Days: No  Illegal Drugs use: Have You Used Any Illegal Substances Over the Past 12 Months: No    24 hour chart check complete: yes   ____________________________________________________________________________________________________________    Patient goal(s) for today:   Treatment team focus/goals: Plan to assess for medications and discharged. Progress note : She was pleasant and compliant with treatment team.  Discussed issues with her home and the concerns about her neighbors. SW spoke to her son. Family reports she has calling the police and calling her children due to concerns regarding her paranoia towards her neighbors. They have been concerned about her ability to care for self and have explored assisted living facilities. LOS:  1  Expected LOS: TBD     Financial concerns/prescription coverage:  Medicare   Family contact:  Son      Family requesting physician contact today:    Discharge plan: She will return home when ready for discharge needs.    Access to weapons : no       Outpatient provider(s): TBD   Patient's preferred phone number for follow up call :   Patient's preferred e-mail address :  Participating treatment team members: Arvell Harada, Tommas Hirschfeld, RN
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
## 2022-04-12 NOTE — PROGRESS NOTES
Problem: Altered Thought Process (Adult/Pediatric)  Goal: *STG: Participates in treatment plan  Outcome: Progressing Towards Goal  Note: Up in room, hesitant to engage with peers on dayroom. Requires much encouragement to come out of room. Voices anxiety and paranoid thoughts that her personal items would not be safe if she left them. When confronted with BSMART note and information from son, pt deflects, rationalizes her behaviors/reaction and demonstrates poor insight. Noted having difficulty tossing out items that are used into trash can. Noted paranoid thinking that neighbors and strangers are committing illegal activities. Per chart history and son's statements these events are unfounded. Insight into her impaired memory and describes feeling forgetful, states its related to her anxiety level. Denies SI, no self harming behaviors. Daily goal is to stay active and talk with family. Staff focus is on offering support and reassurance.    Goal: *STG: Complies with medication therapy  Outcome: Progressing Towards Goal  Goal: *STG: Demonstrates ability to understand and use improved judgment in daily activities and relationships  Outcome: Progressing Towards Goal  Goal: Interventions  Outcome: Miko Willis  Master Treatment Plan for Georgann Shone Guess    Date Treatment Plan Initiated: 4/12/22    Treatment Plan Modalities:  Type of Modality Amount  (x minutes) Frequency (x/week) Duration (x days) Name of Responsible Staff   Community & wrap-up meetings to encourage peer interactions 15 7 1 [facility] [address] psychotherapy to assist in building coping skills and internal controls 60 7 1 Ambika Garces   Therapeutic activity groups to build coping skills 60 7 1 Ambika Garces   Psychoeducation in group setting to address:   Medication education   15 7 1 Devi ReynosoD   Coping skills   30 3 1 Ambika Garces   Relaxation techniques         Symptom management Discharge planning   60 2 1 Ambika Garces   Spirituality    60 2 1 Chaplain RESTREPO   61 1 1 volunteer   Recovery/AA/NA      volunteer   Physician medication management   15 7 1 Dr. Cherry Renae   Family meeting/discharge planning   15 2 [address] [facility] and Ambika Sorensen

## 2022-04-12 NOTE — PROGRESS NOTES
Problem: Falls - Risk of  Goal: *Absence of Falls  Description: Document Kadni Michaels Fall Risk and appropriate interventions in the flowsheet. Outcome: Progressing Towards Goal  Note: Fall Risk Interventions:  Mobility Interventions: Utilize walker, cane, or other assistive device    Mentation Interventions: Bed/chair exit alarm    Medication Interventions: Teach patient to arise slowly    Elimination Interventions:  Toileting schedule/hourly rounds    History of Falls Interventions: Bed/chair exit alarm       Pt is visible on unit  No voiced complaints or acute distress at present  Noted mild ams at present

## 2022-04-13 LAB
CHOLEST SERPL-MCNC: 159 MG/DL
GLUCOSE P FAST SERPL-MCNC: 99 MG/DL (ref 65–100)
HDLC SERPL-MCNC: 59 MG/DL
HDLC SERPL: 2.7 {RATIO} (ref 0–5)
LDLC SERPL CALC-MCNC: 83.8 MG/DL (ref 0–100)
TRIGL SERPL-MCNC: 81 MG/DL (ref ?–150)
TSH SERPL DL<=0.05 MIU/L-ACNC: 3.13 UIU/ML (ref 0.36–3.74)
VLDLC SERPL CALC-MCNC: 16.2 MG/DL

## 2022-04-13 PROCEDURE — 97116 GAIT TRAINING THERAPY: CPT | Performed by: PHYSICAL THERAPIST

## 2022-04-13 PROCEDURE — 74011250637 HC RX REV CODE- 250/637: Performed by: PSYCHIATRY & NEUROLOGY

## 2022-04-13 PROCEDURE — 36415 COLL VENOUS BLD VENIPUNCTURE: CPT

## 2022-04-13 PROCEDURE — 84443 ASSAY THYROID STIM HORMONE: CPT

## 2022-04-13 PROCEDURE — 65220000003 HC RM SEMIPRIVATE PSYCH

## 2022-04-13 PROCEDURE — 80061 LIPID PANEL: CPT

## 2022-04-13 PROCEDURE — 97161 PT EVAL LOW COMPLEX 20 MIN: CPT | Performed by: PHYSICAL THERAPIST

## 2022-04-13 PROCEDURE — 82947 ASSAY GLUCOSE BLOOD QUANT: CPT

## 2022-04-13 RX ORDER — FUROSEMIDE 40 MG/1
40 TABLET ORAL DAILY PRN
Status: DISCONTINUED | OUTPATIENT
Start: 2022-04-13 | End: 2022-04-18 | Stop reason: HOSPADM

## 2022-04-13 RX ORDER — FAMOTIDINE 20 MG/1
40 TABLET, FILM COATED ORAL DAILY
Status: DISCONTINUED | OUTPATIENT
Start: 2022-04-14 | End: 2022-04-18 | Stop reason: HOSPADM

## 2022-04-13 RX ORDER — FAMOTIDINE 20 MG/1
40 TABLET, FILM COATED ORAL 2 TIMES DAILY
Status: DISCONTINUED | OUTPATIENT
Start: 2022-04-13 | End: 2022-04-13

## 2022-04-13 RX ORDER — TRAZODONE HYDROCHLORIDE 50 MG/1
25 TABLET ORAL
Status: DISCONTINUED | OUTPATIENT
Start: 2022-04-13 | End: 2022-04-18 | Stop reason: HOSPADM

## 2022-04-13 RX ORDER — ATORVASTATIN CALCIUM 20 MG/1
20 TABLET, FILM COATED ORAL
Status: DISCONTINUED | OUTPATIENT
Start: 2022-04-13 | End: 2022-04-18 | Stop reason: HOSPADM

## 2022-04-13 RX ADMIN — RISPERIDONE 0.5 MG: 1 TABLET, FILM COATED ORAL at 09:35

## 2022-04-13 RX ADMIN — FAMOTIDINE 40 MG: 20 TABLET, FILM COATED ORAL at 12:22

## 2022-04-13 RX ADMIN — Medication: at 09:41

## 2022-04-13 RX ADMIN — Medication: at 16:43

## 2022-04-13 RX ADMIN — DICLOFENAC SODIUM 4 G: 10 GEL TOPICAL at 09:40

## 2022-04-13 RX ADMIN — SERTRALINE 50 MG: 50 TABLET, FILM COATED ORAL at 09:35

## 2022-04-13 RX ADMIN — RISPERIDONE 0.5 MG: 1 TABLET, FILM COATED ORAL at 20:23

## 2022-04-13 RX ADMIN — TRAZODONE HYDROCHLORIDE 25 MG: 50 TABLET ORAL at 20:23

## 2022-04-13 RX ADMIN — ATORVASTATIN CALCIUM 20 MG: 20 TABLET, FILM COATED ORAL at 20:24

## 2022-04-13 RX ADMIN — DICLOFENAC SODIUM 4 G: 10 GEL TOPICAL at 20:26

## 2022-04-13 NOTE — PROGRESS NOTES
PHYSICAL THERAPY EVALUATION/DISCHARGE  Patient: Dewey Reeves (60 y.o. female)  Date: 4/13/2022  Primary Diagnosis: Unspecified mood [facility]affective) [facility] ([facility] [F39]       Precautions:   Fall      ASSESSMENT  Based on the objective data described below, the patient presents with near baseline functional mobility. Patient does present with scoliosis with a hip drop on the R which causes her to have trendelenburg type gait. However, anticipate this has been her gait pattern for quite some time and is able to demo safe ambulation on level surfaces with use of cane. Suggest patient may want to transition to RW at some point to assist with balance and increased safety but right now patient is not receptive to this. No further PT needs. Suggest home with [facility] PT vs no services. Other factors to consider for discharge: lives alone     Further skilled acute physical therapy is not indicated at this time. PLAN :  Recommendation for discharge: (in order for the patient to meet his/her long term goals)  No skilled physical therapy/ follow up rehabilitation needs identified at this time. vs  PT if patient and family want      IF patient discharges home will need the following DME: rolling walker       SUBJECTIVE:   Patient stated I am fine with this cane.     OBJECTIVE DATA SUMMARY:   HISTORY:    Past Medical History:   Diagnosis Date    Chronic pain     GERD (gastroesophageal reflux disease)     Hypercholesteremia     Hypertension     Ill-defined condition     DJD    Ill-defined condition     Diverticulosis     Past Surgical History:   Procedure Laterality Date    HX DILATION AND CURETTAGE      HX HEENT  2001    Oral surgery - gum disease    HX ORTHOPAEDIC Right 2006    Trigger finger repair    UPPER GI ENDOSCOPY,BIOPSY  9/27/2021            Prior level of function: independent with ambulation using a SPC. Lives alone.  Longstanding back issues  Personal factors and/or comorbidities impacting plan of care:     Home Situation  Home Environment: Private residence  # Steps to Enter: 3  Rails to Enter: Yes  Hand Rails : Right  One/Two Story Residence: One story  Living Alone: Yes  Support Systems: Child(dewayne)  Patient Expects to be Discharged to[de-identified] Home  Current DME Used/Available at Home: Cane, straight    EXAMINATION/PRESENTATION/DECISION MAKING:   Critical Behavior:  Neurologic State: Alert           Hearing: Auditory  Auditory Impairment: None    Range Of Motion:  AROM: Generally decreased, functional                       Strength:    Strength: Generally decreased, functional              Functional Mobility:  Bed Mobility:   not tested           Transfers:  Sit to Stand: Stand-by assistance  Stand to Sit: Stand-by assistance                       Balance:   Sitting: Intact  Standing: Intact; With support  Ambulation/Gait Training:     Assistive Device: Cane, straight  Ambulation - Level of Assistance: Stand-by assistance     Gait Description (WDL): Exceptions to WDL  Gait Abnormalities: Decreased step clearance        Base of Support: Center of gravity altered; Widened     Speed/Nicole: Pace decreased (<100 feet/min); Slow  Step Length: Left shortened;Right shortened       Pain Rating:  No c/o pain    Activity Tolerance:   Fair and requires rest breaks      After treatment patient left in no apparent distress:   Sitting in chair and Call bell within reach    COMMUNICATION/EDUCATION:   The patients plan of care was discussed with: Physical therapist and Registered nurse. Fall prevention education was provided and the patient/caregiver indicated understanding., Patient/family have participated as able in goal setting and plan of care. and Patient/family agree to work toward stated goals and plan of care.     Thank you for this referral.  Bradley Sarmiento, PT, DPT   Time Calculation: 17 mins

## 2022-04-13 NOTE — PROGRESS NOTES
Problem: Altered Thought Process (Adult/Pediatric)  Goal: *STG: Participates in treatment plan  Outcome: Progressing Towards Goal  Goal: *STG: Complies with medication therapy  Outcome: Progressing Towards Goal  Goal: *STG: Attends activities and groups  Outcome: Progressing Towards Goal  Goal: *STG: Decreased hallucinations  Outcome: Progressing Towards Goal  Goal: *STG: Demonstrates ability to understand and use improved judgment in daily activities and relationships  Outcome: Progressing Towards Goal  Goal: Interventions  Outcome: Progressing Towards Goal     Problem: Patient Education: Go to Patient Education Activity  Goal: Patient/Family Education  Outcome: Progressing Towards Goal   Pt is visible unit  No voiced complaints or acute distress at present  No voiced complaint of increased sx of depression

## 2022-04-13 NOTE — INTERDISCIPLINARY ROUNDS
Behavioral Health Interdisciplinary Rounds     Patient Name: Josué Lozano  Age: 79 y.o.   Room/Bed:  [facility]  Primary Diagnosis: <principal problem not specified>   Admission Status: Voluntary     Readmission within 30 days: no  Power of  in place: no  Patient requires a blocked bed: no          Reason for blocked bed:     VTE Prophylaxis: No    Mobility needs/Fall risk: yes  Flu Vaccine : no   Nutritional Plan: no  Consults:          Labs/Testing due today?: yes    Sleep hours: 7       Participation in Care/Groups:  yes  Medication Compliant?: Yes  PRNS (last 24 hours): Pain    Restraints (last 24 hours):  no     CIWA (range last 24 hours):     COWS (range last 24 hours):      Alcohol screening (AUDIT) completed -   AUDIT Score: 0     If applicable, date SBIRT discussed in treatment team AND documented:   AUDIT Screen Score: AUDIT Score: 0    Tobacco - patient is a smoker: Have You Used Tobacco in the Past 30 Days: No  Illegal Drugs use: Have You Used Any Illegal Substances Over the Past 12 Months: No    24 hour chart check complete: yes   ____________________________________________________________________________________________________________    Patient goal(s) for today:   Treatment team focus/goals: plan to titrate her sleep medication    Progress note: she reports she did not sleep well last night, feels safe in the hospital , but does not feel safe to go home, She has been compliant with her treatment, denies any issues with her medications,     Family meeting tomorrow at 4 with her children to discuss discharge to [facility]      LOS:  2  Expected LOS: Monday     Financial concerns/prescription coverage:  Medicare   Family contact:  Son      Family requesting physician contact today:    Discharge plan: assisted living placement   Access to weapons :  No        Outpatient provider(s): JODY   Patient's preferred phone number for follow up call :   Patient's preferred e-mail address :  Participating treatment team members: TED Samaniego- Miroslava Chauhan RN

## 2022-04-13 NOTE — PROGRESS NOTES
Problem: Falls - Risk of  Goal: *Absence of Falls  Description: Document Morene Hole Fall Risk and appropriate interventions in the flowsheet. Outcome: Progressing Towards Goal  Note: Fall Risk Interventions:  Mobility Interventions: Communicate number of staff needed for ambulation/transfer,Utilize walker, cane, or other assistive device    Mentation Interventions: Bed/chair exit alarm    Medication Interventions: Teach patient to arise slowly    Elimination Interventions:  Toileting schedule/hourly rounds    History of Falls Interventions: Bed/chair exit alarm    Pt appears asleep in bed, NAD, even respirations, will continue to monitor q15min

## 2022-04-13 NOTE — BH NOTES
Chief Complaint:  I feel okay today. Length of Stay: 2 Days    Interval History:  Beau May reports feeling \"okay\". Says she slept poorly last night and woke up in the middle of the night and it took her more than an hour to go back to sleep. Her delusion regarding her home persists but says she is less worried about it in the hospital. She has remained calm and compliant with taking medications. At the present time the patient Arvell Harada remains compliant with taking medications. Denies any adverse events from taking them and feels they have been beneficial.       Past Medical History:  Past Medical History:   Diagnosis Date    Chronic pain     GERD (gastroesophageal reflux disease)     Hypercholesteremia     Hypertension     Ill-defined condition     DJD    Ill-defined condition     Diverticulosis           Labs:  Lab Results   Component Value Date/Time    WBC 6.8 04/11/2022 02:27 PM    HGB 10.7 (L) 04/11/2022 02:27 PM    HCT 32.9 (L) 04/11/2022 02:27 PM    PLATELET 637 46/10/7291 02:27 PM    MCV 93.5 04/11/2022 02:27 PM      Lab Results   Component Value Date/Time    Sodium 139 04/11/2022 02:27 PM    Potassium 3.6 04/11/2022 02:27 PM    Chloride 108 04/11/2022 02:27 PM    CO2 27 04/11/2022 02:27 PM    Anion gap 4 (L) 04/11/2022 02:27 PM    Glucose 99 04/13/2022 06:46 AM    BUN 24 (H) 04/11/2022 02:27 PM    Creatinine 0.86 04/11/2022 02:27 PM    BUN/Creatinine ratio 28 (H) 04/11/2022 02:27 PM    GFR est AA >60 04/11/2022 02:27 PM    GFR est non-AA >60 04/11/2022 02:27 PM    Calcium 9.2 04/11/2022 02:27 PM    Bilirubin, total 1.1 (H) 04/11/2022 02:27 PM    Alk.  phosphatase 176 (H) 04/11/2022 02:27 PM    Protein, total 6.8 04/11/2022 02:27 PM    Albumin 3.4 (L) 04/11/2022 02:27 PM    Globulin 3.4 04/11/2022 02:27 PM    A-G Ratio 1.0 (L) 04/11/2022 02:27 PM    ALT (SGPT) 23 04/11/2022 02:27 PM      Vitals:    04/12/22 1639 04/12/22 2035 04/13/22 0746 04/13/22 1111   BP: 115/78 122/64 123/70 123/83   Pulse: 86 71 95 72   Resp: 20 16 16 16   Temp: 98 °F (36.7 °C) 97.4 °F (36.3 °C) 98.1 °F (36.7 °C) 98 °F (36.7 °C)   SpO2: 98% 95% 95%    Weight:       Height:             Current Facility-Administered Medications   Medication Dose Route Frequency Provider Last Rate Last Admin    [START ON 4/14/2022] furosemide (LASIX) tablet 40 mg  40 mg Oral DAILY Odessa James MD        famotidine (PEPCID) tablet 40 mg  40 mg Oral BID Odessa James MD        atorvastatin (LIPITOR) tablet 20 mg  20 mg Oral QHS Odessa James MD        diclofenac (VOLTAREN) 1 % topical gel 4 g  4 g Topical Q6H Odessa James MD   4 g at 04/13/22 0940    sertraline (ZOLOFT) tablet 50 mg  50 mg Oral DAILY Odessa James MD   50 mg at 04/13/22 0935    risperiDONE (RisperDAL) tablet 0.5 mg  0.5 mg Oral BID Odessa James MD   0.5 mg at 04/13/22 0935    ammonium lactate (LAC-HYDRIN) 12 % lotion   Topical BID Odessa James MD   Given at 04/13/22 0941    OLANZapine (ZyPREXA) tablet 2.5 mg  2.5 mg Oral Q6H PRN Travis Jesse, NANCIE        haloperidol lactate (HALDOL) injection 2.5 mg  2.5 mg IntraMUSCular Q6H PRN Thaddeus Molina, NANCIE        benztropine (COGENTIN) tablet 0.5 mg  0.5 mg Oral BID PRN Travis NANCIE Molina        diphenhydrAMINE (BENADRYL) injection 25 mg  25 mg IntraMUSCular BID PRN Thaddeus Molina NP        acetaminophen (TYLENOL) tablet 650 mg  650 mg Oral Q4H PRN Travis Jesse NP   650 mg at 04/12/22 1642    magnesium hydroxide (MILK OF MAGNESIA) 400 mg/5 mL oral suspension 30 mL  30 mL Oral DAILY PRN Thaddeus Molina NP             Mental Status Exam:  Eye contact: fair  Grooming: fair  Psychomotor activity: WNL  Speech is spontaneous, coherent  Mood is \"fine\"  Affect: Flat  Perception: Denies any AH or VH  Delusions of persecution and reference +  Suicidal ideation:   Cognition is grossly intact       Physical Exam:  Body habitus: Body mass index is 20.6 kg/m².   Musculoskeletal system: normal gait  Tremor - neg  Cog wheeling - neg      Assessment and Plan:  Kathy Taylor meets criteria for a diagnosis of Unspecified psychosis, rule out dementia with behavioral disturbance. Trazodone 25mg QHS. Continue the medication regimen as prescribed  Disposition planning to continue. A coordinated, multidisplinary treatment team round was conducted with the patient, nurses, pharmcist,  and writer present. Discussions held with , and/or with family members; Complete current electronic health record for patient was reviewed in full including consultant notes, ancillary staff notes, nurses and tech notes, labs and vitals. I certify that this patients inpatient psychiatric hospital services furnished since the previous certification were, and continue to be, required for treatment that could reasonably be expected to improve the patient's condition, or for diagnostic study, and that the patient continues to need, on a daily basis, active treatment furnished directly by or requiring the supervision of inpatient psychiatric facility personnel. In addition, the hospital records show that services furnished were intensive treatment services, admission or related services, or equivalent services.

## 2022-04-13 NOTE — PROGRESS NOTES
Problem: Altered Thought Process (Adult/Pediatric)  Goal: Interventions  Outcome: Progressing Towards Goal     Problem: Patient Education: Go to Patient Education Activity  Goal: Patient/Family Education  Outcome: Progressing Towards Goal     Problem: Falls - Risk of  Goal: *Absence of Falls  Description: Document Ray Magaña Fall Risk and appropriate interventions in the flowsheet. Outcome: Progressing Towards Goal  Note: Fall Risk Interventions:  Mobility Interventions: Communicate number of staff needed for ambulation/transfer    Mentation Interventions: Bed/chair exit alarm    Medication Interventions: Teach patient to arise slowly    Elimination Interventions: Toileting schedule/hourly rounds    History of Falls Interventions: Bed/chair exit alarm         Problem: Patient Education: Go to Patient Education Activity  Goal: Patient/Family Education  Outcome: Progressing Towards Goal     Denies SI/HI. Denies AH/VH. Meal compliant and medication compliant. Pt appears in no acute distress. No further complaints at this time; will continue to monitor q15 minutes for safety checks. Pt sitting quietly at dining room table.

## 2022-04-14 ENCOUNTER — APPOINTMENT (OUTPATIENT)
Dept: GENERAL RADIOLOGY | Age: 82
DRG: 885 | End: 2022-04-14
Attending: PSYCHIATRY & NEUROLOGY
Payer: MEDICARE

## 2022-04-14 PROCEDURE — 71045 X-RAY EXAM CHEST 1 VIEW: CPT

## 2022-04-14 PROCEDURE — 74011250637 HC RX REV CODE- 250/637: Performed by: PSYCHIATRY & NEUROLOGY

## 2022-04-14 PROCEDURE — 65220000003 HC RM SEMIPRIVATE PSYCH

## 2022-04-14 RX ORDER — SUCRALFATE 1 G/1
1 TABLET ORAL
Status: DISCONTINUED | OUTPATIENT
Start: 2022-04-14 | End: 2022-04-18 | Stop reason: HOSPADM

## 2022-04-14 RX ORDER — ACETAMINOPHEN AND DIPHENHYDRAMINE HYDROCHLORIDE 500; 25 MG/1; MG/1
1 TABLET, FILM COATED ORAL
COMMUNITY
End: 2022-04-18

## 2022-04-14 RX ORDER — CLONAZEPAM 0.5 MG/1
0.25 TABLET ORAL
COMMUNITY
End: 2022-04-18

## 2022-04-14 RX ADMIN — SUCRALFATE 1 G: 1 TABLET ORAL at 17:30

## 2022-04-14 RX ADMIN — Medication: at 09:11

## 2022-04-14 RX ADMIN — DICLOFENAC SODIUM 4 G: 10 GEL TOPICAL at 15:00

## 2022-04-14 RX ADMIN — TRAZODONE HYDROCHLORIDE 25 MG: 50 TABLET ORAL at 20:51

## 2022-04-14 RX ADMIN — DICLOFENAC SODIUM 4 G: 10 GEL TOPICAL at 09:09

## 2022-04-14 RX ADMIN — SERTRALINE 50 MG: 50 TABLET, FILM COATED ORAL at 09:06

## 2022-04-14 RX ADMIN — DICLOFENAC SODIUM 4 G: 10 GEL TOPICAL at 20:59

## 2022-04-14 RX ADMIN — ATORVASTATIN CALCIUM 20 MG: 20 TABLET, FILM COATED ORAL at 20:52

## 2022-04-14 RX ADMIN — FAMOTIDINE 40 MG: 20 TABLET ORAL at 09:05

## 2022-04-14 RX ADMIN — RISPERIDONE 0.5 MG: 1 TABLET, FILM COATED ORAL at 09:06

## 2022-04-14 RX ADMIN — Medication: at 17:33

## 2022-04-14 RX ADMIN — RISPERIDONE 0.5 MG: 1 TABLET, FILM COATED ORAL at 20:52

## 2022-04-14 NOTE — PROGRESS NOTES
Laboratory Monitoring for Antipsychotics: This patient is currently prescribed the following medication(s):   Current Facility-Administered Medications   Medication Dose Route Frequency    atorvastatin (LIPITOR) tablet 20 mg  20 mg Oral QHS    traZODone (DESYREL) tablet 25 mg  25 mg Oral QHS    famotidine (PEPCID) tablet 40 mg  40 mg Oral DAILY    diclofenac (VOLTAREN) 1 % topical gel 4 g  4 g Topical Q6H    sertraline (ZOLOFT) tablet 50 mg  50 mg Oral DAILY    risperiDONE (RisperDAL) tablet 0.5 mg  0.5 mg Oral BID    ammonium lactate (LAC-HYDRIN) 12 % lotion   Topical BID     The following labs have been completed for monitoring of antipsychotics and/or mood stabilizers:    Height, Weight, BMI Estimation  Estimated body mass index is 20.6 kg/m² as calculated from the following:    Height as of this encounter: 162.6 cm (64\"). Weight as of this encounter: 54.4 kg (120 lb). Vital Signs/Blood Pressure  Visit Vitals  BP (!) 149/76   Pulse 73   Temp 97.5 °F (36.4 °C)   Resp 16   Ht 162.6 cm (64\")   Wt 54.4 kg (120 lb)   SpO2 98%   BMI 20.60 kg/m²     Renal Function, Hepatic Function and Chemistry  Estimated Creatinine Clearance: 43.3 mL/min (by C-G formula based on SCr of 0.86 mg/dL). Lab Results   Component Value Date/Time    Sodium 139 04/11/2022 02:27 PM    Potassium 3.6 04/11/2022 02:27 PM    Chloride 108 04/11/2022 02:27 PM    CO2 27 04/11/2022 02:27 PM    Anion gap 4 (L) 04/11/2022 02:27 PM    BUN 24 (H) 04/11/2022 02:27 PM    Creatinine 0.86 04/11/2022 02:27 PM    BUN/Creatinine ratio 28 (H) 04/11/2022 02:27 PM    Bilirubin, total 1.1 (H) 04/11/2022 02:27 PM    Protein, total 6.8 04/11/2022 02:27 PM    Albumin 3.4 (L) 04/11/2022 02:27 PM    Globulin 3.4 04/11/2022 02:27 PM    A-G Ratio 1.0 (L) 04/11/2022 02:27 PM    ALT (SGPT) 23 04/11/2022 02:27 PM    AST (SGOT) 21 04/11/2022 02:27 PM    Alk.  phosphatase 176 (H) 04/11/2022 02:27 PM     Lab Results   Component Value Date/Time    Glucose 99 04/13/2022 06:46 AM     No results found for: HBA1C, [id number]    Hematology  Lab Results   Component Value Date/Time    WBC 6.8 04/11/2022 02:27 PM    RBC 3.52 (L) 04/11/2022 02:27 PM    HGB 10.7 (L) 04/11/2022 02:27 PM    HCT 32.9 (L) 04/11/2022 02:27 PM    MCV 93.5 04/11/2022 02:27 PM    MCH 30.4 04/11/2022 02:27 PM    MCHC 32.5 04/11/2022 02:27 PM    RDW 13.0 04/11/2022 02:27 PM    PLATELET 612 30/06/8059 02:27 PM     Lipids  Lab Results   Component Value Date/Time    Cholesterol, total 159 04/13/2022 06:46 AM    HDL Cholesterol 59 04/13/2022 06:46 AM    LDL, calculated 83.8 04/13/2022 06:46 AM    Triglyceride 81 04/13/2022 06:46 AM    CHOL/HDL Ratio 2.7 04/13/2022 06:46 AM     Thyroid Function  Lab Results   Component Value Date/Time    TSH 3.13 04/13/2022 06:46 AM     Assessment/Plan:  Recommended baseline laboratory monitoring has been completed based on this patient's current medication regimen. No further interventions are needed at this time. Follow-up metabolic monitoring labs should be completed in 3 months (quarterly for the first year of antipsychotic therapy).      Lisandra Gutierrez, DAMASOD

## 2022-04-14 NOTE — PROGRESS NOTES
Problem: Altered Thought Process (Adult/Pediatric)  Goal: *STG: Participates in treatment plan  Outcome: Progressing Towards Goal  Note: Out on unit engaged w a bright affect, mood stable noted some slight confabulation when discussing admission events. Does not share paranoid thoughts and does not voice concern over her safety. Staff focus is on offering support and reassurance.    Goal: *STG: Demonstrates ability to understand and use improved judgment in daily activities and relationships  Outcome: Progressing Towards Goal  Goal: Interventions  Outcome: Progressing Towards Goal

## 2022-04-14 NOTE — BH NOTES
Chief Complaint:  I am feeling okay. Length of Stay: 3 Days    Interval History:  Kellie Caldwell reports sleeping better last night. Says she woke up once due to feeling \"indigestion\" but was able to go back to sleep quickly. She made contact with her children and enjoys the support from them. She is still ambivalent about going to an [facility] and has a family meeting scheduled later today. She is less preoccupied with her delusions and says she would feel safe going back to her home after discharge. Her family remains concerned that she would not be safe there. At the present time the patient Luc Khan remains compliant with taking medications. Denies any adverse events from taking them and feels they have been beneficial.         Past Medical History:  Past Medical History:   Diagnosis Date    Chronic pain     GERD (gastroesophageal reflux disease)     Hypercholesteremia     Hypertension     Ill-defined condition     DJD    Ill-defined condition     Diverticulosis           Labs:  Lab Results   Component Value Date/Time    WBC 6.8 04/11/2022 02:27 PM    HGB 10.7 (L) 04/11/2022 02:27 PM    HCT 32.9 (L) 04/11/2022 02:27 PM    PLATELET 809 35/34/8357 02:27 PM    MCV 93.5 04/11/2022 02:27 PM      Lab Results   Component Value Date/Time    Sodium 139 04/11/2022 02:27 PM    Potassium 3.6 04/11/2022 02:27 PM    Chloride 108 04/11/2022 02:27 PM    CO2 27 04/11/2022 02:27 PM    Anion gap 4 (L) 04/11/2022 02:27 PM    Glucose 99 04/13/2022 06:46 AM    BUN 24 (H) 04/11/2022 02:27 PM    Creatinine 0.86 04/11/2022 02:27 PM    BUN/Creatinine ratio 28 (H) 04/11/2022 02:27 PM    GFR est AA >60 04/11/2022 02:27 PM    GFR est non-AA >60 04/11/2022 02:27 PM    Calcium 9.2 04/11/2022 02:27 PM    Bilirubin, total 1.1 (H) 04/11/2022 02:27 PM    Alk.  phosphatase 176 (H) 04/11/2022 02:27 PM    Protein, total 6.8 04/11/2022 02:27 PM    Albumin 3.4 (L) 04/11/2022 02:27 PM    Globulin 3.4 04/11/2022 02:27 PM    A-G Ratio 1.0 (L) 04/11/2022 02:27 PM    ALT (SGPT) 23 04/11/2022 02:27 PM      Vitals:    04/13/22 1602 04/13/22 2010 04/14/22 0717 04/14/22 1132   BP: (!) 160/88 114/65 118/88 (!) 149/76   Pulse: 99 86 86 73   Resp: 16 16 16 16   Temp: 97.9 °F (36.6 °C) 97.7 °F (36.5 °C) 98 °F (36.7 °C) 97.5 °F (36.4 °C)   SpO2: 97% 96% 96% 98%   Weight:       Height:             Current Facility-Administered Medications   Medication Dose Route Frequency Provider Last Rate Last Admin    furosemide (LASIX) tablet 40 mg  40 mg Oral DAILY PRN Gideon Puente MD        atorvastatin (LIPITOR) tablet 20 mg  20 mg Oral QHS Gideon Puente MD   20 mg at 04/13/22 2024    traZODone (DESYREL) tablet 25 mg  25 mg Oral QHS Gideon Puente MD   25 mg at 04/13/22 2023    famotidine (PEPCID) tablet 40 mg  40 mg Oral DAILY Gideon Puente MD   40 mg at 04/14/22 0905    diclofenac (VOLTAREN) 1 % topical gel 4 g  4 g Topical Q6H Gideon Puente MD   4 g at 04/14/22 5225    sertraline (ZOLOFT) tablet 50 mg  50 mg Oral DAILY Gideon Puente MD   50 mg at 04/14/22 5678    risperiDONE (RisperDAL) tablet 0.5 mg  0.5 mg Oral BID Gideon Puente MD   0.5 mg at 04/14/22 2117    ammonium lactate (LAC-HYDRIN) 12 % lotion   Topical BID Gideon Puente MD   Given at 04/14/22 0911    OLANZapine (ZyPREXA) tablet 2.5 mg  2.5 mg Oral Q6H PRN De Dickinson, NP        haloperidol lactate (HALDOL) injection 2.5 mg  2.5 mg IntraMUSCular Q6H PRN De Dickinson, NP        benztropine (COGENTIN) tablet 0.5 mg  0.5 mg Oral BID PRN De Dickinson, NP        diphenhydrAMINE (BENADRYL) injection 25 mg  25 mg IntraMUSCular BID PRN De Dickinson, NP        acetaminophen (TYLENOL) tablet 650 mg  650 mg Oral Q4H PRN Lemuel Lux NP   650 mg at 04/12/22 1642    magnesium hydroxide (MILK OF MAGNESIA) 400 mg/5 mL oral suspension 30 mL  30 mL Oral DAILY PRN Lemuel Lux NP             Mental Status Exam:  Eye contact: fair  Grooming: fair  Psychomotor activity: WNL  Speech is spontaneous, coherent  Mood is \"fine\"  Affect: Flat  Perception: Denies any AH or VH  Delusions of persecution and reference +  Suicidal ideation:   Cognition is grossly intact       Physical Exam:  Body habitus: Body mass index is 20.6 kg/m². Musculoskeletal system: normal gait  Tremor - neg  Cog wheeling - neg      Assessment and Plan:  Kobi Allison meets criteria for a diagnosis of Unspecified psychosis, rule out dementia with behavioral disturbance. Continue the medication regimen as prescribed  Disposition planning to continue. A coordinated, multidisplinary treatment team round was conducted with the patient, nurses, pharmcist,  and writer present. Discussions held with , and/or with family members; Complete current electronic health record for patient was reviewed in full including consultant notes, ancillary staff notes, nurses and tech notes, labs and vitals. I certify that this patients inpatient psychiatric hospital services furnished since the previous certification were, and continue to be, required for treatment that could reasonably be expected to improve the patient's condition, or for diagnostic study, and that the patient continues to need, on a daily basis, active treatment furnished directly by or requiring the supervision of inpatient psychiatric facility personnel. In addition, the hospital records show that services furnished were intensive treatment services, admission or related services, or equivalent services.

## 2022-04-14 NOTE — PROGRESS NOTES
Problem: Altered Thought Process (Adult/Pediatric)  Goal: *STG: Participates in treatment plan  Outcome: Progressing Towards Goal  Pt complies with meds and verbalizes her needs appropriately.

## 2022-04-14 NOTE — BH NOTES
GROUP THERAPY PROGRESS NOTE    Mayda Perez is participating in [facility] Group.      Group time: 15 minutes      Group therapy participation: active    Therapeutic interventions reviewed and discussed:  Music group     Impression of participation: Pt was present and interactive

## 2022-04-14 NOTE — INTERDISCIPLINARY ROUNDS
Behavioral Health Interdisciplinary Rounds     Patient Name: Papito Bennett  Age: 79 y.o. Room/Bed:  [facility]  Primary Diagnosis: <principal problem not specified>   Admission Status: Voluntary     Readmission within 30 days: no  Power of  in place: no  Patient requires a blocked bed: no          Reason for blocked bed:     VTE Prophylaxis: No    Mobility needs/Fall risk: yes  Flu Vaccine : no   Nutritional Plan: no  Consults:          Labs/Testing due today?: no    Sleep hours: 7       Participation in Care/Groups:  no  Medication Compliant?: Yes  PRNS (last 24 hours): Pain    Restraints (last 24 hours):  no     CIWA (range last 24 hours):     COWS (range last 24 hours):      Alcohol screening (AUDIT) completed -   AUDIT Score: 0     If applicable, date SBIRT discussed in treatment team AND documented:   AUDIT Screen Score: AUDIT Score: 0    Tobacco - patient is a smoker: Have You Used Tobacco in the Past 30 Days: No  Illegal Drugs use: Have You Used Any Illegal Substances Over the Past 12 Months: No    24 hour chart check complete: no   ____________________________________________________________________________________________________________    Patient goal(s) for today:   Treatment team focus/goals: Plan to complete assisted living paperwork.    Progress note : family session went well, we will move forward with assisted living placement / discharge on Monday     LOS:  3  Expected LOS: Monday     Financial concerns/prescription coverage:  no  Family contact: son       Family requesting physician contact today:  no  Discharge plan: [facility]   Access to weapons : no       Outpatient provider(s): Neuro follow up   Patient's preferred phone number for follow up call :   Patient's preferred e-mail address :  Participating treatment team members: PapitoRaúl Loja Dr.

## 2022-04-14 NOTE — PROGRESS NOTES
Problem: Falls - Risk of  Goal: *Absence of Falls  Description: Document Heri Jackson Fall Risk and appropriate interventions in the flowsheet. Outcome: Progressing Towards Goal  Note: Fall Risk Interventions:  Mobility Interventions: Assess mobility with egress test    Mentation Interventions: Bed/chair exit alarm    Medication Interventions: Teach patient to arise slowly    Elimination Interventions: Toilet paper/wipes in reach    History of Falls Interventions: Door open when patient unattended       Patient received, appears to be asleep, eyes closed, breathing even and unlabored. No signs of distress noted. Will continue to monitor for safety.

## 2022-04-15 PROCEDURE — 74011250637 HC RX REV CODE- 250/637: Performed by: NURSE PRACTITIONER

## 2022-04-15 PROCEDURE — 74011250637 HC RX REV CODE- 250/637: Performed by: PSYCHIATRY & NEUROLOGY

## 2022-04-15 PROCEDURE — 65220000003 HC RM SEMIPRIVATE PSYCH

## 2022-04-15 RX ORDER — LISINOPRIL 10 MG/1
10 TABLET ORAL DAILY
Status: DISCONTINUED | OUTPATIENT
Start: 2022-04-15 | End: 2022-04-18 | Stop reason: HOSPADM

## 2022-04-15 RX ADMIN — DICLOFENAC SODIUM 4 G: 10 GEL TOPICAL at 21:25

## 2022-04-15 RX ADMIN — SERTRALINE 50 MG: 50 TABLET, FILM COATED ORAL at 09:25

## 2022-04-15 RX ADMIN — SUCRALFATE 1 G: 1 TABLET ORAL at 19:27

## 2022-04-15 RX ADMIN — RISPERIDONE 0.5 MG: 1 TABLET, FILM COATED ORAL at 09:25

## 2022-04-15 RX ADMIN — FUROSEMIDE 40 MG: 40 TABLET ORAL at 09:30

## 2022-04-15 RX ADMIN — SUCRALFATE 1 G: 1 TABLET ORAL at 09:01

## 2022-04-15 RX ADMIN — FAMOTIDINE 40 MG: 20 TABLET ORAL at 09:25

## 2022-04-15 RX ADMIN — RISPERIDONE 0.5 MG: 1 TABLET, FILM COATED ORAL at 21:24

## 2022-04-15 RX ADMIN — TRAZODONE HYDROCHLORIDE 25 MG: 50 TABLET ORAL at 21:24

## 2022-04-15 RX ADMIN — Medication: at 09:50

## 2022-04-15 RX ADMIN — DICLOFENAC SODIUM 4 G: 10 GEL TOPICAL at 09:54

## 2022-04-15 RX ADMIN — LISINOPRIL 10 MG: 10 TABLET ORAL at 11:49

## 2022-04-15 RX ADMIN — ATORVASTATIN CALCIUM 20 MG: 20 TABLET, FILM COATED ORAL at 21:24

## 2022-04-15 NOTE — BH NOTES
Received pt resting in bed. Note pt had family meeting with  this evening. Pt appears slightly anxious prior. Pleasant and cooperative . Ambulates steady with cane.   Chest xray completed this pm

## 2022-04-15 NOTE — PROGRESS NOTES
Problem: Altered Thought Process (Adult/Pediatric)  Goal: *STG: Participates in treatment plan  Outcome: Progressing Towards Goal  Goal: *STG: Complies with medication therapy  Outcome: Progressing Towards Goal  Goal: *STG: Attends activities and groups  Outcome: Progressing Towards Goal  Goal: *STG: Decreased hallucinations  Outcome: Progressing Towards Goal  Goal: *STG: Demonstrates ability to understand and use improved judgment in daily activities and relationships  Outcome: Progressing Towards Goal  Goal: Interventions  Outcome: Progressing Towards Goal   Pt is visible on unit  Pt continues to be alert and oriented at present  No voiced complaints or acute distress at present

## 2022-04-15 NOTE — PROGRESS NOTES
Problem: Altered Thought Process (Adult/Pediatric)  Goal: *STG: Participates in treatment plan  Outcome: Progressing Towards Goal  Note: Increase time out on unit, engaged and social. Mood and affect brighter and hopeful. No paranoid statements made, no paranoia noted.  Staff focus is on offering support and discussing benefits of [employer]  Goal: *STG: Complies with medication therapy  Outcome: Resolved/Met  Goal: *STG: Decreased hallucinations  Outcome: Resolved/Met  Goal: *STG: Demonstrates ability to understand and use improved judgment in daily activities and relationships  Outcome: Progressing Towards Goal  Goal: Interventions  Outcome: Progressing Towards Goal

## 2022-04-15 NOTE — BH NOTES
Chief Complaint:  I am feeling okay. Length of Stay: 4 Days    Interval History:  Josué Lozano says she'd rather be home. She is calm and pleasant. Says she is depressed because she is here. She is less preoccupied with her delusions. She only talks about her neighbor when she's asked. She denies si or hi. At the present time the patient Josué Lozano remains compliant with taking medications. Denies any adverse events from taking them. Past Medical History:  Past Medical History:   Diagnosis Date    Chronic pain     GERD (gastroesophageal reflux disease)     Hypercholesteremia     Hypertension     Ill-defined condition     DJD    Ill-defined condition     Diverticulosis           Labs:  Lab Results   Component Value Date/Time    WBC 6.8 04/11/2022 02:27 PM    HGB 10.7 (L) 04/11/2022 02:27 PM    HCT 32.9 (L) 04/11/2022 02:27 PM    PLATELET 442 30/62/4386 02:27 PM    MCV 93.5 04/11/2022 02:27 PM      Lab Results   Component Value Date/Time    Sodium 139 04/11/2022 02:27 PM    Potassium 3.6 04/11/2022 02:27 PM    Chloride 108 04/11/2022 02:27 PM    CO2 27 04/11/2022 02:27 PM    Anion gap 4 (L) 04/11/2022 02:27 PM    Glucose 99 04/13/2022 06:46 AM    BUN 24 (H) 04/11/2022 02:27 PM    Creatinine 0.86 04/11/2022 02:27 PM    BUN/Creatinine ratio 28 (H) 04/11/2022 02:27 PM    GFR est AA >60 04/11/2022 02:27 PM    GFR est non-AA >60 04/11/2022 02:27 PM    Calcium 9.2 04/11/2022 02:27 PM    Bilirubin, total 1.1 (H) 04/11/2022 02:27 PM    Alk.  phosphatase 176 (H) 04/11/2022 02:27 PM    Protein, total 6.8 04/11/2022 02:27 PM    Albumin 3.4 (L) 04/11/2022 02:27 PM    Globulin 3.4 04/11/2022 02:27 PM    A-G Ratio 1.0 (L) 04/11/2022 02:27 PM    ALT (SGPT) 23 04/11/2022 02:27 PM      Vitals:    04/14/22 1132 04/14/22 1813 04/14/22 2046 04/15/22 0719   BP: (!) 149/76 (!) 155/77 135/79 (!) 148/76   Pulse: 73 74 85 81   Resp: 16 16 16 16   Temp: 97.5 °F (36.4 °C) 97.8 °F (36.6 °C) 97.3 °F (36.3 °C) 97.9 °F (36.6 °C) SpO2: 98% 98% 99% 97%   Weight:       Height:             Current Facility-Administered Medications   Medication Dose Route Frequency Provider Last Rate Last Admin    sucralfate (CARAFATE) tablet 1 g  1 g Oral TID WITH MEALS Gail Escobedo MD   1 g at 04/15/22 0901    furosemide (LASIX) tablet 40 mg  40 mg Oral DAILY PRN Gail Escobedo MD   40 mg at 04/15/22 0930    atorvastatin (LIPITOR) tablet 20 mg  20 mg Oral QHS Gail Escobedo MD   20 mg at 04/14/22 2052    traZODone (DESYREL) tablet 25 mg  25 mg Oral QHS Gail Escobedo MD   25 mg at 04/14/22 2051    famotidine (PEPCID) tablet 40 mg  40 mg Oral DAILY Gail Escobedo MD   40 mg at 04/15/22 0925    diclofenac (VOLTAREN) 1 % topical gel 4 g  4 g Topical Q6H Gail Escobedo MD   4 g at 04/14/22 2059    sertraline (ZOLOFT) tablet 50 mg  50 mg Oral DAILY Gail Escobedo MD   50 mg at 04/15/22 9124    risperiDONE (RisperDAL) tablet 0.5 mg  0.5 mg Oral BID Gail Escobedo MD   0.5 mg at 04/15/22 5902    ammonium lactate (LAC-HYDRIN) 12 % lotion   Topical BID Gail Escobedo MD   Given at 04/14/22 1733    OLANZapine (ZyPREXA) tablet 2.5 mg  2.5 mg Oral Q6H PRN Navneet Mitten, NP        haloperidol lactate (HALDOL) injection 2.5 mg  2.5 mg IntraMUSCular Q6H PRN Navneet Mitten, NP        benztropine (COGENTIN) tablet 0.5 mg  0.5 mg Oral BID PRN Navneet Mitten, NP        diphenhydrAMINE (BENADRYL) injection 25 mg  25 mg IntraMUSCular BID PRN Navneet Mitten, NP        acetaminophen (TYLENOL) tablet 650 mg  650 mg Oral Q4H PRN Navneet Mitten, NP   650 mg at 04/12/22 1642    magnesium hydroxide (MILK OF MAGNESIA) 400 mg/5 mL oral suspension 30 mL  30 mL Oral DAILY PRN Navneet Mitten, NP             Mental Status Exam:  Eye contact: fair  Grooming: fair  Psychomotor activity: WNL  Speech is spontaneous, coherent  Mood is \"fine\"  Affect: blunt  Perception: Denies any AH or VH  Delusions of persecution and reference +  Suicidal ideation: denies si  Cognition is grossly intact       Physical Exam:  Body habitus: Body mass index is 20.6 kg/m². Musculoskeletal system: normal gait  Tremor - neg  Cog wheeling - neg      Assessment and Plan:  Paula Mace meets criteria for a diagnosis of Unspecified psychosis, rule out dementia with behavioral disturbance. Continue the medication regimen as prescribed  Disposition planning to continue. A coordinated, multidisplinary treatment team round was conducted with the patient, nurses, pharmcist,  and writer present. Discussions held with , and/or with family members; Complete current electronic health record for patient was reviewed in full including consultant notes, ancillary staff notes, nurses and tech notes, labs and vitals. I certify that this patients inpatient psychiatric hospital services furnished since the previous certification were, and continue to be, required for treatment that could reasonably be expected to improve the patient's condition, or for diagnostic study, and that the patient continues to need, on a daily basis, active treatment furnished directly by or requiring the supervision of inpatient psychiatric facility personnel. In addition, the hospital records show that services furnished were intensive treatment services, admission or related services, or equivalent services.

## 2022-04-15 NOTE — BH NOTES
Per previous  staff, patient is to have visitors over [facility] weekend. Daughter Jeanne Cortez and 1 additional guest to visit from 1pm-1:30pm on 4/16/2022.

## 2022-04-15 NOTE — INTERDISCIPLINARY ROUNDS
Behavioral Health Interdisciplinary Rounds     Patient Name: Dewey Reeves  Age: 79 y.o. Room/Bed:  744/  Primary Diagnosis: <principal problem not specified>   Admission Status: Voluntary     Readmission within 30 days: no  Power of  in place: no  Patient requires a blocked bed: no          Reason for blocked bed:     VTE Prophylaxis: No    Mobility needs/Fall risk: no  Flu Vaccine : no   Nutritional Plan: no  Consults:          Labs/Testing due today?:    Sleep hours: 8       Participation in Care/Groups:    Medication Compliant?: Yes  PRNS (last 24 hours): Sleep Aid    Restraints (last 24 hours):  no     CIWA (range last 24 hours):     COWS (range last 24 hours):      Alcohol screening (AUDIT) completed -   AUDIT Score: 0     If applicable, date SBIRT discussed in treatment team AND documented:   AUDIT Screen Score: AUDIT Score: 0      Document Brief Intervention (corresponds directly with the 5 A's, Ask, Advise, Assess, Assist, and Arrange): At- Risk Patients (Score 7-15 for women; 8-15 for men)  Discuss concern patient is drinking at unhealthy levels known to increase risk of alcohol-related health problems. Is Patient ready to commit to change? If No:   Encourage reflection   Discuss short term and long term health risks of consuming alcohol   Barriers to change   Reaffirm willingness to help / Educational materials provided  If Yes:   Set goal  [employer] provided    Harmful use or Dependence (Score 16 or greater)   Discuss short term and long term health risks of consuming alcohol   Recommendations   Negotiate drinking goal   Recommend addiction specialist/center   Arrange follow-up appointments.     Tobacco - patient is a smoker: Have You Used Tobacco in the Past 30 Days: No  Illegal Drugs use: Have You Used Any Illegal Substances Over the Past 12 Months: No    24 hour chart check complete: yes ____________________________________________________________________________________________________________    Patient goal(s) for today: Communicate needs to staff  Treatment team focus/goals: Assess pt, manage medications, discharge planning. Progress note Pt reports sleeping and eating well. Pt reports increased depressive symptoms because she is here. Pt denies heightened anxiety. Pt denies SI. Pt expected to discharge Monday 4/18/22    LOS:  4  Expected LOS: 4/18/22    Financial concerns/prescription coverage:  Family contact:  Magda Melendrez, [phone]     Family requesting physician contact today:   Discharge plan: Pt will discharge home and then to assisted lviving.   Access to weapons :  no       Outpatient provider(s):   Patient's preferred phone number for follow up call :   Patient's preferred e-mail address :  Participating treatment team members: JOSHUA Lee, Brad Vee, AngelesD, Reta Chung, NP

## 2022-04-15 NOTE — PROGRESS NOTES
Problem: Altered Thought Process (Adult/Pediatric)  Goal: *STG: Participates in treatment plan  Outcome: Progressing Towards Goal     Problem: Altered Thought Process (Adult/Pediatric)  Goal: *STG: Attends activities and groups  Outcome: Progressing Towards Goal     Problem: Altered Thought Process (Adult/Pediatric)  Goal: Interventions  Outcome: Progressing Towards Goal  Note: Pt is calm and pleasant. Reduced paranoia. Anxious. Pt verbalizes needs and concerns to staff. Medication and meal compliant.

## 2022-04-16 PROCEDURE — 74011250637 HC RX REV CODE- 250/637: Performed by: NURSE PRACTITIONER

## 2022-04-16 PROCEDURE — 74011250637 HC RX REV CODE- 250/637: Performed by: PSYCHIATRY & NEUROLOGY

## 2022-04-16 PROCEDURE — 65220000003 HC RM SEMIPRIVATE PSYCH

## 2022-04-16 RX ADMIN — FAMOTIDINE 40 MG: 20 TABLET ORAL at 08:40

## 2022-04-16 RX ADMIN — Medication: at 08:40

## 2022-04-16 RX ADMIN — DICLOFENAC SODIUM 4 G: 10 GEL TOPICAL at 21:07

## 2022-04-16 RX ADMIN — ATORVASTATIN CALCIUM 20 MG: 20 TABLET, FILM COATED ORAL at 21:07

## 2022-04-16 RX ADMIN — RISPERIDONE 0.5 MG: 1 TABLET, FILM COATED ORAL at 21:07

## 2022-04-16 RX ADMIN — SUCRALFATE 1 G: 1 TABLET ORAL at 11:31

## 2022-04-16 RX ADMIN — LISINOPRIL 10 MG: 10 TABLET ORAL at 08:40

## 2022-04-16 RX ADMIN — DICLOFENAC SODIUM 4 G: 10 GEL TOPICAL at 08:40

## 2022-04-16 RX ADMIN — TRAZODONE HYDROCHLORIDE 25 MG: 50 TABLET ORAL at 21:06

## 2022-04-16 RX ADMIN — SUCRALFATE 1 G: 1 TABLET ORAL at 08:40

## 2022-04-16 RX ADMIN — RISPERIDONE 0.5 MG: 1 TABLET, FILM COATED ORAL at 08:40

## 2022-04-16 RX ADMIN — SERTRALINE 50 MG: 50 TABLET, FILM COATED ORAL at 08:40

## 2022-04-16 RX ADMIN — SUCRALFATE 1 G: 1 TABLET ORAL at 17:02

## 2022-04-16 NOTE — PROGRESS NOTES
Problem: Altered Thought Process (Adult/Pediatric)  Goal: *STG: Participates in treatment plan  Outcome: Progressing Towards Goal  Note: Client is calm in milieu but guarded on assessment and states \"why are you questioning me\" when RN assesses orientation. Client confabulates  And does not have insight into admission reason. She states she is leaving Monday. She is medication compliant. She uses her cane to ambulate  And sleeps most of the evening. Denies any mood disturbances.       Problem: Altered Thought Process (Adult/Pediatric)  Goal: *STG: Attends activities and groups  Outcome: Progressing Towards Goal     Problem: Altered Thought Process (Adult/Pediatric)  Goal: *STG: Demonstrates ability to understand and use improved judgment in daily activities and relationships  Outcome: Progressing Towards Goal

## 2022-04-16 NOTE — PROGRESS NOTES
Problem: Falls - Risk of  Goal: *Absence of Falls  Description: Document Lizbet De La Fuente Fall Risk and appropriate interventions in the flowsheet. Outcome: Progressing Towards Goal  Note: Fall Risk Interventions:  Mobility Interventions: Communicate number of staff needed for ambulation/transfer,Utilize walker, cane, or other assistive device    Mentation Interventions: Bed/chair exit alarm,Door open when patient unattended,Room close to nurse's station    Medication Interventions: Teach patient to arise slowly    Elimination Interventions: Patient to call for help with toileting needs    History of Falls Interventions: Door open when patient unattended,Room close to nurse's station,Bed/chair exit alarm     Problem: Discharge Planning  Goal: *Knowledge of medication management  Outcome: Progressing Towards Goal    Patient complaint with medication, meals and staff directive. Completed adl's with set up assistance, denies pain, ah or si. Sit on unit appropriate conversion with staff and other patient, son and daughter visited with son and daughter in dining room interacting appropriate with them.

## 2022-04-17 PROCEDURE — 74011250637 HC RX REV CODE- 250/637: Performed by: NURSE PRACTITIONER

## 2022-04-17 PROCEDURE — 74011250637 HC RX REV CODE- 250/637: Performed by: PSYCHIATRY & NEUROLOGY

## 2022-04-17 PROCEDURE — 65220000003 HC RM SEMIPRIVATE PSYCH

## 2022-04-17 RX ADMIN — DICLOFENAC SODIUM 4 G: 10 GEL TOPICAL at 08:38

## 2022-04-17 RX ADMIN — SERTRALINE 50 MG: 50 TABLET, FILM COATED ORAL at 08:38

## 2022-04-17 RX ADMIN — RISPERIDONE 0.5 MG: 1 TABLET, FILM COATED ORAL at 20:24

## 2022-04-17 RX ADMIN — LISINOPRIL 10 MG: 10 TABLET ORAL at 08:37

## 2022-04-17 RX ADMIN — ATORVASTATIN CALCIUM 20 MG: 20 TABLET, FILM COATED ORAL at 20:24

## 2022-04-17 RX ADMIN — FAMOTIDINE 40 MG: 20 TABLET ORAL at 08:38

## 2022-04-17 RX ADMIN — TRAZODONE HYDROCHLORIDE 25 MG: 50 TABLET ORAL at 20:24

## 2022-04-17 RX ADMIN — SUCRALFATE 1 G: 1 TABLET ORAL at 10:55

## 2022-04-17 RX ADMIN — Medication: at 08:38

## 2022-04-17 RX ADMIN — DICLOFENAC SODIUM 4 G: 10 GEL TOPICAL at 14:12

## 2022-04-17 RX ADMIN — RISPERIDONE 0.5 MG: 1 TABLET, FILM COATED ORAL at 08:38

## 2022-04-17 RX ADMIN — Medication: at 17:32

## 2022-04-17 RX ADMIN — DICLOFENAC SODIUM 4 G: 10 GEL TOPICAL at 20:25

## 2022-04-17 NOTE — BH NOTES
Chief Complaint:  I am feeling okay. Length of Stay: 5 Days    Interval History:  Kimo Rolon reports that she did not sleep as well last night partially because her room was cold, her room was also changed yesterday. Otherwise she feels that she is doing ok. Denies medication side effects. Denies SI/HI/AH/VH. Past Medical History:  Past Medical History:   Diagnosis Date    Chronic pain     GERD (gastroesophageal reflux disease)     Hypercholesteremia     Hypertension     Ill-defined condition     DJD    Ill-defined condition     Diverticulosis           Labs:  Lab Results   Component Value Date/Time    WBC 6.8 04/11/2022 02:27 PM    HGB 10.7 (L) 04/11/2022 02:27 PM    HCT 32.9 (L) 04/11/2022 02:27 PM    PLATELET 744 56/64/6836 02:27 PM    MCV 93.5 04/11/2022 02:27 PM      Lab Results   Component Value Date/Time    Sodium 139 04/11/2022 02:27 PM    Potassium 3.6 04/11/2022 02:27 PM    Chloride 108 04/11/2022 02:27 PM    CO2 27 04/11/2022 02:27 PM    Anion gap 4 (L) 04/11/2022 02:27 PM    Glucose 99 04/13/2022 06:46 AM    BUN 24 (H) 04/11/2022 02:27 PM    Creatinine 0.86 04/11/2022 02:27 PM    BUN/Creatinine ratio 28 (H) 04/11/2022 02:27 PM    GFR est AA >60 04/11/2022 02:27 PM    GFR est non-AA >60 04/11/2022 02:27 PM    Calcium 9.2 04/11/2022 02:27 PM    Bilirubin, total 1.1 (H) 04/11/2022 02:27 PM    Alk.  phosphatase 176 (H) 04/11/2022 02:27 PM    Protein, total 6.8 04/11/2022 02:27 PM    Albumin 3.4 (L) 04/11/2022 02:27 PM    Globulin 3.4 04/11/2022 02:27 PM    A-G Ratio 1.0 (L) 04/11/2022 02:27 PM    ALT (SGPT) 23 04/11/2022 02:27 PM      Vitals:    04/15/22 1145 04/15/22 1625 04/16/22 0745 04/16/22 1700   BP: 128/64 115/60 123/71 117/78   Pulse: 83 78 79 83   Resp: 16 16 16 16   Temp: 97.7 °F (36.5 °C) 98 °F (36.7 °C) 97.6 °F (36.4 °C) 97.2 °F (36.2 °C)   SpO2:  98% 96% 96%   Weight:       Height:             Current Facility-Administered Medications   Medication Dose Route Frequency Provider Last Rate Last Admin    lisinopriL (PRINIVIL, ZESTRIL) tablet 10 mg  10 mg Oral DAILY Shawnee Quintanilla NP   10 mg at 04/16/22 0840    sucralfate (CARAFATE) tablet 1 g  1 g Oral TID WITH MEALS Gabrielle Cobos MD   1 g at 04/16/22 1702    furosemide (LASIX) tablet 40 mg  40 mg Oral DAILY PRN Gabrielle Cobos MD   40 mg at 04/15/22 0930    atorvastatin (LIPITOR) tablet 20 mg  20 mg Oral QHS Gabrielle Cobos MD   20 mg at 04/15/22 2124    traZODone (DESYREL) tablet 25 mg  25 mg Oral QHS Gabrielle Cobos MD   25 mg at 04/15/22 2124    famotidine (PEPCID) tablet 40 mg  40 mg Oral DAILY Gabrielle Cobos MD   40 mg at 04/16/22 0840    diclofenac (VOLTAREN) 1 % topical gel 4 g  4 g Topical Q6H Gabrielle Cobos MD   4 g at 04/16/22 0840    sertraline (ZOLOFT) tablet 50 mg  50 mg Oral DAILY Gabrielle Cobos MD   50 mg at 04/16/22 0840    risperiDONE (RisperDAL) tablet 0.5 mg  0.5 mg Oral BID Gabrielle Cobos MD   0.5 mg at 04/16/22 0840    ammonium lactate (LAC-HYDRIN) 12 % lotion   Topical BID Gabrielle Cobos MD   Given at 04/16/22 0840    OLANZapine (ZyPREXA) tablet 2.5 mg  2.5 mg Oral Q6H PRN Janice Martins, NP        haloperidol lactate (HALDOL) injection 2.5 mg  2.5 mg IntraMUSCular Q6H PRN Janice Martins, NP        benztropine (COGENTIN) tablet 0.5 mg  0.5 mg Oral BID PRN Janice Martins, NP        diphenhydrAMINE (BENADRYL) injection 25 mg  25 mg IntraMUSCular BID PRN Janice Martins, NP        acetaminophen (TYLENOL) tablet 650 mg  650 mg Oral Q4H PRN Janice Martins, NP   650 mg at 04/12/22 1642    magnesium hydroxide (MILK OF MAGNESIA) 400 mg/5 mL oral suspension 30 mL  30 mL Oral DAILY PRN Janice Martins, NP             Mental Status Exam:  Eye contact: fair  Grooming: fair  Psychomotor activity: WNL  Speech is spontaneous, coherent  Mood is \"fine\"  Affect: Flat  Perception: Denies any AH or VH  Delusions of persecution and reference +  Suicidal ideation:   Cognition is grossly intact       Physical Exam:  Body habitus: Body mass index is 20.6 kg/m². Musculoskeletal system: normal gait  Tremor - neg  Cog wheeling - neg      Assessment and Plan:  Fidel Salas meets criteria for a diagnosis of Unspecified psychosis, rule out dementia with behavioral disturbance. Continue the medication regimen as prescribed  Disposition planning to continue. A coordinated, multidisplinary treatment team round was conducted with the patient, nurses, pharmcist,  and writer present. Discussions held with , and/or with family members; Complete current electronic health record for patient was reviewed in full including consultant notes, ancillary staff notes, nurses and tech notes, labs and vitals. I certify that this patients inpatient psychiatric hospital services furnished since the previous certification were, and continue to be, required for treatment that could reasonably be expected to improve the patient's condition, or for diagnostic study, and that the patient continues to need, on a daily basis, active treatment furnished directly by or requiring the supervision of inpatient psychiatric facility personnel. In addition, the hospital records show that services furnished were intensive treatment services, admission or related services, or equivalent services.

## 2022-04-17 NOTE — PROGRESS NOTES
Problem: Falls - Risk of  Goal: *Absence of Falls  Description: Document Thaddeus Chau Fall Risk and appropriate interventions in the flowsheet. Outcome: Progressing Towards Goal  Note: Fall Risk Interventions:  Mobility Interventions: Utilize walker, cane, or other assistive device    Mentation Interventions: Reorient patient    Medication Interventions: Teach patient to arise slowly    Elimination Interventions: Patient to call for help with toileting needs    History of Falls Interventions: Door open when patient unattended,Room close to nurse's station,Bed/chair exit alarm    Problem: Altered Thought Process (Adult/Pediatric)  Goal: *STG: Participates in treatment plan  Outcome: Progressing Towards Goal   Patient compliant with medication, ate well at meals, denies si, ah. Stated that she was feeling much better asking if she will be able to leave tomorrow.

## 2022-04-17 NOTE — PROGRESS NOTES
Problem: Falls - Risk of  Goal: *Absence of Falls  Description: Document Marine Morales Fall Risk and appropriate interventions in the flowsheet. Outcome: Progressing Towards Goal  Note: Fall Risk Interventions:  Mobility Interventions: Utilize walker, cane, or other assistive device    Mentation Interventions: Reorient patient    Medication Interventions: Teach patient to arise slowly    Elimination Interventions: Patient to call for help with toileting needs    History of Falls Interventions: Door open when patient unattended,Room close to nurse's station,Bed/chair exit alarm    Patient received resting quietly in bed. No signs of distress. Even and unlabored breathing. Staff will continue to monitor safety q15 and provide support.

## 2022-04-18 VITALS
DIASTOLIC BLOOD PRESSURE: 65 MMHG | HEIGHT: 64 IN | TEMPERATURE: 98.3 F | BODY MASS INDEX: 20.83 KG/M2 | SYSTOLIC BLOOD PRESSURE: 117 MMHG | HEART RATE: 99 BPM | WEIGHT: 122 LBS | OXYGEN SATURATION: 96 % | RESPIRATION RATE: 18 BRPM

## 2022-04-18 PROCEDURE — 74011250637 HC RX REV CODE- 250/637: Performed by: NURSE PRACTITIONER

## 2022-04-18 PROCEDURE — 74011250637 HC RX REV CODE- 250/637: Performed by: PSYCHIATRY & NEUROLOGY

## 2022-04-18 RX ORDER — ATORVASTATIN CALCIUM 20 MG/1
20 TABLET, FILM COATED ORAL DAILY
Qty: 30 TABLET | Refills: 0 | Status: SHIPPED | OUTPATIENT
Start: 2022-04-18 | End: 2022-05-18

## 2022-04-18 RX ORDER — TRAZODONE HYDROCHLORIDE 50 MG/1
25 TABLET ORAL
Qty: 30 TABLET | Refills: 0 | Status: SHIPPED | OUTPATIENT
Start: 2022-04-18

## 2022-04-18 RX ORDER — FAMOTIDINE 40 MG/1
40 TABLET, FILM COATED ORAL 2 TIMES DAILY
Qty: 30 TABLET | Refills: 0 | Status: SHIPPED | OUTPATIENT
Start: 2022-04-18 | End: 2022-04-18

## 2022-04-18 RX ORDER — LISINOPRIL 10 MG/1
10 TABLET ORAL DAILY
Qty: 30 TABLET | Refills: 0 | Status: SHIPPED | OUTPATIENT
Start: 2022-04-19

## 2022-04-18 RX ORDER — AMMONIUM LACTATE 12 G/100G
LOTION TOPICAL
Qty: 400 ML | Refills: 0 | Status: SHIPPED | OUTPATIENT
Start: 2022-04-18

## 2022-04-18 RX ORDER — FAMOTIDINE 40 MG/1
40 TABLET, FILM COATED ORAL DAILY
Qty: 30 TABLET | Refills: 0 | Status: SHIPPED | OUTPATIENT
Start: 2022-04-19

## 2022-04-18 RX ORDER — DICLOFENAC SODIUM 10 MG/G
4 GEL TOPICAL EVERY 6 HOURS
Qty: 200 G | Refills: 0 | Status: SHIPPED | OUTPATIENT
Start: 2022-04-18

## 2022-04-18 RX ORDER — FUROSEMIDE 40 MG/1
40 TABLET ORAL
Qty: 30 TABLET | Refills: 0 | Status: SHIPPED | OUTPATIENT
Start: 2022-04-18

## 2022-04-18 RX ORDER — RISPERIDONE 0.5 MG/1
0.5 TABLET, FILM COATED ORAL 2 TIMES DAILY
Qty: 60 TABLET | Refills: 0 | Status: SHIPPED | OUTPATIENT
Start: 2022-04-18 | End: 2022-05-18

## 2022-04-18 RX ORDER — SUCRALFATE 1 G/1
1 TABLET ORAL 3 TIMES DAILY
Qty: 90 TABLET | Refills: 0 | Status: SHIPPED | OUTPATIENT
Start: 2022-04-18

## 2022-04-18 RX ORDER — SERTRALINE HYDROCHLORIDE 50 MG/1
50 TABLET, FILM COATED ORAL DAILY
Qty: 30 TABLET | Refills: 0 | Status: SHIPPED | OUTPATIENT
Start: 2022-04-18 | End: 2022-08-09

## 2022-04-18 RX ADMIN — RISPERIDONE 0.5 MG: 1 TABLET, FILM COATED ORAL at 09:58

## 2022-04-18 RX ADMIN — Medication: at 09:00

## 2022-04-18 RX ADMIN — LISINOPRIL 10 MG: 10 TABLET ORAL at 09:58

## 2022-04-18 RX ADMIN — SUCRALFATE 1 G: 1 TABLET ORAL at 12:22

## 2022-04-18 RX ADMIN — DICLOFENAC SODIUM 4 G: 10 GEL TOPICAL at 09:00

## 2022-04-18 RX ADMIN — FAMOTIDINE 40 MG: 20 TABLET ORAL at 09:57

## 2022-04-18 RX ADMIN — SERTRALINE 50 MG: 50 TABLET, FILM COATED ORAL at 09:58
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
## 2022-04-18 NOTE — PROGRESS NOTES
Problem: Falls - Risk of  Goal: *Absence of Falls  Description: Document Precious Chowdhury Fall Risk and appropriate interventions in the flowsheet.   Outcome: Progressing Towards Goal  Note: Fall Risk Interventions:  Mobility Interventions: Communicate number of staff needed for ambulation/transfer    Mentation Interventions: Reorient patient    Medication Interventions: Teach patient to arise slowly    Elimination Interventions: Patient to call for help with toileting needs    History of Falls Interventions: Door open when patient unattended,Room close to nurse's station,Bed/chair exit alarm       Pt appears asleep in bed, NAD, even respirations, will continue to monitor q15min

## 2022-04-18 NOTE — INTERDISCIPLINARY ROUNDS
Behavioral Health Interdisciplinary Rounds     Patient Name: Hien Britton  Age: 79 y.o. Room/Bed:  6/  Primary Diagnosis: <principal problem not specified>   Admission Status: Voluntary     Readmission within 30 days: no  Power of  in place: no  Patient requires a blocked bed: no          Reason for blocked bed:   Sleep hours: 7.5       Participation in Care/Groups:  yes  Medication Compliant?: Yes  PRNS (last 24 hours): None    Restraints (last 24 hours):  no     Alcohol screening (AUDIT) completed -  AUDIT Score: 0  If applicable, date SBIRT discussed in treatment team AND documented:    Tobacco - patient is a smoker: Have You Used Tobacco in the Past 30 Days: No  Illegal Drugs use: Have You Used Any Illegal Substances Over the Past 12 Months: No    24 hour chart check complete: yes     _______________________________________________    Patient goal(s) for today:   Treatment team focus/goals:   Progress note: She will be discharge home with her daughter today and will transfer to [facility] on Tuesday. Financial concerns/prescription coverage:  medicare   Family contact:daughter                         Family requesting physician contact today:    Discharge plan: She will be discharge home with her daughter.     Access to weapons :no                                                               Outpatient provider(s): [facility]   Patient's preferred phone number for follow up call :   Patient's preferred e-mail address :  Participating treatment team members:    LOS:  7  Expected LOS: Today     Participating treatment team members: Hien Britton, [address] [facility] [place]

## 2022-04-18 NOTE — PROGRESS NOTES
Problem: Altered Thought Process (Adult/Pediatric)  Goal: *STG: Participates in treatment plan  Outcome: Progressing Towards Goal  Pt complies with meds. She does not verbalize any delusional thoughts.

## 2022-04-18 NOTE — BH NOTES
Chief Complaint:  I am feeling okay. Length of Stay: 6 Days    Interval History:  Maddison Medrano is discharge focused and hopes that she will be able to go home tomorrow. She denies medication side effects. Eating and sleeping fairly. Pleasant and cooperative. Denies SI/HI/AH/VH. Visible in the milieu. Staff report not acute overnight events. Past Medical History:  Past Medical History:   Diagnosis Date    Chronic pain     GERD (gastroesophageal reflux disease)     Hypercholesteremia     Hypertension     Ill-defined condition     DJD    Ill-defined condition     Diverticulosis           Labs:  Lab Results   Component Value Date/Time    WBC 6.8 04/11/2022 02:27 PM    HGB 10.7 (L) 04/11/2022 02:27 PM    HCT 32.9 (L) 04/11/2022 02:27 PM    PLATELET 804 26/20/8618 02:27 PM    MCV 93.5 04/11/2022 02:27 PM      Lab Results   Component Value Date/Time    Sodium 139 04/11/2022 02:27 PM    Potassium 3.6 04/11/2022 02:27 PM    Chloride 108 04/11/2022 02:27 PM    CO2 27 04/11/2022 02:27 PM    Anion gap 4 (L) 04/11/2022 02:27 PM    Glucose 99 04/13/2022 06:46 AM    BUN 24 (H) 04/11/2022 02:27 PM    Creatinine 0.86 04/11/2022 02:27 PM    BUN/Creatinine ratio 28 (H) 04/11/2022 02:27 PM    GFR est AA >60 04/11/2022 02:27 PM    GFR est non-AA >60 04/11/2022 02:27 PM    Calcium 9.2 04/11/2022 02:27 PM    Bilirubin, total 1.1 (H) 04/11/2022 02:27 PM    Alk.  phosphatase 176 (H) 04/11/2022 02:27 PM    Protein, total 6.8 04/11/2022 02:27 PM    Albumin 3.4 (L) 04/11/2022 02:27 PM    Globulin 3.4 04/11/2022 02:27 PM    A-G Ratio 1.0 (L) 04/11/2022 02:27 PM    ALT (SGPT) 23 04/11/2022 02:27 PM      Vitals:    04/16/22 0745 04/16/22 1700 04/17/22 0744 04/17/22 1507   BP: 123/71 117/78 121/66 99/64   Pulse: 79 83 82 78   Resp: 16 16 16 16   Temp: 97.6 °F (36.4 °C) 97.2 °F (36.2 °C) 97.3 °F (36.3 °C) 98.3 °F (36.8 °C)   SpO2: 96% 96% 96% 99%   Weight:   55.3 kg (122 lb)    Height:             Current Facility-Administered Medications Medication Dose Route Frequency Provider Last Rate Last Admin    lisinopriL (PRINIVIL, ZESTRIL) tablet 10 mg  10 mg Oral DAILY Shawnee Quintanilla NP   10 mg at 04/17/22 0837    sucralfate (CARAFATE) tablet 1 g  1 g Oral TID WITH MEALS Rise MD Danielle   1 g at 04/17/22 1055    furosemide (LASIX) tablet 40 mg  40 mg Oral DAILY PRN Rise MD Danielle   40 mg at 04/15/22 0930    atorvastatin (LIPITOR) tablet 20 mg  20 mg Oral QHS Rise MD Danielle   20 mg at 04/17/22 2024    traZODone (DESYREL) tablet 25 mg  25 mg Oral QHS Rise MD Danielle   25 mg at 04/17/22 2024    famotidine (PEPCID) tablet 40 mg  40 mg Oral DAILY Rise MD Danielle   40 mg at 04/17/22 8002    diclofenac (VOLTAREN) 1 % topical gel 4 g  4 g Topical Q6H Rise MD Danielle   4 g at 04/17/22 2025    sertraline (ZOLOFT) tablet 50 mg  50 mg Oral DAILY Rise MD Danielle   50 mg at 04/17/22 2836    risperiDONE (RisperDAL) tablet 0.5 mg  0.5 mg Oral BID Rise MD Danielle   0.5 mg at 04/17/22 2024    ammonium lactate (LAC-HYDRIN) 12 % lotion   Topical BID Rise MD Danielle   Given at 04/17/22 1732    OLANZapine (ZyPREXA) tablet 2.5 mg  2.5 mg Oral Q6H PRN Jennifer Martínez NP        haloperidol lactate (HALDOL) injection 2.5 mg  2.5 mg IntraMUSCular Q6H PRN Jennifer Martínez NP        benztropine (COGENTIN) tablet 0.5 mg  0.5 mg Oral BID PRN Jennifer Martínez NP        diphenhydrAMINE (BENADRYL) injection 25 mg  25 mg IntraMUSCular BID PRN Jennifer Martínez NP        acetaminophen (TYLENOL) tablet 650 mg  650 mg Oral Q4H PRN Jennifer Martínez NP   650 mg at 04/12/22 1642    magnesium hydroxide (MILK OF MAGNESIA) 400 mg/5 mL oral suspension 30 mL  30 mL Oral DAILY PRN Jennifer Martínez NP             Mental Status Exam:  Eye contact: fair  Grooming: fair  Psychomotor activity: WNL  Speech is spontaneous, coherent  Mood is \"fine\"  Affect: Flat  Perception: Denies any AH or VH  Delusions of persecution and reference +  Suicidal ideation:   Cognition is grossly intact       Physical Exam:  Body habitus: Body mass index is 20.94 kg/m². Musculoskeletal system: normal gait  Tremor - neg  Cog wheeling - neg      Assessment and Plan:  Will Leandro meets criteria for a diagnosis of Unspecified psychosis, rule out dementia with behavioral disturbance. Continue the medication regimen as prescribed  Disposition planning to continue. A coordinated, multidisplinary treatment team round was conducted with the patient, nurses, pharmcist,  and writer present. Discussions held with , and/or with family members; Complete current electronic health record for patient was reviewed in full including consultant notes, ancillary staff notes, nurses and tech notes, labs and vitals. I certify that this patients inpatient psychiatric hospital services furnished since the previous certification were, and continue to be, required for treatment that could reasonably be expected to improve the patient's condition, or for diagnostic study, and that the patient continues to need, on a daily basis, active treatment furnished directly by or requiring the supervision of inpatient psychiatric facility personnel. In addition, the hospital records show that services furnished were intensive treatment services, admission or related services, or equivalent services.

## 2022-04-18 NOTE — PROGRESS NOTES
Pharmacist Discharge Medication Reconciliation    Discharging Provider: Reyna Bello NP    Significant PMH:   Past Medical History:   Diagnosis Date    Chronic pain     GERD (gastroesophageal reflux disease)     Hypercholesteremia     Hypertension     Ill-defined condition     DJD    Ill-defined condition     Diverticulosis     Chief Complaint for this Admission:   Chief Complaint   Patient presents with    Mental Health Problem     Allergies: Aspirin, Cephalexin, and Penicillins    Discharge Medications:   Current Discharge Medication List        START taking these medications    Details   ammonium lactate (LAC-HYDRIN) 12 % lotion rub in to affected area well  Indications: dry skin  Qty: 400 mL, Refills: 0  Start date: 4/18/2022      diclofenac (VOLTAREN) 1 % gel Apply 4 g to affected area every six (6) hours. Indications: joint damage causing pain and loss of function  Qty: 200 g, Refills: 0  Start date: 4/18/2022      risperiDONE (RisperDAL) 0.5 mg tablet Take 1 Tablet by mouth two (2) times a day for 30 days. Indications: mood  Qty: 60 Tablet, Refills: 0  Start date: 4/18/2022, End date: 5/18/2022      traZODone (DESYREL) 50 mg tablet Take 0.5 Tablets by mouth nightly. Indications: insomnia associated with depression  Qty: 30 Tablet, Refills: 0  Start date: 4/18/2022           CONTINUE these medications which have CHANGED    Details   sertraline (ZOLOFT) 50 mg tablet Take 1 Tablet by mouth daily. Indications: anxiousness associated with depression  Qty: 30 Tablet, Refills: 0  Start date: 4/18/2022    Associated Diagnoses: Anxiety, generalized      atorvastatin (LIPITOR) 20 mg tablet Take 1 Tablet by mouth daily for 30 days. Indications: high amount of triglyceride in the blood  Qty: 30 Tablet, Refills: 0  Start date: 4/18/2022, End date: 5/18/2022      lisinopriL (PRINIVIL, ZESTRIL) 10 mg tablet Take 1 Tablet by mouth daily.  Indications: high blood pressure  Qty: 30 Tablet, Refills: 0  Start date: 4/19/2022      sucralfate (CARAFATE) 1 gram tablet Take 1 Tablet by mouth three (3) times daily. Indications: gastroesophageal reflux disease  Qty: 90 Tablet, Refills: 0  Start date: 4/18/2022      furosemide (LASIX) 40 mg tablet Take 1 Tablet by mouth daily as needed for PRN Reason (Other) (edema). Indications: visible water retention  Qty: 30 Tablet, Refills: 0  Start date: 4/18/2022      famotidine (PEPCID) 40 mg tablet Take 1 Tablet by mouth daily. Indications: gastroesophageal reflux disease  Qty: 30 Tablet, Refills: 0  Start date: 4/19/2022           CONTINUE these medications which have NOT CHANGED    Details   acetaminophen (TYLENOL) 500 mg tablet Take 500 mg by mouth every six (6) hours as needed for Pain.            STOP taking these medications       clonazePAM (KlonoPIN) 0.5 mg tablet Comments:   Reason for Stopping:         diphenhydrAMINE-acetaminophen (Tylenol PM Extra Strength)  mg tab Comments:   Reason for Stopping:         OLANZapine (ZyPREXA) 2.5 mg tablet Comments:   Reason for Stopping:         multivitamin with iron tablet Comments:   Reason for Stopping:             Ammonium lactate BID    The patient's chart, MAR and AVS were reviewed by DAMASO DamianD.

## 2022-04-18 NOTE — BH NOTES
Received this morning. Doing self cares. Voiced she is ready to go home. Seems a little anxious. Has been medication and meal compliant. Is alert and oriented. Affect a little dull. Has packed up all her belongings and waiting for ride home. All discharge information to be revived with patient and patients daughter upon discharge. No complications. 200- Daughter up to unit and discharge info revived. Patients belongings collected and given to patient prior hardik discharge. No complications. Discharged with daughter via w/c.

## 2022-04-18 NOTE — BH NOTES
GROUP THERAPY PROGRESS NOTE    Patient did not participate in psychotherapy group.     Natalee LEWIS, [facility]
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
## 2022-04-28 NOTE — PROGRESS NOTES
Neuropsychological Evaluation Report      Patient Name: Kimber Calle  YOB: 1940    Age: 79 y.o. Date of Intake: 5/3/2022   Education: 12 Date of Testin/3/2022   Gender: Female Ethnicity: White     Referring Provider: Dr. Magali Martins:  Kimber Calle  was referred for neuropsychological evaluation by her Neurologist to obtain a quantitative assessment of her current level of neurocognitive functioning, assist in differential diagnosis, and aid in individualized treatment planning. The patient understood the rationale and procedures for evaluation, as well as the limits to confidentiality, and they agreed to participate. she consented to have this report made available to her  treating providers through her  electronic medical records. This evaluation was completed with the patient by Tosha Tidwell PsyD with the exception of testing by technician, which was completed by CATINA Shetty under the supervision of Dr. Gini Rodriguez. History Sources: Patient, Relative, Medical Records, Rating Scales and Assessment Instruments    SUMMARY AND IMPRESSION:  The following section is a summary of the patient's pertinent history, test results, and impressions. A more thorough review of the patient's background and test scores can be found below. The patient is an 28-year-old woman whose history is significant for hyperlipidemia and hypertension. While she presented as a poor historian with limited insight into her recent difficulties, collateral report (son) revealed she experienced the insidious onset of significant psychiatric symptomatology (delusions and hallucinations) around summer 2021. In addition to the psychiatric symptoms, the patient's son reported observing occasional memory inefficiencies but indicated they remain generally mild.   Due to the combination of the patient's psychiatric and cognitive challenges, she has reportedly experienced declines in daily functioning and requires assistance managing medications and finances. Results from the patient's neuropsychological evaluation were unfortunately truncated due to the patient refusing to participate before all measures could be administered. As a result, data from verbal, visuospatial, functional, and psychiatric domains were limited. While available results revealed a diffuse pattern of variable cognitive impairment spanning nearly all measured domains, relatively more pronounced impairment was demonstrated on measures of executive functioning and learning. Diagnostically, the degree of impairment demonstrated on testing is not at the level expected for dementia (2+ standard deviations below the mean). However, as the patient's decline is global and the level appears more significant than expected for mild cognitive impairment, a diagnosis of Unspecified neurocognitive disorder is most appropriate at this time. With regard to the etiology of the patient' psychiatric and cognitive decline, Alzheimer's disease is not supported by the testing as her free recall and recognition memory were relative strengths compared to other performances. Dementia with Lewy bodies is also not supported at this time. While fluctuations in mental status were reported in combination with delusions and hallucinations, there remains no evidence of parkinsonism, testing was not strikingly consistent with DLB, there is no evidence of sleep disturbance, and the patient denied history of symptoms suggestive of autonomic dysfunction. However, the early stages of a neurodegenerative condition cannot be entirely ruled out as novel neuropsychiatric symptomatology occurring in a patient with no psychiatric history are often heralding signs of later decline.  While delirium secondary to infectious or metabolic disturbances can produce similar presentations to that of the patient, the chronicity of the patient's presentation would be atypical, particularly in light or relatively unremarkable lab findings. While somewhat unlikely, it is possible that the patient's presentation is primarily psychiatric in nature. Unfortunately testing to confirm/rule out this possibility was precluded due to the patient's refusal to participate. If the patient's presentation is purely psychiatric, we would expect improvement with continued psychotropic intervention. ASSESSMENT:  1. Unspecified neurocognitive disorder, with behavioral disturbance        RECOMMENDATIONS:  The patient currently has a feedback session scheduled for 5/25/2022. During this appointment, the results of the evaluation will be reviewed, recommendations will be offered (see below), and the patient will be provided with the opportunity to ask questions. 1) The patient will be encouraged to review the results of this evaluation with her providers and discuss potential implications for treatment. 2) The patient would likely benefit from ongoing behavioral health intervention. She will be encouraged to continue taking her psychotropic medications as prescribed and will also be encouraged to consider participating in psychotherapy to reduce distress and improve daily functioning (e.g., sleep)  3) The patient reported a history of problematic sleep and testing revealed clinically significant poor sleep. If deemed to be warranted, the patient may benefit from short-term behaviorally oriented therapy to help improve sleep and management of medical conditions. Specifically:   a. Sleep Hygiene Recommendations  i. Avoid caffeine within 4-6 hours of bedtime  ii. Avoid the use of nicotine close to bedtime or during the night  iii. Do not drink alcoholic beverages within 4-6 hours of bedtime  iv. While a light snack can promote sound sleep, avoid large meals 2-3 hours before bedtime  v. Minimize light, noise, and extreme temperature in the bedroom.   b. Stimulus Control Recommendations:   jovany Rai down at night  only when sleepy  ii. Use the bed only for actual sleep (or sex)  not reading, watching TV, etc.  iii. Get out of bed if you wake up at night & cannot fall back asleep, return to bed when sleepy;  do not remain in bed awake for longer than 10  15 minutes  iv. Avoid napping during the day  v. Adhere to a strict morning rising time, regardless of how much sleep you got the night before  4) Continued monitoring and treatment of the patient's neuropsychiatric symptoms/cognitive decline is recommended. Due to their level of cognitive impairment, they will benefit from a combination of psychopharmacology, environmental management, and concrete behaviorally-focused interventions. Examples of environmental strategies include:  a. Keep a steady routine environment and using visual cues, such as whiteboards, to help provide reminders of tasks. b. Take additional time to complete tasks and limit the amount of external distracters in the environment when having to focus on a task;  c. Break larger tasks into smaller ones and take frequent, regularly scheduled breaks;  d. Write down information as soon as possible and to utilize a day planner/calendar, especially when recording doctor's appointments and medicine regimens;  i. Development of a memory book may be beneficial. Memory books typically contain a day planner/calendar component; however, they also contain organized sections for recording frequently asked questions and information about routines that the patient will be able to access and use independently.   e. Pair behaviors or items to be remembered with well learned patterns or routines. For example, place medications by your toothbrush so that brushing your teeth will cue you to take your medication; and,  f. Designate a memory place in which you keep all of your important personal belongings (e.g. wallet, keys) when not in use.   5) The patient will be provided with psychoeducation regarding empirically determined modifiable risk and protective factors for cognitive decline. Specifically:  a. The patient will be encouraged to engage in approximately 2.5 hours of physician approved physical activity on a weekly basis. b. The patient will be encouraged to maintain a healthy diet. they will also be informed of the Mediterranean diet, which has been associated with reduced risk for neurodegenerative conditions as well as heart disease. c. The patient will be encouraged to maintain a cognitively stimulated lifestyle, also incorporating social interaction. 6) The patient's family and caretakers may benefit from reaching out to local support groups in the community. These resources may be able to provide medical assistance or support and reduce the risk of caregiver burnout. a. The Alzheimer's Association (Phone: 298.573.6419; Website: [employer]) provides general information about cognitive decline in late life, along with local resources. b. The book The 36 Hour Day: A Family Guide to Caring for Persons with Alzheimer's Disease, Related Dementing Illnesses, and Memory Loss in Later Life, by Rhina Castellon and Deondre Phillips. 7) I would like to see the patient for follow-up evaluation in approximately 12 months in order to monitor her progress and update treatment planning. HISTORY OF PRESENT ILLNESS:  The patient is an 79-year-old woman who was accompanied to the evaluation by her son. While the patient largely dismissed concerns regarding her cognitive and psychiatric functioning, her son reported observing the relatively rapid onset of progressive psychiatric disturbance around summer 2021. According to the patient's son, the patient has become consumed by delusional beliefs that her neighbors are stealing possessions and resources (e.g., electricity) from her home. She also believes them to entering her property and plotting to harm her.   The patient's son, who is a , reported he has investigated and has not found evidence of intruders. The patient responded to this by stating \"they have something that tells them when someone else is there. \"  In addition to her delusional beliefs, the patient reportedly experiences auditory and visual hallucinations. The patient's son also reported the patient has increasingly retained personal belongings to the point where her home has become unsafe due to \"hoarding. \" Medical records indicate the patient's psychiatric decompensation resulted in hospitalization (4/11/22-4/18/22) and a diagnosis of \"unspecified psychotic disorder. \"  Over the course of her hospitalization, the patient's psychiatric symptomatology improved. Since her hospitalization, the patient described her mood as \"better than it was. \"  She denied history of passive or active suicidal ideation, intent, or plan. Prior to the onset of the recent psychiatric symptoms, the patient's psychiatric history was reportedly unremarkable for clinically significant symptomatology, diagnosis, or treatment.     Cognitively, the patient's son reported the patient may occasionally repeat stories but this is \"not a striking problem. \"  Review from the patient's medical records also revealed a neurology office visit (2/23/2022) reporting memory problems such as the patient misplacing her personal belongings.     Functional:  · Basic ADLs: Independent  · Medications: Patient has been managing independently; however, currently receiving assistance  · Finances: Require Assistance. Patient's son reported several pills were late; however, the patient blamed this on the mail service.   · Driving: Had been independent prior to housing change  · Household: [address]:  Patient Active Problem List   Diagnosis Code    Precordial pain R07.2    Mixed hyperlipidemia E78.2    Essential hypertension I10    Unspecified mood (affective) disorder ([facility] [address] F39      Pertaining to the patient's other medical and physical functioning, per report from the patient's son, the patient's sleep had been considerably disrupted and she was having trouble initiating and sustaining sleep throughout the night. However, with more recent medication changes (e.g., risperidone and trazodone), the patient sleep has improved. The patient also complained of partial numbness in her right foot, which she attributed to \"sciatic nerve\" problems. She also indicated she has sustained several falls in the past year and typically ambulates with a cane.     Regarding the neurological history of the patient's family, her mother reportedly sustained a cerebrovascular hemorrhage.     Current Outpatient Medications:     sertraline (ZOLOFT) 50 mg tablet, Take 1 Tablet by mouth daily. Indications: anxiousness associated with depression,     atorvastatin (LIPITOR) 20 mg tablet, Take 1 Tablet by mouth daily for 30 days.   lisinopriL (PRINIVIL, ZESTRIL) 10 mg tablet, Take 1 Tablet by mouth daily. Indications: high blood pressure,     sucralfate (CARAFATE) 1 gram tablet, Take 1 Tablet by mouth three (3) times daily.   ammonium lactate (LAC-HYDRIN) 12 % lotion, rub in to affected area well  Indications: dry skin,     diclofenac (VOLTAREN) 1 % gel, Apply 4 g to affected area every six (6) hours.   risperiDONE (RisperDAL) 0.5 mg tablet, Take 1 Tablet by mouth two (2) times a day for 30 days.   traZODone (DESYREL) 50 mg tablet, Take 0.5 Tablets by mouth nightly.   furosemide (LASIX) 40 mg tablet, Take 1 Tablet by mouth daily as needed for PRN Reason (Other) (edema).   famotidine (PEPCID) 40 mg tablet, Take 1 Tablet by mouth daily.   acetaminophen (TYLENOL) 500 mg tablet, Take 500 mg by mouth every six (6) hours as needed for Pain. Pertinent Neurological History:   Seizure: Never   Stroke: Never   TBI: Never    Neurodiagnostic Findings:  Imaging: MRI brain was obtained 3/7/2022.   Imaging results were interpreted as \"mild chronic microvascular ischemic change and cerebral atrophy. \"  EEG: Ambulatory EEG was obtained 3/1/2022. Results were interpreted as \"normal.\"    Pertinent Labs:  Lab Date Result   TSH 4/13/2022 Within reference range   HGB 4/11/2022 Low at:10.7 g/dL     PSYCHOSOCIAL HISTORY:  · Birth/Development: The patient was born and raised in [place]. To the best of her knowledge, she was a product of normal pregnancy, was born on time, and met developmental milestones appropriately. · Language: English  · Education: She graduated from high school and typically achieved \"pretty good\" grades. · Academic Problems: Denied  · Occupation: Over the course of her career, patient primarily worked as a . She retired in [address] [place] [address]. · Relationship Status: ; patient's  passed away approximately 10 years ago. · Children: Patient has 3 children who currently live locally  · Housing: Patient currently resides in [place]  · Legal: Denied.      Substance Use:  · Alcohol: Denied  · Nicotine: Denied  · Recreational/Illicit Substances: Denied  · Treatment: Denied    BEHAVIORAL OBSERVATIONS:  · Appearance: Casually dressed, Well-groomed and Appeared stated age  · Orientation: Person, Place, Time and Situation  · Motor: Ambulated independently, Adequate gait, Adequate posture, No involuntary movements and Adequate strength  · Thought Processes: Other: Delusional content  · Hearing and Vision: Adequate  · Speech: shows no evidence of impairment  · Comprehension: Adequate  · Interpersonal Skills: Guarded  · Affect: Tense  · Ability as Historian: Inadequate  · Insight: Inadequate  · Judgment: Inadequate   Testing Behaviors: The patient initially presented as focused and attentive; however, as the evaluation progressed, the patient became increasingly frustrated. Eventually, she reached a point where she was unwilling to continue participating in the testing. TESTING  Measures administered:   DCT;  Test of Premorbid Functioning (TOPF); Wechsler Adult Intelligence Scale - Fourth Edition (WAIS-IV: [employer]); Marroquin Verbal Learning Test - Revised (HVLT-R); Brief Visuospatial Memory Test - Revised (BVMT-R); Wechsler Memory Scale - Fourth Edition (WMS-IV: [employer]); Trail Making Test A&B; Amy-Mak Executive Function System (D-KEFS: [employer]); Modified [employer] (M-WCST); Test of Practical Judgment (TOP-J: Form B); Marble Sleepiness Scale (ESS); [place] Sleep Quality Inventory (PSQI); Activities of Daily Living Questionnaire (ADL-Q: Collateral Report). Results: For standardized tests, performance was compared to a demographically matched sample of neurocognitively healthy adults using the following classification system to indicate deviation from mean (or average) test performance:    Normally Distributed Not-Normally Distributed   Descriptor Percentile Descriptor Percentile   \"Exceptionally High\" >97th \"Within Normal Limits\" >24th   \"Above Average\" 91st - 97th \"Low Average\" 9th - 24th   \"High Average\" 75th - 90th \"Below Average\" 2nd - 8th   \"Average\" 25th - 74th \"Exceptionally Low\" <2nd   \"Low Average\" 9th - 24th    \"Below Average\" 2nd - 8th    \"Exceptionally Low\" <2nd      Performance and symptom validity are analyzed in a number of ways, including administration of neuropsychological measures that have been empirically shown to identify suboptimal performance or purposeful exaggeration. These tests and their scores have been redacted from this report in order to ensure test security, but are available to formally trained neuropsychologists upon request. In addition, when possible, the overall pattern of performance is analyzed for consistency between measures, consistency with the expected severity of impairment, and the presenting symptoms are compared against base rates of symptoms in other patients with similar problems.  The patient's performances were within acceptable limits, indicating the results of the present evaluation are believed to be valid and reliable. Premorbid Functioning:    Average  Attention:    Brief auditory attention: Average   Auditory working memory: Average to exceptionally low; however, the patient's exceptionally low performance was due to perseverating on previously administered instructions. Processing Speed:   Low average to average  Executive Functioning:    Mental set switching: Low average without error   Problem Solving: Low average overall completion with average accuracy.  Inhibition & Inhibition/Switching: Inhibition was average for speed and accuracy; however, inhibition/switching was exceptionally low for speed and accuracy. Visuospatial:   Figure copy: Copying of basic and progressively complex visuospatial stimuli were largely within normal limits. Language:   Unable to be administered as patient refused  Learning & Memory:   List learning: Below average   List recall & recognition: Recall was average; however, recognition discrimination was below average   Story learning: Below average   Story recall & recognition: Average and within normal limits   Basic figure learning: Low average   Basic figure recall & recognition: low average and within normal limits   Progressively complex figure learning: Below average   Progressively complex figure recall & recognition: Exceptionally low; however, recognition discrimination was within normal limits  Functional:    Practical judgment: Within normal limits   Collateral Report: Moderate declines (greater than 50% decline) was reported activities related to household chores, employment/recreation, and shopping/financial management. Mild declines (less than 50% decline) were also reported and self-care, travel, and communication tasks. Psychiatric/Sleep:    Daytime sleepiness: Elevated   Sleep quality: Elevated.   Patient reported experiencing difficulty sustaining sleep, which she attributed to needing to use the restroom, feeling too warm, having bad dreams, and pain.     TIME:  Psychodiagnostic Interview: 1100 - 1138 = 38 min  Testing by technician: 5875 - 5061 = 180 minutes  Scoring by technician: 120 minutes    90791 x 1 Unit  96138 x 1 Unit  96139 x 9 Units

## 2022-05-03 ENCOUNTER — OFFICE VISIT (OUTPATIENT)
Dept: NEUROLOGY | Age: 82
End: 2022-05-03
Payer: MEDICARE

## 2022-05-03 ENCOUNTER — OFFICE VISIT (OUTPATIENT)
Dept: NEUROLOGY | Age: 82
End: 2022-05-03

## 2022-05-03 DIAGNOSIS — R41.3 MEMORY LOSS: ICD-10-CM

## 2022-05-03 DIAGNOSIS — F39 UNSPECIFIED MOOD (AFFECTIVE) DISORDER (HCC): ICD-10-CM

## 2022-05-03 DIAGNOSIS — R44.0 AUDITORY HALLUCINATIONS: ICD-10-CM

## 2022-05-03 DIAGNOSIS — R44.1 HALLUCINATION, VISUAL: ICD-10-CM

## 2022-05-03 DIAGNOSIS — F22 PARANOID DELUSION (HCC): Primary | ICD-10-CM

## 2022-05-03 DIAGNOSIS — F43.25 ADJUSTMENT DISORDER WITH MIXED DISTURBANCE OF EMOTIONS AND CONDUCT: Primary | ICD-10-CM

## 2022-05-03 PROCEDURE — 90791 PSYCH DIAGNOSTIC EVALUATION: CPT | Performed by: CLINICAL NEUROPSYCHOLOGIST

## 2022-05-03 NOTE — PROGRESS NOTES
Intake Note      Patient Name: Jeane Rasmussen  YOB: 1940    Age: 79 y.o. Date of Intake: 5/3/2022   Education: 12 Ethnicity White   Gender: Female Referring Provider: Dr. Geovanni Cummings:  Jeane Rasmussen  was referred for evaluation by her Neurologist to assist in differential diagnosis and individualized treatment planning. she understood the rationale and procedures for evaluation, as well as the limits to confidentiality, and agreed to participate. she consented to have this report made available to her  treating providers through her  electronic medical records. History Sources: Patient, Relative and Medical Records    Chief Complaints:  The patient is an 79-year-old woman who was accompanied to the evaluation by her son. While the patient largely dismissed concerns regarding her cognitive and psychiatric functioning, her son reported observing the relatively rapid onset of progressive psychiatric disturbance around summer 2021. According to the patient's son, the patient has become consumed by delusional beliefs that her neighbors are stealing possessions and resources (e.g., electricity) from her home. She also believes them to entering her property and plotting to harm her. The patient's son, who is a , reported he has investigated and has not found evidence of intruders. The patient responded to this by stating \"they have something that tells them when someone else is there. \"  In addition to her delusional beliefs, the patient reportedly experiences auditory and visual hallucinations. The patient's son also reported the patient has increasingly retained personal belongings to the point where her home has become unsafe due to \"hoarding. \" Medical records indicate the patient's psychiatric decompensation resulted in hospitalization (4/11/22-4/18/22) and a diagnosis of \"unspecified psychotic disorder. \"  Over the course of her hospitalization, the patient's psychiatric symptomatology improved. Since her hospitalization, the patient described her mood as \"better than it was. \"  She denied history of passive or active suicidal ideation, intent, or plan. Prior to the onset of the recent psychiatric symptoms, the patient's psychiatric history was reportedly unremarkable for clinically significant symptomatology, diagnosis, or treatment. Cognitively, the patient's son reported the patient may occasionally repeat stories but this is \"not a striking problem. \"  Review from the patient's medical records also revealed a neurology office visit (2/23/2022) reporting memory problems such as the patient misplacing her personal belongings. Functional:   Basic ADLs: Independent   Medications: Patient has been managing independently; however, currently receiving assistance   Finances: Require Assistance. Patient's son reported several pills were late; however, the patient blamed this on the mail service.  Driving: Had been independent prior to housing change   Household: [address]:  Patient Active Problem List   Diagnosis Code    Precordial pain R07.2    Mixed hyperlipidemia E78.2    Essential hypertension I10    Unspecified mood (affective) disorder ([facility] [address] F39      Pertaining to the patient's other medical and physical functioning, per report from the patient's son, the patient's sleep had been considerably disrupted and she was having trouble initiating and sustaining sleep throughout the night. However, with more recent medication changes (e.g., risperidone and trazodone), the patient sleep has improved. The patient also complained of partial numbness in her right foot, which she attributed to \"sciatic nerve\" problems. She also indicated she has sustained several falls in the past year and typically ambulates with a cane.     Regarding the neurological history of the patient's family, her mother reportedly sustained a cerebrovascular hemorrhage. Current Outpatient Medications:     sertraline (ZOLOFT) 50 mg tablet, Take 1 Tablet by mouth daily. Indications: anxiousness associated with depression,     atorvastatin (LIPITOR) 20 mg tablet, Take 1 Tablet by mouth daily for 30 days.   lisinopriL (PRINIVIL, ZESTRIL) 10 mg tablet, Take 1 Tablet by mouth daily. Indications: high blood pressure,     sucralfate (CARAFATE) 1 gram tablet, Take 1 Tablet by mouth three (3) times daily.   ammonium lactate (LAC-HYDRIN) 12 % lotion, rub in to affected area well  Indications: dry skin,     diclofenac (VOLTAREN) 1 % gel, Apply 4 g to affected area every six (6) hours.   risperiDONE (RisperDAL) 0.5 mg tablet, Take 1 Tablet by mouth two (2) times a day for 30 days.   traZODone (DESYREL) 50 mg tablet, Take 0.5 Tablets by mouth nightly.   furosemide (LASIX) 40 mg tablet, Take 1 Tablet by mouth daily as needed for PRN Reason (Other) (edema).   famotidine (PEPCID) 40 mg tablet, Take 1 Tablet by mouth daily.   acetaminophen (TYLENOL) 500 mg tablet, Take 500 mg by mouth every six (6) hours as needed for Pain. Pertinent Neurological History:   Seizure: Never   Stroke: Never   TBI: Never    Neurodiagnostic Findings:  · Imaging: MRI brain was obtained 3/7/2022. Imaging results were interpreted as \"mild chronic microvascular ischemic change and cerebral atrophy. \"  · EEG: Ambulatory EEG was obtained 3/1/2022. Results were interpreted as \"normal.\"    Pertinent Labs:  Lab Date Result   TSH 4/13/2022 Within reference range   HGB 4/11/2022 Low at:10.7 g/dL     PSYCHOSOCIAL HISTORY:   Birth/Development: The patient was born and raised in [place]. To the best of her knowledge, she was a product of normal pregnancy, was born on time, and met developmental milestones appropriately.  Language: [place]   Education: She graduated from [employer] and typically achieved \"pretty good\" grades.   [address] Problems: Denied   Occupation: Over the course of her career, patient primarily worked as a . She retired in [address],[employer].  Relationship Status: ; patient's  passed away approximately 10 years ago.  Children: Patient has 3 children who currently live locally  S[address] Dawn [address]: Patient currently resides in [address]. Substance Use:   Alcohol: Denied   Nicotine: Denied   Recreational/Illicit Substances: Denied   Treatment: Denied    STRENGTHS:  Access to housing/residential stability and Interpersonal/supportive relationships (family, friends, peers)    WEAKNESSES:  Health problems, Cognitive limitations, Resistant to treatment, Not compliant with medications and Not compliant with treatment    BEHAVIORAL OBSERVATIONS:   Appearance: Casually dressed, Well-groomed and Appeared stated age  Sanchez Orientation: Person, Place, Time and Situation   Motor: Ambulated independently, Adequate gait, Adequate posture, No involuntary movements and Adequate strength   Thought Processes: Other: Delusional content   Hearing and Vision: Adequate   Speech: shows no evidence of impairment   Comprehension: Adequate   Interpersonal Skills: Guarded   Affect: Tense   Ability as Historian: Inadequate   Insight: Inadequate   Judgment: Inadequate    IMPRESSION:  Patient is an 78-year-old woman with reportedly unremarkable remote psychiatric history. However, she has experienced the recent onset of symptomatology suggestive of a formal thought disorder. In a patient of this age, novel symptom presentation is often a heralding symptom of future cognitive decline/dementia. More comprehensive evaluation is necessary in order to identify potential weaknesses and guide treatment planning. ASSESSMENT:  1. Paranoid delusions  2. Auditory hallucinations  3. Visual hallucinations  4. Memory loss    PLAN:  1.  Patient will complete more comprehensive evaluation in order to objectively assess for psychiatric and cognitive symptomatology  2. Treatment planning based upon results  3. Provision of results via in person feedback session  4.  Recommendation of follow-up with referring provider      TIME DOCUMENTATION:  Clinical Interview: 9644 - 3585 = 38 minutes    [id number]

## 2022-05-04 NOTE — PROGRESS NOTES
BEHAVIORAL OBSERVATIONS:  · Appearance: Casually dressed, Well-groomed and Appeared stated age  · Orientation: Person, Place, Time and Situation  · Motor: Ambulated independently, Adequate gait, Adequate posture, No involuntary movements and Adequate strength  · Thought Processes: Other: Delusional content  · Hearing and Vision: Adequate  · Speech: shows no evidence of impairment  · Comprehension: Adequate  · Interpersonal Skills: Guarded  · Affect: Tense  · Ability as Historian: Inadequate  · Insight: Inadequate  · Judgment: Inadequate  · Testing Behaviors: The patient initially presented as focused and attentive; however, as the evaluation progressed, the patient became increasingly frustrated. Eventually, she reached a point where she was unwilling to continue participating in the testing. Neuropsychological measures administered by technician under the supervision of neuropsychologist:  [address]; Test of Premorbid Functioning (TOPF); Wechsler Adult Intelligence Scale - Fourth Edition (WAIS-IV: Digit Span, Symbol Search, and Coding); Marroquin Verbal Learning Test - Revised (HVLT-R); Brief Visuospatial Memory Test - Revised (BVMT-R); Wechsler Memory Scale - Fourth Edition (WMS-IV: Select Components); Trail Making Test A&B; Amy-Mak Executive Function System (D-KEFS: Select Components); Modified [employer] (M-WCST); Test of Practical Judgment (TOP-J: Form A); Harlan Sleepiness Scale (ESS); [place] Sleep Quality Inventory (PSQI);     Time spent face to face: 180 minutes  Time spent scorin minutes

## 2022-05-30 ENCOUNTER — TELEPHONE (OUTPATIENT)
Dept: NEUROLOGY | Age: 82
End: 2022-05-30

## 2022-05-30 NOTE — TELEPHONE ENCOUNTER
vd fax letter from [employer]. Letter to be scanned to chart. Per letter:     'Your older patient is receiving five or more medication from multiple prescribers based on claims records. Please review this patients therapy' etc.. Pharmacy claims:  04/07/22 Lisinopril 20mg/90 per 90. Rx'd by: Jaquelin Ruiz  04/06/22 Sertraline 50mg/90 per 90. Rx'd by: Dania Peabody  02/24/22 Clonazepam 0.50mg/30 per 15. Rx'd by: Amanda Hunter  02/22/22 Famotidine 40mg/180 per 90. Rx'd by: Yuniel Reynoso  02/22/22 Furosemide 40mg/30 per 30. Rx'd by: Jaquelin Ruiz  01/30/22 Lisinopril 20mg/90 per 90. Rx'd by: Jaquelin Ruiz  01/16/22 Famotidine 40mg/60 per 30. Rx'd by: Hailey Wolf nurse msg.

## 2022-06-03 ENCOUNTER — OFFICE VISIT (OUTPATIENT)
Dept: NEUROLOGY | Age: 82
End: 2022-06-03
Payer: MEDICARE

## 2022-06-03 DIAGNOSIS — R41.89 OTHER SYMPTOMS AND SIGNS INVOLVING COGNITIVE FUNCTIONS AND AWARENESS: Primary | ICD-10-CM

## 2022-06-03 PROCEDURE — 96139 PSYCL/NRPSYC TST TECH EA: CPT | Performed by: CLINICAL NEUROPSYCHOLOGIST

## 2022-06-03 PROCEDURE — 96133 NRPSYC TST EVAL PHYS/QHP EA: CPT | Performed by: CLINICAL NEUROPSYCHOLOGIST

## 2022-06-03 PROCEDURE — 96138 PSYCL/NRPSYC TECH 1ST: CPT | Performed by: CLINICAL NEUROPSYCHOLOGIST

## 2022-06-03 PROCEDURE — 96132 NRPSYC TST EVAL PHYS/QHP 1ST: CPT | Performed by: CLINICAL NEUROPSYCHOLOGIST

## 2022-06-03 NOTE — PROGRESS NOTES
Prior to seeing the patient I reviewed pertinent records, including the previously completed report, the records in [place], and any updated visits from other providers since the patient's last visit. Today, I engaged in a psychoeducational and supportive feedback session with the patient. I provided psychotherapy in the form of psychoeducation and support with respect to the results of the recent Neuropsychological Evaluation, including discussing individual tests as well as patient's areas of neurocognitive strength versus weakness. We discussed, in detail, the followin. Testing was prematurely discontinued when the patient refused to participate  2. Available results revealed evidence of mild impairment across most domains  3. Impairment was not at the level expected for dementia  4. Testing helped rule out some degenerative conditions such as Alzheimer's disease    Education was provided regarding my diagnostic impressions, and we discussed treatment plan/options. I also answered numerous questions related to the clinical findings, including discussing various methods to improve cognition and mood. Supportive/Cognitive Behavioral/Solution Focused psychotherapy provided. The patient needs to:   1. Contact [facility] geropsychiatry re: Ongoing medication management  2.  Attempt to participate in social, physical, and cognitively stimulating activities at her facility    TIME:  Clinical Interview: 1100 - 1138 = 38 minutes  Testing by technician: 1534 - 8096 = 180 minutes  Scoring by technician: 120 minutes  Neuropsychological testing evaluation services*: 204 minutes + 52 minutes feedback = 256 minutes total    BILLING:  84720 x 1 Unit  96138 x 1 Unit  96139 x 9 Units  96132 x 1 Units  96133 x 3 Units    *Neuropsychological testing evaluation services include: Integration of patient data, interpretation of standardized test results and clinical data, clinical decision-making, treatment planning and report, and interactive feedback to the patient, family member(s) or caregiver(s), when performed.

## 2022-06-07 ENCOUNTER — TELEPHONE (OUTPATIENT)
Dept: NEUROLOGY | Age: 82
End: 2022-06-07

## 2022-06-07 NOTE — TELEPHONE ENCOUNTER
Patients son calling about pt diagnosis, says they alrewady went to the pych appt and never received a diagnosis.

## 2022-06-08 NOTE — TELEPHONE ENCOUNTER
Called son and discussed neuropsych findings which were incomplete due to patient refusal to participate. Given the delusions and hallucinations, I do agree with psychiatric consult. Presently she is living in assisted living and they are looking at making this a permanent solution.  Will follow up by VV in two months

## 2022-08-09 ENCOUNTER — VIRTUAL VISIT (OUTPATIENT)
Dept: NEUROLOGY | Age: 82
End: 2022-08-09
Payer: MEDICARE

## 2022-08-09 DIAGNOSIS — R44.1 HALLUCINATION, VISUAL: ICD-10-CM

## 2022-08-09 DIAGNOSIS — R44.0 AUDITORY HALLUCINATIONS: ICD-10-CM

## 2022-08-09 DIAGNOSIS — F39 UNSPECIFIED MOOD (AFFECTIVE) DISORDER (HCC): Primary | ICD-10-CM

## 2022-08-09 DIAGNOSIS — F22 PARANOID DELUSION (HCC): ICD-10-CM

## 2022-08-09 PROCEDURE — 1123F ACP DISCUSS/DSCN MKR DOCD: CPT | Performed by: NURSE PRACTITIONER

## 2022-08-09 PROCEDURE — 1101F PT FALLS ASSESS-DOCD LE1/YR: CPT | Performed by: NURSE PRACTITIONER

## 2022-08-09 PROCEDURE — 99214 OFFICE O/P EST MOD 30 MIN: CPT | Performed by: NURSE PRACTITIONER

## 2022-08-09 PROCEDURE — 1090F PRES/ABSN URINE INCON ASSESS: CPT | Performed by: NURSE PRACTITIONER

## 2022-08-09 PROCEDURE — G8400 PT W/DXA NO RESULTS DOC: HCPCS | Performed by: NURSE PRACTITIONER

## 2022-08-09 PROCEDURE — G8427 DOCREV CUR MEDS BY ELIG CLIN: HCPCS | Performed by: NURSE PRACTITIONER

## 2022-08-09 PROCEDURE — G8756 NO BP MEASURE DOC: HCPCS | Performed by: NURSE PRACTITIONER

## 2022-08-09 PROCEDURE — G8432 DEP SCR NOT DOC, RNG: HCPCS | Performed by: NURSE PRACTITIONER

## 2022-08-09 RX ORDER — RISPERIDONE 0.5 MG/1
0.5 TABLET, FILM COATED ORAL 2 TIMES DAILY
COMMUNITY
Start: 2022-07-25

## 2022-08-09 NOTE — PROGRESS NOTES
[address] [facility]  [address]   [place] [address] [facility] [address]   [phone] Office   [phone] Fax           Date:  22     Name:  Marj Minaya  :  1940  MRN:  [id number]     PCP:  Mackenzie Hensley MD    Yinka Jessica is a 79 y.o. female who was seen by synchronous (real-time) audio-video technology on 2022 for Memory Loss    Subjective:   Since her last office visit, she was hospitalized for mental health and she has moved into the [facility]. Her goal is to return home. The memory loss is about the same. The delusions, hallucinations, and anxiety have improved since she moved. Since last time I saw her, she was hospitalized for the psychiatric issues. When she was seen by Dr. Antonia Cheatham, the Zyprexa was discontinued. She was placed on Risperdal 0.5 mg twice daily. Following that hospitalization, she was referred to psychiatry at [address] [place] [address]. She does have an appointment with a gerontologist through whom she needs to get a referral to psychiatry at [address] [place] [address] at the end of this month. Ms. Valeria Muller indicates that she feels that she is doing well. Recap from LOV:  Memory loss with psychosis. Zyprexa has helped with the sleeping issues but has not helped with the delusions, hallucinations, or anxiety. Currently, she is being weaned off of her famotidine due to potential side effect and we will have to wait and see if there is any improvement in the hallucinations. In the meantime, her anxiety seems worse. Suggested we keep the Zyprexa in place but add Zoloft to help manage the generalized anxiety. Purpose and potential side effects were discussed. I will plan to see how she is doing on this in about four weeks. Hopefully by that time we will have been able to move her neurocognitive assessment up from November.      Current Outpatient Medications   Medication Sig    risperiDONE (RisperDAL) 0.5 mg tablet Take 0.5 mg by mouth two (2) times a day. lisinopriL (PRINIVIL, ZESTRIL) 10 mg tablet Take 1 Tablet by mouth daily. Indications: high blood pressure    sucralfate (CARAFATE) 1 gram tablet Take 1 Tablet by mouth three (3) times daily. Indications: gastroesophageal reflux disease    ammonium lactate (LAC-HYDRIN) 12 % lotion rub in to affected area well  Indications: dry skin    diclofenac (VOLTAREN) 1 % gel Apply 4 g to affected area every six (6) hours. Indications: joint damage causing pain and loss of function    traZODone (DESYREL) 50 mg tablet Take 0.5 Tablets by mouth nightly. Indications: insomnia associated with depression    furosemide (LASIX) 40 mg tablet Take 1 Tablet by mouth daily as needed for PRN Reason (Other) (edema). Indications: visible water retention    famotidine (PEPCID) 40 mg tablet Take 1 Tablet by mouth daily. Indications: gastroesophageal reflux disease    acetaminophen (TYLENOL) 500 mg tablet Take 500 mg by mouth every six (6) hours as needed for Pain. No current facility-administered medications for this visit. Allergies   Allergen Reactions    Aspirin Other (comments)     ulcers    Cephalexin Hives    Penicillins Anaphylaxis      Past Medical History:   Diagnosis Date    Chronic pain     GERD (gastroesophageal reflux disease)     Hypercholesteremia     Hypertension     Ill-defined condition     DJD    Ill-defined condition     Diverticulosis     Past Surgical History:   Procedure Laterality Date    HX DILATION AND CURETTAGE      HX HEENT  2001    Oral surgery - gum disease    HX ORTHOPAEDIC Right 2006    Trigger finger repair    UPPER GI ENDOSCOPY,BIOPSY  9/27/2021           reports that she has never smoked. She has never used smokeless tobacco. She reports that she does not drink alcohol and does not use drugs. family history includes Heart Attack in her father; Stroke in her mother. ROS    Objective:   No flowsheet data found.      General:  Well defined, nourished, and groomed individual in no acute distress. Psych:  Good mood though her affect is a little flat    NEUROLOGICAL EXAMINATION:     Mental Status:   Alert and oriented to person, place, and time with recent and remote memory intact. Attention span and concentration are normal. Speech is fluent with a full fund of knowledge. Cranial Nerves:  I: smell Not tested   II: visual fields Not assessed   II: pupils Equal, round, reactive to light   II: optic disc Not assessed   III,VII: ptosis none   III,IV,VI: extraocular muscles  Full ROM   V: mastication normal   V: facial light touch sensation  Not assessed   VII: facial muscle function   symmetric   VIII: hearing symmetric   IX: soft palate elevation  normal   XI: trapezius strength  Not assessed   XI: sternocleidomastoid strength Not assessed   XI: neck flexion strength  Not assessed   XII: tongue  midline     Motor Examination: Normal tone and bulk. Strength was not assessed      Sensory exam:  Not assessed     Coordination:  No resting or intention tremor    Gait and Station:   No muscle wasting or fasiculations noted. Reflexes:  Not assessed    Assessment & Plan:       ICD-10-CM ICD-9-CM    1. Unspecified mood (affective) disorder ([facility])  F39 296.90       2. Paranoid delusion ([facility] [address]  F22 297.9       3. Auditory hallucinations  R44.0 780.1       4. Hallucination, visual  R44.1 368.16           Ms. Doretha Charles was accompanied by her daughter for this visit. Per both their reports, the delusions, hallucinations, and anxiety are better since she has been on the Risperdal 0.5 mg twice daily. Her daughter did express some concern that perhaps the medication was making her a little too flat but Mrs. Doretha Charles would prefer not changing anything as she feels she is doing better. She did have neuropsychological evaluation though that testing was stopped early. Due to the length of the testing, Mrs. Carolina Stevens was having difficulty tolerating sitting for so long. Based on the testing that was done, it was not felt that she had a dementing process. It was recommended that she continue with psychiatric care. Apparently, she does have an appointment with a gerontologist at [facility] who apparently will need to provide the referral to psychiatry at [facility]. Just to ensure that she does not fall through the cracks with this, I will plan to see her back in about 6 months. If she is well established given that she does not have a diagnosed neurologic issue, I will plan to see her back as needed following that. She and her family were in agreement with that plan. I spent at least 32 minutes on this visit with this established patient. We discussed the expected course, resolution and complications of the diagnosis(es) in detail. Medication risks, benefits, costs, interactions, and alternatives were discussed as indicated. I advised her to contact the office if her condition worsens, changes or fails to improve as anticipated. She expressed understanding with the diagnosis(es) and plan. Anaisdenisse Georges, was evaluated through a synchronous (real-time) audio-video encounter. The patient (or guardian if applicable) is aware that this is a billable service, which includes applicable co-pays. This Virtual Visit was conducted with patient's (and/or legal guardian's) consent. The visit was conducted pursuant to the emergency declaration under the [facility], [address] [place] [address] authority and the [employer] Act. Patient identification was verified, and a caregiver was present when appropriate.   The patient was located at: Home: [facility] [address] [address]  The provider was located at: Home: [address] [place] [address]

## 2023-05-20 RX ORDER — RISPERIDONE 0.5 MG/1
0.5 TABLET ORAL 2 TIMES DAILY
COMMUNITY
Start: 2022-07-25

## 2023-05-20 RX ORDER — FUROSEMIDE 40 MG/1
40 TABLET ORAL DAILY PRN
COMMUNITY
Start: 2022-04-18

## 2023-05-20 RX ORDER — FAMOTIDINE 40 MG/1
40 TABLET, FILM COATED ORAL DAILY
COMMUNITY
Start: 2022-04-19

## 2023-05-20 RX ORDER — TRAZODONE HYDROCHLORIDE 50 MG/1
25 TABLET ORAL
COMMUNITY
Start: 2022-04-18

## 2023-05-20 RX ORDER — AMMONIUM LACTATE 12 G/100G
LOTION TOPICAL
COMMUNITY
Start: 2022-04-18

## 2023-05-20 RX ORDER — LISINOPRIL 10 MG/1
10 TABLET ORAL DAILY
COMMUNITY
Start: 2022-04-19

## 2023-05-20 RX ORDER — SUCRALFATE 1 G/1
1 TABLET ORAL 3 TIMES DAILY
COMMUNITY
Start: 2022-04-18

## 2023-05-20 RX ORDER — ACETAMINOPHEN 500 MG
500 TABLET ORAL EVERY 6 HOURS PRN
COMMUNITY

## 2023-08-14 ENCOUNTER — TRANSCRIBE ORDERS (OUTPATIENT)
Facility: HOSPITAL | Age: 83
End: 2023-08-14

## 2023-08-14 DIAGNOSIS — M51.36 DDD (DEGENERATIVE DISC DISEASE), LUMBAR: Primary | ICD-10-CM

## 2023-08-21 ENCOUNTER — HOSPITAL ENCOUNTER (OUTPATIENT)
Facility: HOSPITAL | Age: 83
Discharge: HOME | End: 2023-08-24
Payer: MEDICARE

## 2023-08-21 DIAGNOSIS — M51.36 DDD (DEGENERATIVE DISC DISEASE), LUMBAR: ICD-10-CM

## 2023-08-21 PROCEDURE — 72148 MRI LUMBAR SPINE W/O DYE: CPT

## 2024-06-13 ENCOUNTER — HOSPITAL ENCOUNTER (OUTPATIENT)
Facility: HOSPITAL | Age: 84
Discharge: HOME | End: 2024-06-13
Attending: INTERNAL MEDICINE
Payer: MEDICARE

## 2024-06-13 VITALS
TEMPERATURE: 97.9 F | DIASTOLIC BLOOD PRESSURE: 62 MMHG | RESPIRATION RATE: 18 BRPM | SYSTOLIC BLOOD PRESSURE: 131 MMHG | HEART RATE: 78 BPM

## 2024-06-13 DIAGNOSIS — L97.911 NON-PRESSURE CHRONIC ULCER OF RIGHT LOWER LEG, LIMITED TO BREAKDOWN OF SKIN (HCC): Primary | ICD-10-CM

## 2024-06-13 DIAGNOSIS — L97.921 NON-PRESSURE CHRONIC ULCER OF LEFT LOWER LEG, LIMITED TO BREAKDOWN OF SKIN (HCC): ICD-10-CM

## 2024-06-13 PROCEDURE — 97597 DBRDMT OPN WND 1ST 20 CM/<: CPT

## 2024-06-13 PROCEDURE — 99213 OFFICE O/P EST LOW 20 MIN: CPT

## 2024-06-13 PROCEDURE — 97598 DBRDMT OPN WND ADDL 20CM/<: CPT

## 2024-06-13 RX ORDER — GENTAMICIN SULFATE 1 MG/G
OINTMENT TOPICAL ONCE
Status: CANCELLED | OUTPATIENT
Start: 2024-06-13 | End: 2024-06-13

## 2024-06-13 RX ORDER — IBUPROFEN 200 MG
TABLET ORAL ONCE
OUTPATIENT
Start: 2024-06-13 | End: 2024-06-13

## 2024-06-13 RX ORDER — TRIAMCINOLONE ACETONIDE 1 MG/G
OINTMENT TOPICAL ONCE
Status: CANCELLED | OUTPATIENT
Start: 2024-06-13 | End: 2024-06-13

## 2024-06-13 RX ORDER — LIDOCAINE HYDROCHLORIDE 40 MG/ML
SOLUTION TOPICAL ONCE
OUTPATIENT
Start: 2024-06-13 | End: 2024-06-13

## 2024-06-13 RX ORDER — LIDOCAINE 50 MG/G
OINTMENT TOPICAL ONCE
Status: CANCELLED | OUTPATIENT
Start: 2024-06-13 | End: 2024-06-13

## 2024-06-13 RX ORDER — LIDOCAINE HYDROCHLORIDE 20 MG/ML
JELLY TOPICAL ONCE
Status: CANCELLED | OUTPATIENT
Start: 2024-06-13 | End: 2024-06-13

## 2024-06-13 RX ORDER — BETAMETHASONE DIPROPIONATE 0.5 MG/G
CREAM TOPICAL ONCE
Status: CANCELLED | OUTPATIENT
Start: 2024-06-13 | End: 2024-06-13

## 2024-06-13 RX ORDER — GENTAMICIN SULFATE 1 MG/G
OINTMENT TOPICAL ONCE
OUTPATIENT
Start: 2024-06-13 | End: 2024-06-13

## 2024-06-13 RX ORDER — LIDOCAINE HYDROCHLORIDE 20 MG/ML
JELLY TOPICAL ONCE
OUTPATIENT
Start: 2024-06-13 | End: 2024-06-13

## 2024-06-13 RX ORDER — BETAMETHASONE DIPROPIONATE 0.5 MG/G
CREAM TOPICAL ONCE
OUTPATIENT
Start: 2024-06-13 | End: 2024-06-13

## 2024-06-13 RX ORDER — LIDOCAINE 40 MG/G
CREAM TOPICAL ONCE
Status: CANCELLED | OUTPATIENT
Start: 2024-06-13 | End: 2024-06-13

## 2024-06-13 RX ORDER — LIDOCAINE HYDROCHLORIDE 40 MG/ML
SOLUTION TOPICAL ONCE
Status: CANCELLED | OUTPATIENT
Start: 2024-06-13 | End: 2024-06-13

## 2024-06-13 RX ORDER — BACITRACIN ZINC AND POLYMYXIN B SULFATE 500; 1000 [USP'U]/G; [USP'U]/G
OINTMENT TOPICAL ONCE
OUTPATIENT
Start: 2024-06-13 | End: 2024-06-13

## 2024-06-13 RX ORDER — TRIAMCINOLONE ACETONIDE 1 MG/G
OINTMENT TOPICAL ONCE
OUTPATIENT
Start: 2024-06-13 | End: 2024-06-13

## 2024-06-13 RX ORDER — GINSENG 100 MG
CAPSULE ORAL ONCE
Status: CANCELLED | OUTPATIENT
Start: 2024-06-13 | End: 2024-06-13

## 2024-06-13 RX ORDER — SODIUM CHLOR/HYPOCHLOROUS ACID 0.033 %
SOLUTION, IRRIGATION IRRIGATION ONCE
OUTPATIENT
Start: 2024-06-13 | End: 2024-06-13

## 2024-06-13 RX ORDER — LIDOCAINE 40 MG/G
CREAM TOPICAL ONCE
OUTPATIENT
Start: 2024-06-13 | End: 2024-06-13

## 2024-06-13 RX ORDER — LIDOCAINE 50 MG/G
OINTMENT TOPICAL ONCE
OUTPATIENT
Start: 2024-06-13 | End: 2024-06-13

## 2024-06-13 RX ORDER — SODIUM CHLOR/HYPOCHLOROUS ACID 0.033 %
SOLUTION, IRRIGATION IRRIGATION ONCE
Status: CANCELLED | OUTPATIENT
Start: 2024-06-13 | End: 2024-06-13

## 2024-06-13 RX ORDER — CLOBETASOL PROPIONATE 0.5 MG/G
OINTMENT TOPICAL ONCE
OUTPATIENT
Start: 2024-06-13 | End: 2024-06-13

## 2024-06-13 RX ORDER — IBUPROFEN 200 MG
TABLET ORAL ONCE
Status: CANCELLED | OUTPATIENT
Start: 2024-06-13 | End: 2024-06-13

## 2024-06-13 RX ORDER — CLOBETASOL PROPIONATE 0.5 MG/G
OINTMENT TOPICAL ONCE
Status: CANCELLED | OUTPATIENT
Start: 2024-06-13 | End: 2024-06-13

## 2024-06-13 RX ORDER — GINSENG 100 MG
CAPSULE ORAL ONCE
OUTPATIENT
Start: 2024-06-13 | End: 2024-06-13

## 2024-06-13 RX ORDER — BACITRACIN ZINC AND POLYMYXIN B SULFATE 500; 1000 [USP'U]/G; [USP'U]/G
OINTMENT TOPICAL ONCE
Status: CANCELLED | OUTPATIENT
Start: 2024-06-13 | End: 2024-06-13

## 2024-06-13 NOTE — DISCHARGE INSTRUCTIONS
Discharge Instructions/Wound Care Orders  [facility]   [address] [address], [address]  [place] [id number]  Telephone: [phone]     FAX [phone]     NAME:  Cristal Hensley  YOB: 1940  MEDICAL RECORD NUMBER:  [id number]  DATE:  6/13/2024    CPT Codes: Debridement selective (35139)    Wound Care Orders:  Right and left lower legs :Cleanse with normal saline, apply vaseline petroleum jelly liberally, cover any open areas with xeroform, cover with secondary dressing Gauze.  Apply 2 layer compression wrap with calamine (50% compression please).  Pt./pcg/HH nurse to change (freq) Once a week in clinic  and as needed for compromise.F/U in 1 week.     Treatment Orders:   Elevate leg(s) above the level of the heart when sitting, if swelling present.  Avoid prolonged standing in one place.  Wear off-loading shoe/boot on the affected foot when walking.  Do not get dressing/cast wet.  Do not use objects to scratch inside cast/wrap.   Follow diet as prescribed:  [x] Diet as tolerated: [] Diabetic Diet: Low carb no sugar [x] No Added Salt:  [x] Increase Protein: [] Other:Limit the amount of liquid you are drinking and avoid drinking in between meals             Follow-up:  [x] Return Appointment: With Dr. Orosco in  1 Week(s)  [] Ordered tests:    Electronically signed DANN MYERS RN on 6/13/2024 at 1:53 PM     Wound Care Center Information: Should you experience any significant changes in your wound(s) or have questions about your wound care, please contact the [facility] at MONDAY - FRIDAY 8:00 am - 4:30.  If you need help with your wound outside these hours and cannot wait until we are again available, contact your PCP or go to the hospital emergency room.     PLEASE NOTE: IF YOU ARE UNABLE TO OBTAIN WOUND SUPPLIES, CONTINUE TO USE THE SUPPLIES YOU HAVE AVAILABLE UNTIL YOU ARE ABLE TO REACH US. IT IS MOST IMPORTANT TO KEEP THE WOUND COVERED AT

## 2024-06-13 NOTE — FLOWSHEET NOTE
06/13/24 1311   Right Leg Edema Point of Measurement   Leg circumference 33.9 cm   Ankle circumference 21 cm   Compression Therapy Compression not ordered   Left Leg Edema Point of Measurement   Leg circumference 33.7 cm   Ankle circumference 22.1 cm   Compression Therapy Compression not ordered   RLE Neurovascular Assessment   Capillary Refill Less than/Equal to 3 seconds   Color Appropriate for Ethnicity   Temperature Warm   R Pedal Pulse +2   LLE Neurovascular Assessment   Capillary Refill Less than/Equal to 3 seconds   Color Appropriate for Ethnicity   Temperature Warm   L Pedal Pulse +2   Wound 06/13/24 Leg Left;Lower   Date First Assessed/Time First Assessed: 06/13/24 1322   Primary Wound Type: Other (comment)  Location: Leg  Wound Location Orientation: Left;Lower   Wound Image    Wound Etiology Other   Dressing Status Old drainage noted   Wound Cleansed Cleansed with saline   Wound Length (cm) 17.9 cm   Wound Width (cm) 6 cm   Wound Depth (cm) 0.1 cm   Wound Surface Area (cm^2) 107.4 cm^2   Wound Volume (cm^3) 10.74 cm^3   Wound Assessment   (scabbed over areas)   Drainage Amount None (dry)   Odor None   Rosita-wound Assessment Dry/flaky   Margins Attached edges   Wound Thickness Description not for Pressure Injury Partial thickness   Wound 06/13/24 Leg Right;Lower   Date First Assessed/Time First Assessed: 06/13/24 1322   Primary Wound Type: Other (comment)  Location: Leg  Wound Location Orientation: Right;Lower   Wound Image    Wound Etiology Other   Dressing Status Old drainage noted   Wound Cleansed Cleansed with saline   Wound Length (cm) 22 cm   Wound Width (cm) 17 cm   Wound Depth (cm) 0.1 cm   Wound Surface Area (cm^2) 374 cm^2   Wound Volume (cm^3) 37.4 cm^3   Wound Assessment   (scabbed over areas)   Drainage Amount None (dry)   Odor None   Rosita-wound Assessment Dry/flaky   Margins Attached edges   Wound Thickness Description not for Pressure Injury Partial thickness   Pain Assessment   Pain

## 2024-06-13 NOTE — FLOWSHEET NOTE
06/13/24 1352   Wound 06/13/24 Leg Left;Lower   Date First Assessed/Time First Assessed: 06/13/24 1322   Primary Wound Type: Other (comment)  Location: Leg  Wound Location Orientation: Left;Lower   Dressing/Treatment   (Xeroform, gauze, 2 layer calamine wrap at 50% stretch)   Post-Procedure Length (cm) 17.9 cm   Post-Procedure Width (cm) 0.6 cm   Post-Procedure Depth (cm) 0.2 cm   Post-Procedure Surface Area (cm^2) 10.74 cm^2   Post-Procedure Volume (cm^3) 2.148 cm^3   Wound 06/13/24 Leg Right;Lower   Date First Assessed/Time First Assessed: 06/13/24 1322   Primary Wound Type: Other (comment)  Location: Leg  Wound Location Orientation: Right;Lower   Post-Procedure Length (cm) 22 cm   Post-Procedure Width (cm) 17 cm   Post-Procedure Depth (cm) 0.2 cm   Post-Procedure Surface Area (cm^2) 374 cm^2   Post-Procedure Volume (cm^3) 74.8 cm^3     Multilayer Compression Wrap   (Not Unna) Below the Knee    NAME:  Cristal Hensley  YOB: 1940  MEDICAL RECORD NUMBER:  [id number]  DATE:  6/13/2024    Multilayer compression wrap: Removed old Multilayer wrap if indicated and wash leg with mild soap/water.  Applied moisturizing agent to dry skin as needed.   Applied primary and secondary dressing as ordered.  Applied multilayered dressing below the knee to right lower leg.  Applied multilayered dressing below the knee to left lower leg.  Instructed patient/caregiver not to remove dressing and to keep it clean and dry.   Instructed patient/caregiver on complications to report to provider, such as pain, numbness in toes, heavy drainage, and slippage of dressing.  Instructed patient on purpose of compression dressing and on activity and exercise recommendations.    Patient tolerated procedure well with no impairment to circulation noted.      Electronically signed by DANN MYERS RN on 6/13/2024 at 1:57 PM

## 2024-06-13 NOTE — PROGRESS NOTES
Reprise of venous stasis disease in a patient with hypertension, hyperlipidemia, dementia/depression and venous stasis.   Her history is not very valuable, supported by family.    Assisted living, recent (relatively) compression fracture. Poor attendance to support/compression garments.    No complaint referable to the legs, brookso' family notes some swellling (\"not so bad if she takes the lasix\").    PMH as above    Current Outpatient Medications:     acetaminophen (TYLENOL) 500 MG tablet, Take 1 tablet by mouth every 6 hours as needed, Disp: , Rfl:     ammonium lactate (LAC-HYDRIN) 12 % lotion, rub in to affected area well  Indications: dry skin, Disp: , Rfl:     diclofenac sodium (VOLTAREN) 1 % GEL, Apply 4 g topically every 6 hours, Disp: , Rfl:     famotidine (PEPCID) 40 MG tablet, Take 1 tablet by mouth daily, Disp: , Rfl:     furosemide (LASIX) 40 MG tablet, Take 1 tablet by mouth daily as needed, Disp: , Rfl:     lisinopril (PRINIVIL;ZESTRIL) 10 MG tablet, Take 1 tablet by mouth daily, Disp: , Rfl:     risperiDONE (RISPERDAL) 0.5 MG tablet, Take 1 tablet by mouth 2 times daily, Disp: , Rfl:     sucralfate (CARAFATE) 1 GM tablet, Take 1 tablet by mouth 3 times daily, Disp: , Rfl:     traZODone (DESYREL) 50 MG tablet, Take 0.5 tablets by mouth, Disp: , Rfl:     FH is of no help  SH: assisted living; no smokes or etoh.  ROS entirely, in entirely unreliably, negative.    Alert,cheerful and conversant. Poor memory.  Anicteric.  No jvd  /62   Pulse 78   Temp 97.9 °F (36.6 °C) (Temporal)   Resp 18   Regular heart with =out extra sounds.  Clear lungs and soft abdomen.  1+ edema.  Pretibial hyperpigmentation (hemosiderin staining) and prominent xerosis.    After permission and lidocaine, gauze and a curette were used  to remove considerable xerotic plaquing, with discovery of a couple of small (<1cm2) lesions with clean, pink, granulomatous bases. No significant bleeding. A little discomfort.    Plan for

## 2024-06-20 ENCOUNTER — HOSPITAL ENCOUNTER (OUTPATIENT)
Facility: HOSPITAL | Age: 84
Discharge: HOME | End: 2024-06-20
Attending: INTERNAL MEDICINE
Payer: MEDICARE

## 2024-06-20 VITALS
DIASTOLIC BLOOD PRESSURE: 61 MMHG | RESPIRATION RATE: 18 BRPM | HEART RATE: 74 BPM | SYSTOLIC BLOOD PRESSURE: 139 MMHG | TEMPERATURE: 97.6 F

## 2024-06-20 DIAGNOSIS — L97.911 NON-PRESSURE CHRONIC ULCER OF RIGHT LOWER LEG, LIMITED TO BREAKDOWN OF SKIN (HCC): ICD-10-CM

## 2024-06-20 DIAGNOSIS — L97.921 NON-PRESSURE CHRONIC ULCER OF LEFT LOWER LEG, LIMITED TO BREAKDOWN OF SKIN (HCC): Primary | ICD-10-CM

## 2024-06-20 PROCEDURE — 29581 APPL MULTLAYER CMPRN SYS LEG: CPT

## 2024-06-20 PROCEDURE — 97597 DBRDMT OPN WND 1ST 20 CM/<: CPT

## 2024-06-20 RX ORDER — SODIUM CHLOR/HYPOCHLOROUS ACID 0.033 %
SOLUTION, IRRIGATION IRRIGATION ONCE
OUTPATIENT
Start: 2024-06-20 | End: 2024-06-20

## 2024-06-20 RX ORDER — TRIAMCINOLONE ACETONIDE 1 MG/G
OINTMENT TOPICAL ONCE
OUTPATIENT
Start: 2024-06-20 | End: 2024-06-20

## 2024-06-20 RX ORDER — GENTAMICIN SULFATE 1 MG/G
OINTMENT TOPICAL ONCE
OUTPATIENT
Start: 2024-06-20 | End: 2024-06-20

## 2024-06-20 RX ORDER — LIDOCAINE 50 MG/G
OINTMENT TOPICAL ONCE
OUTPATIENT
Start: 2024-06-20 | End: 2024-06-20

## 2024-06-20 RX ORDER — BETAMETHASONE DIPROPIONATE 0.5 MG/G
CREAM TOPICAL ONCE
OUTPATIENT
Start: 2024-06-20 | End: 2024-06-20

## 2024-06-20 RX ORDER — LIDOCAINE HYDROCHLORIDE 20 MG/ML
JELLY TOPICAL ONCE
OUTPATIENT
Start: 2024-06-20 | End: 2024-06-20

## 2024-06-20 RX ORDER — GINSENG 100 MG
CAPSULE ORAL ONCE
OUTPATIENT
Start: 2024-06-20 | End: 2024-06-20

## 2024-06-20 RX ORDER — LIDOCAINE 40 MG/G
CREAM TOPICAL ONCE
OUTPATIENT
Start: 2024-06-20 | End: 2024-06-20

## 2024-06-20 RX ORDER — IBUPROFEN 200 MG
TABLET ORAL ONCE
OUTPATIENT
Start: 2024-06-20 | End: 2024-06-20

## 2024-06-20 RX ORDER — BACITRACIN ZINC AND POLYMYXIN B SULFATE 500; 1000 [USP'U]/G; [USP'U]/G
OINTMENT TOPICAL ONCE
OUTPATIENT
Start: 2024-06-20 | End: 2024-06-20

## 2024-06-20 RX ORDER — CLOBETASOL PROPIONATE 0.5 MG/G
OINTMENT TOPICAL ONCE
OUTPATIENT
Start: 2024-06-20 | End: 2024-06-20

## 2024-06-20 RX ORDER — LIDOCAINE HYDROCHLORIDE 40 MG/ML
SOLUTION TOPICAL ONCE
OUTPATIENT
Start: 2024-06-20 | End: 2024-06-20

## 2024-06-20 NOTE — FLOWSHEET NOTE
06/20/24 1400   Right Leg Edema Point of Measurement   Leg circumference 32 cm   Ankle circumference 20 cm   Compression Therapy 2 layer compression wrap   Left Leg Edema Point of Measurement   Leg circumference 32 cm   Ankle circumference 20 cm   Compression Therapy 2 layer compression wrap   RLE Neurovascular Assessment   Capillary Refill Less than/Equal to 3 seconds   Color Appropriate for Ethnicity   Temperature Warm   R Pedal Pulse +2   LLE Neurovascular Assessment   Capillary Refill Less than/Equal to 3 seconds   Color Appropriate for Ethnicity   Temperature Warm   L Pedal Pulse +2   Wound 06/13/24 Leg Left;Lower   Date First Assessed/Time First Assessed: 06/13/24 1322   Primary Wound Type: Other (comment)  Location: Leg  Wound Location Orientation: Left;Lower   Wound Image    Wound Etiology Other   Dressing Status Old drainage noted   Wound Cleansed Soap and water   Wound Length (cm) 0.1 cm   Wound Width (cm) 0.1 cm   Wound Depth (cm) 0.1 cm   Wound Surface Area (cm^2) 0.01 cm^2   Change in Wound Size % (l*w) 99.99   Wound Volume (cm^3) 0.001 cm^3   Wound Healing % 100   Wound Assessment Epithelialization   Drainage Amount Scant (moist but unmeasurable)   Drainage Description Serous   Odor None   Rosita-wound Assessment Fragile   Margins Attached edges   Wound Thickness Description not for Pressure Injury Partial thickness   Wound 06/13/24 Leg Right;Lower   Date First Assessed/Time First Assessed: 06/13/24 1322   Primary Wound Type: Other (comment)  Location: Leg  Wound Location Orientation: Right;Lower   Wound Image     Wound Etiology Other   Dressing Status Old drainage noted   Wound Cleansed Soap and water   Wound Length (cm) 3.2 cm   Wound Width (cm) 14 cm   Wound Depth (cm) 0.1 cm   Wound Surface Area (cm^2) 44.8 cm^2   Change in Wound Size % (l*w) 88.02   Wound Volume (cm^3) 4.48 cm^3   Wound Healing % 88   Wound Assessment Pink/red   Drainage Amount Small (< 25%)   Drainage Description Serous   Odor

## 2024-06-20 NOTE — DISCHARGE INSTRUCTIONS
Discharge Instructions [facility]  [address]   [address], [address]  [place] [address]   Telephone: [phone]     FAX [phone]    NAME:  Cristal Hensley  YOB: 1940  MEDICAL RECORD NUMBER:  [id number]  DATE:  6/20/2024    CPT code:Debridement selective (45536) and Multilayer compression wrap (85883)    WOUND CARE ORDERS:  Bilateral lower leg wounds :Cleanse with soap and water , apply primary dressing Vaseline and  Xeroform  only to Right lower leg wounds covered with secondary dressing Gauze.  Apply 2 layer compression wrap with calamine Pt./pcg/HH nurse to change (freq) Once a week in clinic  and as needed for compromise.Follow up with provider in 1 Week(s).   Compression stocking 10-15 mmHg  Home Health Agency: none  TREATMENT ORDERS:    Elevate leg(s) above the level of the heart when sitting.   Avoid prolonged standing in one place.  Do no get dressing/wrap wet.  Follow Diet as prescribed:   [] Diet as tolerated: [] Calorie Diabetic Diet: Low carb and no Sugar [] No Added Salt:  [] Increase Protein: [] Limit the amount of liquid you are drinking and avoid drinking in between meals     Return Appointment:  [x] Return Appointment: With Dr. Orosco in  1 Week(s)  [] Nurse Visit : *** days  [] Ordered tests:    Electronically signed Britt Taylor RN on 6/20/2024 at 2:57 PM     Wound Care Center Information: Should you experience any significant changes in your wound(s) or have questions about your wound care, please contact the [facility] at MONDAY - FRIDAY 8:00 am - 4:30.  If you need help with your wound outside these hours and cannot wait until we are again available, contact your PCP or go to the hospital emergency room.   PLEASE NOTE: IF YOU ARE UNABLE TO OBTAIN WOUND SUPPLIES, CONTINUE TO USE THE SUPPLIES YOU HAVE AVAILABLE UNTIL YOU ARE ABLE TO REACH US. IT IS MOST IMPORTANT TO KEEP THE WOUND COVERED AT ALL TIMES.     Physician

## 2024-06-20 NOTE — FLOWSHEET NOTE
06/20/24 1514   Wound 06/13/24 Leg Left;Lower   Date First Assessed/Time First Assessed: 06/13/24 1322   Primary Wound Type: Other (comment)  Location: Leg  Wound Location Orientation: Left;Lower   Dressing Status New dressing applied   Dressing/Treatment   (Vaseline and  Xeroform  only to Right lower leg wounds covered with secondary dressing Gauze.  Apply 2 layer compression wrap with calamine)   Wound 06/13/24 Leg Right;Lower   Date First Assessed/Time First Assessed: 06/13/24 1322   Primary Wound Type: Other (comment)  Location: Leg  Wound Location Orientation: Right;Lower   Dressing Status New dressing applied   Dressing/Treatment   (Vaseline and  Xeroform  only to Right lower leg wounds covered with secondary dressing Gauze.  Apply 2 layer compression wrap with calamine)   Post-Procedure Length (cm) 3.2 cm   Post-Procedure Width (cm) 14 cm   Post-Procedure Depth (cm) 0.2 cm   Post-Procedure Surface Area (cm^2) 44.8 cm^2   Post-Procedure Volume (cm^3) 8.96 cm^3     Multilayer Compression Wrap  (Not Unna) Below the Knee    NAME:  Cristal Hensley  YOB: 1940  MEDICAL RECORD NUMBER:  [id number]  DATE:  6/20/2024    Removed old Multilayer wrap if indicated and wash leg with mild soap/water.  Applied moisturizing agent to dry skin as needed.   Applied primary and secondary dressing as ordered.  Applied multilayered dressing below the knee to Left and Right lower leg.  Instructed patient/caregiver not to remove dressing and to keep it clean and dry.   Instructed patient/caregiver on complications to report to provider, such as pain, numbness in toes, heavy drainage, and slippage of dressing.  Instructed patient on purpose of compression dressing and on activity and exercise recommendations.    Patient tolerated procedure well with no impairment to circulation noted.    Electronically signed by Britt Taylor RN on 6/20/2024 at 3:18 PM

## 2024-06-20 NOTE — PROGRESS NOTES
Followup for venous stasis disease.  Has been doing pretty well. Son provides help with history.  No intercurrent illnesses, systemic symptoms, new complaints or changes in meds.  Tolerated dressings.    Alert, and conversant. A bit slow in rejoinder.  /61   Pulse 74   Temp 97.6 °F (36.4 °C) (Temporal)   Resp 18   The legs look very good compared to last time. Less edema, fewer open areas and less xerosis.  Pulses are fair.  After permission, and absent need for topical anesthesia, a  small area of waxy xerosis on the R leg was removed from a superficial wound with a curette without blood or discomfort.  A few other \"heaped up\"  areas were abraded without finding additional lesions, even partial thickness ones.  Overall the legs are still quite dry with a tendency to crusting.    Plan vaseline overall, xeroform to the few (half dozen or so, less than square centimeter) ,lesions on the R leg (the L has none open0.    2 layer wraps. Plans to obtain low pressure (15mmHg) pressure stocking for use when all are healed. RTC 1 week.  Patient and caregiver(s) know we are available in the interim  for questions or developing erythema, edema, warmth or pain.    I87.3  L9.407

## 2024-06-27 ENCOUNTER — HOSPITAL ENCOUNTER (OUTPATIENT)
Facility: HOSPITAL | Age: 84
Discharge: HOME | End: 2024-06-27
Attending: INTERNAL MEDICINE
Payer: MEDICARE

## 2024-06-27 VITALS
DIASTOLIC BLOOD PRESSURE: 63 MMHG | SYSTOLIC BLOOD PRESSURE: 109 MMHG | TEMPERATURE: 98.1 F | HEART RATE: 92 BPM | RESPIRATION RATE: 16 BRPM

## 2024-06-27 DIAGNOSIS — L97.911 NON-PRESSURE CHRONIC ULCER OF RIGHT LOWER LEG, LIMITED TO BREAKDOWN OF SKIN (HCC): ICD-10-CM

## 2024-06-27 DIAGNOSIS — L97.921 NON-PRESSURE CHRONIC ULCER OF LEFT LOWER LEG, LIMITED TO BREAKDOWN OF SKIN (HCC): Primary | ICD-10-CM

## 2024-06-27 PROCEDURE — 29581 APPL MULTLAYER CMPRN SYS LEG: CPT

## 2024-06-27 RX ORDER — LIDOCAINE HYDROCHLORIDE 40 MG/ML
SOLUTION TOPICAL ONCE
OUTPATIENT
Start: 2024-06-27 | End: 2024-06-27

## 2024-06-27 RX ORDER — LIDOCAINE HYDROCHLORIDE 20 MG/ML
JELLY TOPICAL ONCE
OUTPATIENT
Start: 2024-06-27 | End: 2024-06-27

## 2024-06-27 RX ORDER — SODIUM CHLOR/HYPOCHLOROUS ACID 0.033 %
SOLUTION, IRRIGATION IRRIGATION ONCE
OUTPATIENT
Start: 2024-06-27 | End: 2024-06-27

## 2024-06-27 RX ORDER — GINSENG 100 MG
CAPSULE ORAL ONCE
OUTPATIENT
Start: 2024-06-27 | End: 2024-06-27

## 2024-06-27 RX ORDER — TRIAMCINOLONE ACETONIDE 1 MG/G
OINTMENT TOPICAL ONCE
OUTPATIENT
Start: 2024-06-27 | End: 2024-06-27

## 2024-06-27 RX ORDER — CLOBETASOL PROPIONATE 0.5 MG/G
OINTMENT TOPICAL ONCE
OUTPATIENT
Start: 2024-06-27 | End: 2024-06-27

## 2024-06-27 RX ORDER — BACITRACIN ZINC AND POLYMYXIN B SULFATE 500; 1000 [USP'U]/G; [USP'U]/G
OINTMENT TOPICAL ONCE
OUTPATIENT
Start: 2024-06-27 | End: 2024-06-27

## 2024-06-27 RX ORDER — IBUPROFEN 200 MG
TABLET ORAL ONCE
OUTPATIENT
Start: 2024-06-27 | End: 2024-06-27

## 2024-06-27 RX ORDER — LIDOCAINE 50 MG/G
OINTMENT TOPICAL ONCE
OUTPATIENT
Start: 2024-06-27 | End: 2024-06-27

## 2024-06-27 RX ORDER — BETAMETHASONE DIPROPIONATE 0.5 MG/G
CREAM TOPICAL ONCE
OUTPATIENT
Start: 2024-06-27 | End: 2024-06-27

## 2024-06-27 RX ORDER — GENTAMICIN SULFATE 1 MG/G
OINTMENT TOPICAL ONCE
OUTPATIENT
Start: 2024-06-27 | End: 2024-06-27

## 2024-06-27 RX ORDER — LIDOCAINE 40 MG/G
CREAM TOPICAL ONCE
OUTPATIENT
Start: 2024-06-27 | End: 2024-06-27

## 2024-06-27 NOTE — FLOWSHEET NOTE
06/27/24 1541   Right Leg Edema Point of Measurement   Leg circumference 31 cm   Ankle circumference 19.3 cm   Compression Therapy 2 layer compression wrap   Left Leg Edema Point of Measurement   Leg circumference 31 cm   Ankle circumference 19 cm   Compression Therapy 2 layer compression wrap   RLE Neurovascular Assessment   Capillary Refill Less than/Equal to 3 seconds   Color Appropriate for Ethnicity   Temperature Cool   R Pedal Pulse +1   LLE Neurovascular Assessment   Capillary Refill Less than/Equal to 3 seconds   Color Appropriate for Ethnicity   Temperature Warm   L Pedal Pulse +2   Wound 06/13/24 Leg Left;Lower   Date First Assessed/Time First Assessed: 06/13/24 1322   Primary Wound Type: Other (comment)  Location: Leg  Wound Location Orientation: Left;Lower   Wound Image    Wound Etiology Other   Dressing Status Old drainage noted   Wound Cleansed Soap and water   Wound Length (cm) 0.1 cm   Wound Width (cm) 0.1 cm   Wound Depth (cm) 0.1 cm   Wound Surface Area (cm^2) 0.01 cm^2   Change in Wound Size % (l*w) 99.99   Wound Volume (cm^3) 0.001 cm^3   Wound Healing % 100   Wound Assessment Epithelialization   Drainage Amount None (dry)   Odor None   Rosita-wound Assessment Fragile;Hemosiderin staining (brown yellow)   Margins Undefined edges   Wound 06/13/24 Leg Right;Lower   Date First Assessed/Time First Assessed: 06/13/24 1322   Primary Wound Type: Other (comment)  Location: Leg  Wound Location Orientation: Right;Lower   Wound Image    Wound Etiology Other   Dressing Status Old drainage noted   Wound Cleansed Soap and water   Wound Length (cm) 2 cm   Wound Width (cm) 1.5 cm   Wound Depth (cm) 0.1 cm   Wound Surface Area (cm^2) 3 cm^2   Change in Wound Size % (l*w) 99.2   Wound Volume (cm^3) 0.3 cm^3   Wound Healing % 99   Wound Assessment Pink/red   Drainage Amount Small (< 25%)   Drainage Description Serous   Odor None   Rosita-wound Assessment Fragile;Hemosiderin staining (brown yellow)   Margins

## 2024-06-27 NOTE — FLOWSHEET NOTE
Multilayer Compression Wrap  (Not Unna) Below the Knee    NAME:  Cristal Hensley  YOB: 1940  MEDICAL RECORD NUMBER:  [id number]  DATE:  6/27/2024    Removed old Multilayer wrap if indicated and wash leg with mild soap/water.  Applied moisturizing agent to dry skin as needed.   Applied primary and secondary dressing as ordered.  Applied multilayered dressing below the knee to right lower leg.  Instructed patient/caregiver not to remove dressing and to keep it clean and dry.   Instructed patient/caregiver on complications to report to provider, such as pain, numbness in toes, heavy drainage, and slippage of dressing.  Instructed patient on purpose of compression dressing and on activity and exercise recommendations.    Patient tolerated procedure well with no impairment to circulation noted.    Electronically signed by Maureen Herrera LPN on 6/27/2024 at 4:29 PM

## 2024-06-27 NOTE — DISCHARGE INSTRUCTIONS
Discharge Instructions/Wound Care Orders  [facility]   [address] [address], [address]  [address] [facility]  Telephone: [phone]     FAX [phone]     NAME:  Cristal Hensley  YOB: 1940  MEDICAL RECORD NUMBER:  [id number]  DATE:  6/27/2024    CPT Codes: Multilayer compression wrap (56277)    HOME HEALTH:     Wound Care Orders:  Right lower leg wound :Cleanse with normal saline, apply primary dressing Xeroform  cover with secondary dressing Gauze.  Apply 2 layer compression wrap with calamine  Pt./pcg/HH nurse to change (freq) Once a week in clinic  and as needed for compromise.F/U nurse visit 07/03/24 3:15PM. If wound healed at nurse visit patient may be discharged to wear compression stocking on the right leg as well. If not healed she will follow-up with Dr. Dean andrade of 07/08/24.     Treatment Orders:   Elevate leg(s) above the level of the heart when sitting, if swelling present.  Avoid prolonged standing in one place.  Wear off-loading shoe/boot on the affected foot when walking.  Do not get dressing/cast wet.  Do not use objects to scratch inside cast/wrap.   Follow diet as prescribed:  [x] Diet as tolerated: [] Diabetic Diet: Low carb no sugar [x] No Added Salt:  [] Increase Protein: [] Other:Limit the amount of liquid you are drinking and avoid drinking in between meals             Follow-up:  [x] Return Appointment: Nurse visit 07/03/24 3:15PM, if needed Dr. Dean andrade of 07/08/24.   [] Ordered tests:    Electronically signed DANN MYERS RN on 6/27/2024 at 4:15 PM     Wound Care Center Information: Should you experience any significant changes in your wound(s) or have questions about your wound care, please contact the [facility] at MONDAY - FRIDAY 8:00 am - 4:30.  If you need help with your wound outside these hours and cannot wait until we are again available, contact your PCP or go to the hospital emergency room.

## 2024-06-28 NOTE — PROGRESS NOTES
Followup for venous stasis disease.  She - and daughter - agree that she has been doing well since last seen. They bring some compression socks today (estimate about 10-12 mmHg0.  No intercurrent illnesses, systemic symptoms, new complaints or changes in meds.      Alertand conversant. If a bit slow in rejoinder.  /63   Pulse 92   Temp 98.1 °F (36.7 °C) (Temporal)   Resp 16     The L leg is healed with only residual xerosis and hyperpigmentation.    The R leg still has one partial thickness wound on the medial aspect, also with xerosis and hyperpigmentation..  Xeroform and compression wrap on the R. Vaseline and compression sock on the L when out of bed.  Elevation as possible.    RTC 2 weeks to see if she's healed and can revert to \"just\" moisturization and compression stockings - we discussed the \"happily ever after\"nature of the tyherapy for venous stasis disease.    L97.4934  L97.401  I87.3

## 2024-07-03 ENCOUNTER — HOSPITAL ENCOUNTER (OUTPATIENT)
Facility: HOSPITAL | Age: 84
Discharge: HOME | End: 2024-07-03
Attending: INTERNAL MEDICINE
Payer: MEDICARE

## 2024-07-03 VITALS
HEART RATE: 93 BPM | TEMPERATURE: 97.8 F | SYSTOLIC BLOOD PRESSURE: 129 MMHG | DIASTOLIC BLOOD PRESSURE: 66 MMHG | RESPIRATION RATE: 16 BRPM

## 2024-07-03 DIAGNOSIS — L97.911 NON-PRESSURE CHRONIC ULCER OF RIGHT LOWER LEG, LIMITED TO BREAKDOWN OF SKIN (HCC): Primary | ICD-10-CM

## 2024-07-03 DIAGNOSIS — L97.921 NON-PRESSURE CHRONIC ULCER OF LEFT LOWER LEG, LIMITED TO BREAKDOWN OF SKIN (HCC): ICD-10-CM

## 2024-07-03 PROCEDURE — 29581 APPL MULTLAYER CMPRN SYS LEG: CPT

## 2024-07-03 NOTE — FLOWSHEET NOTE
Multilayer Compression Wrap   (Not Unna) Below the Knee    NAME:  Cristal Hensley  YOB: 1940  MEDICAL RECORD NUMBER:  [id number]  DATE:  7/3/2024    Multilayer compression wrap: Removed old Multilayer wrap if indicated and wash leg with mild soap/water.  Applied moisturizing agent to dry skin as needed.   Applied primary and secondary dressing as ordered.  Applied multilayered dressing below the knee to right lower leg.  Instructed patient/caregiver not to remove dressing and to keep it clean and dry.   Instructed patient/caregiver on complications to report to provider, such as pain, numbness in toes, heavy drainage, and slippage of dressing.  Instructed patient on purpose of compression dressing and on activity and exercise recommendations.    Patient tolerated procedure well with no impairment to circulation noted.      Electronically signed by GRACIE ELDER RN on 7/3/2024 at 3:56 PM   07/03/24 1552   RLE Neurovascular Assessment   Capillary Refill Less than/Equal to 3 seconds   Color Appropriate for Ethnicity   Temperature Warm   R Pedal Pulse +2   LLE Neurovascular Assessment   Capillary Refill Less than/Equal to 3 seconds   Color Appropriate for Ethnicity   Temperature Warm   L Pedal Pulse +2   Wound 06/13/24 Leg Right;Lower   Date First Assessed/Time First Assessed: 06/13/24 1322   Primary Wound Type: Other (comment)  Location: Leg  Wound Location Orientation: Right;Lower   Wound Etiology Venous   Dressing Status Old drainage noted   Wound Cleansed Soap and water   Dressing/Treatment Xeroform;Gauze dressing/dressing sponge  (2 layer calamine wrap)   Wound Assessment Pink/red   Drainage Amount Scant (moist but unmeasurable)   Drainage Description Serosanguinous   Odor None   Rosita-wound Assessment Fragile;Hemosiderin staining (brown yellow)   Margins Flat/open edges   Wound Thickness Description not for Pressure Injury Partial thickness

## 2024-07-10 ENCOUNTER — HOSPITAL ENCOUNTER (OUTPATIENT)
Facility: HOSPITAL | Age: 84
Discharge: HOME | End: 2024-07-10
Attending: INTERNAL MEDICINE
Payer: MEDICARE

## 2024-07-10 VITALS — SYSTOLIC BLOOD PRESSURE: 145 MMHG | RESPIRATION RATE: 18 BRPM | TEMPERATURE: 98.2 F | DIASTOLIC BLOOD PRESSURE: 76 MMHG

## 2024-07-10 DIAGNOSIS — L97.911 NON-PRESSURE CHRONIC ULCER OF RIGHT LOWER LEG, LIMITED TO BREAKDOWN OF SKIN (HCC): ICD-10-CM

## 2024-07-10 DIAGNOSIS — L97.921 NON-PRESSURE CHRONIC ULCER OF LEFT LOWER LEG, LIMITED TO BREAKDOWN OF SKIN (HCC): Primary | ICD-10-CM

## 2024-07-10 PROCEDURE — 99213 OFFICE O/P EST LOW 20 MIN: CPT | Performed by: SURGERY

## 2024-07-10 PROCEDURE — 29581 APPL MULTLAYER CMPRN SYS LEG: CPT

## 2024-07-10 RX ORDER — LIDOCAINE HYDROCHLORIDE 20 MG/ML
JELLY TOPICAL ONCE
OUTPATIENT
Start: 2024-07-10 | End: 2024-07-10

## 2024-07-10 RX ORDER — SODIUM CHLOR/HYPOCHLOROUS ACID 0.033 %
SOLUTION, IRRIGATION IRRIGATION ONCE
OUTPATIENT
Start: 2024-07-10 | End: 2024-07-10

## 2024-07-10 RX ORDER — IBUPROFEN 200 MG
TABLET ORAL ONCE
OUTPATIENT
Start: 2024-07-10 | End: 2024-07-10

## 2024-07-10 RX ORDER — GENTAMICIN SULFATE 1 MG/G
OINTMENT TOPICAL ONCE
OUTPATIENT
Start: 2024-07-10 | End: 2024-07-10

## 2024-07-10 RX ORDER — LIDOCAINE 50 MG/G
OINTMENT TOPICAL ONCE
OUTPATIENT
Start: 2024-07-10 | End: 2024-07-10

## 2024-07-10 RX ORDER — TRIAMCINOLONE ACETONIDE 1 MG/G
OINTMENT TOPICAL ONCE
OUTPATIENT
Start: 2024-07-10 | End: 2024-07-10

## 2024-07-10 RX ORDER — BETAMETHASONE DIPROPIONATE 0.5 MG/G
CREAM TOPICAL ONCE
OUTPATIENT
Start: 2024-07-10 | End: 2024-07-10

## 2024-07-10 RX ORDER — LIDOCAINE HYDROCHLORIDE 40 MG/ML
SOLUTION TOPICAL ONCE
OUTPATIENT
Start: 2024-07-10 | End: 2024-07-10

## 2024-07-10 RX ORDER — CLOBETASOL PROPIONATE 0.5 MG/G
OINTMENT TOPICAL ONCE
OUTPATIENT
Start: 2024-07-10 | End: 2024-07-10

## 2024-07-10 RX ORDER — BACITRACIN ZINC AND POLYMYXIN B SULFATE 500; 1000 [USP'U]/G; [USP'U]/G
OINTMENT TOPICAL ONCE
OUTPATIENT
Start: 2024-07-10 | End: 2024-07-10

## 2024-07-10 RX ORDER — GINSENG 100 MG
CAPSULE ORAL ONCE
OUTPATIENT
Start: 2024-07-10 | End: 2024-07-10

## 2024-07-10 RX ORDER — LIDOCAINE 40 MG/G
CREAM TOPICAL ONCE
OUTPATIENT
Start: 2024-07-10 | End: 2024-07-10

## 2024-07-10 NOTE — PROGRESS NOTES
Wound care    The patient is an 83-year-old woman followed at the wound care center by Dr. Orosco regarding ulceration of the right lower leg.    Prior dressing: Cover wound right lower leg with Xeroform and ABD and 2 layer compression wrap from base of toes to upper calf        Physical examination.    Vital signs blood pressure 145/76 temperature 98.2 respirations 18    The patient is an alert elderly woman in no acute distress.    Examination of the right lower extremity revealed 2+ right posterior tibial pulse.  I did not palpate a dorsalis pedis pulse.  There is minimal trace edema of the right lower leg.  There is hyperpigmentation of the skin of the right lower leg.  There is superficial ulceration on the mid medial aspect of the right lower leg with dimensions 0.1 x 0.1 x 0.1 cm.  There is minimal scabbing and several areas laterally.      Impression:    Nonpressure ulcer right lower leg limited to skin breakdown        Plan:    Cover wound with Xeroform and dry gauze.  Apply 2 layer compression wrap with calamine from base of toes to upper calf.    The patient will follow-up in the wound care center in 1 week for a nurse visit for dressing change.    The patient will follow-up in the wound care center in 2 weeks and will see Dr. Orosco.        Diagnosis: Nonpressure ulcer right lower leg limited to skin breakdown    L97.911      Rachel Moran MD

## 2024-07-10 NOTE — FLOWSHEET NOTE
07/10/24 1437   Wound 06/13/24 Leg Right;Lower   Date First Assessed/Time First Assessed: 06/13/24 1322   Primary Wound Type: Other (comment)  Location: Leg  Wound Location Orientation: Right;Lower   Dressing Status New dressing applied   Dressing/Treatment Xeroform;ABD  (2 layer with calamine)     Multilayer Compression Wrap  (Not Unna) Below the Knee    NAME:  Cristal Hensley  YOB: 1940  MEDICAL RECORD NUMBER:  [id number]  DATE:  7/10/2024    Removed old Multilayer wrap if indicated and wash leg with mild soap/water.  Applied moisturizing agent to dry skin as needed.   Applied primary and secondary dressing as ordered.  Applied multilayered dressing below the knee to Right lower leg.  Instructed patient/caregiver not to remove dressing and to keep it clean and dry.   Instructed patient/caregiver on complications to report to provider, such as pain, numbness in toes, heavy drainage, and slippage of dressing.  Instructed patient on purpose of compression dressing and on activity and exercise recommendations.    Patient tolerated procedure well with no impairment to circulation noted.    Electronically signed by Britt Taylor RN on 7/10/2024 at 2:48 PM

## 2024-07-10 NOTE — FLOWSHEET NOTE
07/10/24 1413   Right Leg Edema Point of Measurement   Leg circumference 31.3 cm   Ankle circumference 19.9 cm   Compression Therapy 2 layer compression wrap   Left Leg Edema Point of Measurement   Compression Therapy Compression stockings   RLE Neurovascular Assessment   Capillary Refill Less than/Equal to 3 seconds   Color Appropriate for Ethnicity   Temperature Warm   R Pedal Pulse +2   Wound 06/13/24 Leg Right;Lower   Date First Assessed/Time First Assessed: 06/13/24 1322   Primary Wound Type: Other (comment)  Location: Leg  Wound Location Orientation: Right;Lower   Wound Image    Wound Etiology Venous   Dressing Status Intact   Wound Cleansed Soap and water   Wound Length (cm) 0.1 cm   Wound Width (cm) 0.1 cm   Wound Depth (cm) 0.1 cm   Wound Surface Area (cm^2) 0.01 cm^2   Change in Wound Size % (l*w) 100   Wound Volume (cm^3) 0.001 cm^3   Wound Healing % 100   Wound Assessment Epithelialization   Drainage Amount None (dry)   Odor None   Rosita-wound Assessment Fragile   Margins Flat/open edges   Wound Thickness Description not for Pressure Injury Partial thickness   Pain Assessment   Pain Assessment None - Denies Pain     Temp 98.2 °F (36.8 °C) (Temporal)   Resp 18

## 2024-07-10 NOTE — DISCHARGE INSTRUCTIONS
Discharge Instructions [facility]  [address]   [address]  [place] [address]   Telephone: [phone]     FAX [phone]    NAME:  Cristal Hensley  YOB: 1940  MEDICAL RECORD NUMBER:  [id number]  DATE:  7/10/2024    CPT code:Multilayer compression wrap (76764)    WOUND CARE ORDERS:  Right lower leg wounds :Cleanse with soap and water , apply primary dressing Xeroform  covered with secondary dressing ABD.  Apply 2 layer compression wrap with calamine Pt./pcg/HH nurse to change (freq) Once a week in clinic  and as needed for compromise.Follow up with provider in 2 Week(s) and nurse visit next week.   Home Health Agency: none  TREATMENT ORDERS:    Elevate leg(s) above the level of the heart when sitting.   Avoid prolonged standing in one place.  Do no get dressing/wrap wet.  Follow Diet as prescribed:   [] Diet as tolerated: [] Calorie Diabetic Diet: Low carb and no Sugar [] No Added Salt:no more then 2,000 mg daily  [] Increase Protein: [] Limit the amount of liquid you are drinking and avoid drinking in between meals (limit to 2 quarts daily)     Return Appointment:  [x] Return Appointment: With Dr. Orosco in  2 Week(s)  [x] Nurse Visit : 6-9 days  [] Ordered tests:    Electronically signed Britt Taylor RN on 7/10/2024 at 2:35 PM     Wound Care Center Information: Should you experience any significant changes in your wound(s) or have questions about your wound care, please contact the [facility] at MONDAY - FRIDAY 8:00 am - 4:30.  If you need help with your wound outside these hours and cannot wait until we are again available, contact your PCP or go to the hospital emergency room.   PLEASE NOTE: IF YOU ARE UNABLE TO OBTAIN WOUND SUPPLIES, CONTINUE TO USE THE SUPPLIES YOU HAVE AVAILABLE UNTIL YOU ARE ABLE TO REACH US. IT IS MOST IMPORTANT TO KEEP THE WOUND COVERED AT ALL TIMES.     Physician Signature:_______________________    Date:

## 2024-07-19 ENCOUNTER — HOSPITAL ENCOUNTER (OUTPATIENT)
Facility: HOSPITAL | Age: 84
Discharge: HOME | End: 2024-07-19
Attending: INTERNAL MEDICINE
Payer: MEDICARE

## 2024-07-19 VITALS
DIASTOLIC BLOOD PRESSURE: 58 MMHG | RESPIRATION RATE: 16 BRPM | HEART RATE: 91 BPM | TEMPERATURE: 97 F | SYSTOLIC BLOOD PRESSURE: 100 MMHG

## 2024-07-19 DIAGNOSIS — L97.921 NON-PRESSURE CHRONIC ULCER OF LEFT LOWER LEG, LIMITED TO BREAKDOWN OF SKIN (HCC): Primary | ICD-10-CM

## 2024-07-19 DIAGNOSIS — L97.911 NON-PRESSURE CHRONIC ULCER OF RIGHT LOWER LEG, LIMITED TO BREAKDOWN OF SKIN (HCC): ICD-10-CM

## 2024-07-19 PROCEDURE — 29581 APPL MULTLAYER CMPRN SYS LEG: CPT

## 2024-07-19 RX ORDER — GINSENG 100 MG
CAPSULE ORAL ONCE
OUTPATIENT
Start: 2024-07-19 | End: 2024-07-19

## 2024-07-19 RX ORDER — IBUPROFEN 200 MG
TABLET ORAL ONCE
OUTPATIENT
Start: 2024-07-19 | End: 2024-07-19

## 2024-07-19 RX ORDER — ERGOCALCIFEROL 1.25 MG/1
50000 CAPSULE ORAL WEEKLY
COMMUNITY
Start: 2024-07-13

## 2024-07-19 RX ORDER — CLOBETASOL PROPIONATE 0.5 MG/G
OINTMENT TOPICAL ONCE
OUTPATIENT
Start: 2024-07-19 | End: 2024-07-19

## 2024-07-19 RX ORDER — SODIUM CHLOR/HYPOCHLOROUS ACID 0.033 %
SOLUTION, IRRIGATION IRRIGATION ONCE
OUTPATIENT
Start: 2024-07-19 | End: 2024-07-19

## 2024-07-19 RX ORDER — BACITRACIN ZINC AND POLYMYXIN B SULFATE 500; 1000 [USP'U]/G; [USP'U]/G
OINTMENT TOPICAL ONCE
OUTPATIENT
Start: 2024-07-19 | End: 2024-07-19

## 2024-07-19 RX ORDER — LIDOCAINE 40 MG/G
CREAM TOPICAL ONCE
OUTPATIENT
Start: 2024-07-19 | End: 2024-07-19

## 2024-07-19 RX ORDER — ATORVASTATIN CALCIUM 20 MG/1
20 TABLET, FILM COATED ORAL DAILY
COMMUNITY
Start: 2024-07-10

## 2024-07-19 RX ORDER — LIDOCAINE HYDROCHLORIDE 20 MG/ML
JELLY TOPICAL ONCE
OUTPATIENT
Start: 2024-07-19 | End: 2024-07-19

## 2024-07-19 RX ORDER — LIDOCAINE 50 MG/G
OINTMENT TOPICAL ONCE
OUTPATIENT
Start: 2024-07-19 | End: 2024-07-19

## 2024-07-19 RX ORDER — GENTAMICIN SULFATE 1 MG/G
OINTMENT TOPICAL ONCE
OUTPATIENT
Start: 2024-07-19 | End: 2024-07-19

## 2024-07-19 RX ORDER — TRIAMCINOLONE ACETONIDE 1 MG/G
OINTMENT TOPICAL ONCE
OUTPATIENT
Start: 2024-07-19 | End: 2024-07-19

## 2024-07-19 RX ORDER — SERTRALINE HYDROCHLORIDE 25 MG/1
25 TABLET, FILM COATED ORAL DAILY
COMMUNITY
Start: 2024-07-10

## 2024-07-19 RX ORDER — LIDOCAINE HYDROCHLORIDE 40 MG/ML
SOLUTION TOPICAL ONCE
OUTPATIENT
Start: 2024-07-19 | End: 2024-07-19

## 2024-07-19 RX ORDER — BETAMETHASONE DIPROPIONATE 0.5 MG/G
CREAM TOPICAL ONCE
OUTPATIENT
Start: 2024-07-19 | End: 2024-07-19

## 2024-07-19 NOTE — FLOWSHEET NOTE
Multilayer Compression Wrap  (Not Unna) Below the Knee    NAME:  Cristal Hensley  YOB: 1940  MEDICAL RECORD NUMBER:  [id number]  DATE:  7/19/2024    Removed old Multilayer wrap if indicated and wash leg with mild soap/water.  Applied moisturizing agent to dry skin as needed.   Applied primary and secondary dressing as ordered.  Applied multilayered dressing below the knee to right lower leg.  Instructed patient/caregiver not to remove dressing and to keep it clean and dry.   Instructed patient/caregiver on complications to report to provider, such as pain, numbness in toes, heavy drainage, and slippage of dressing.  Instructed patient on purpose of compression dressing and on activity and exercise recommendations.    Patient tolerated procedure well with no impairment to circulation noted.    Electronically signed by Debbie Wang RN on 7/19/2024 at 11:59 AM

## 2024-07-19 NOTE — FLOWSHEET NOTE
07/19/24 1154   Right Leg Edema Point of Measurement   Compression Therapy 2 layer compression wrap   Left Leg Edema Point of Measurement   Compression Therapy Compression stockings   RLE Neurovascular Assessment   Capillary Refill Less than/Equal to 3 seconds   Color Appropriate for Ethnicity   Temperature Warm   R Pedal Pulse +2   Wound 06/13/24 Leg Right;Lower   Date First Assessed/Time First Assessed: 06/13/24 1322   Primary Wound Type: Other (comment)  Location: Leg  Wound Location Orientation: Right;Lower   Wound Etiology Venous   Dressing Status Old drainage noted   Wound Cleansed Soap and water   Wound Assessment Pink/red;Epithelialization   Drainage Amount Scant (moist but unmeasurable)   Drainage Description Serous   Odor None   Rosita-wound Assessment Fragile   Margins Flat/open edges   Wound Thickness Description not for Pressure Injury Partial thickness   Pain Assessment   Pain Assessment None - Denies Pain     BP (!) 100/58   Pulse 91   Temp 97 °F (36.1 °C) (Temporal)   Resp 16

## 2024-07-24 ENCOUNTER — HOSPITAL ENCOUNTER (OUTPATIENT)
Facility: HOSPITAL | Age: 84
Discharge: HOME | End: 2024-07-24
Attending: INTERNAL MEDICINE
Payer: MEDICARE

## 2024-07-24 VITALS
SYSTOLIC BLOOD PRESSURE: 114 MMHG | DIASTOLIC BLOOD PRESSURE: 65 MMHG | HEART RATE: 79 BPM | TEMPERATURE: 97.8 F | RESPIRATION RATE: 16 BRPM

## 2024-07-24 DIAGNOSIS — L97.911 NON-PRESSURE CHRONIC ULCER OF RIGHT LOWER LEG, LIMITED TO BREAKDOWN OF SKIN (HCC): ICD-10-CM

## 2024-07-24 DIAGNOSIS — L97.921 NON-PRESSURE CHRONIC ULCER OF LEFT LOWER LEG, LIMITED TO BREAKDOWN OF SKIN (HCC): Primary | ICD-10-CM

## 2024-07-24 PROCEDURE — 29581 APPL MULTLAYER CMPRN SYS LEG: CPT

## 2024-07-24 PROCEDURE — 99213 OFFICE O/P EST LOW 20 MIN: CPT | Performed by: SURGERY

## 2024-07-24 RX ORDER — LIDOCAINE 40 MG/G
CREAM TOPICAL ONCE
Status: CANCELLED | OUTPATIENT
Start: 2024-07-24 | End: 2024-07-24

## 2024-07-24 RX ORDER — LIDOCAINE HYDROCHLORIDE 20 MG/ML
JELLY TOPICAL ONCE
Status: CANCELLED | OUTPATIENT
Start: 2024-07-24 | End: 2024-07-24

## 2024-07-24 RX ORDER — LIDOCAINE HYDROCHLORIDE 40 MG/ML
SOLUTION TOPICAL ONCE
Status: CANCELLED | OUTPATIENT
Start: 2024-07-24 | End: 2024-07-24

## 2024-07-24 RX ORDER — GINSENG 100 MG
CAPSULE ORAL ONCE
OUTPATIENT
Start: 2024-07-24 | End: 2024-07-24

## 2024-07-24 RX ORDER — LIDOCAINE 40 MG/G
CREAM TOPICAL ONCE
OUTPATIENT
Start: 2024-07-24 | End: 2024-07-24

## 2024-07-24 RX ORDER — BACITRACIN ZINC AND POLYMYXIN B SULFATE 500; 1000 [USP'U]/G; [USP'U]/G
OINTMENT TOPICAL ONCE
OUTPATIENT
Start: 2024-07-24 | End: 2024-07-24

## 2024-07-24 RX ORDER — LIDOCAINE HYDROCHLORIDE 20 MG/ML
JELLY TOPICAL ONCE
OUTPATIENT
Start: 2024-07-24 | End: 2024-07-24

## 2024-07-24 RX ORDER — LIDOCAINE 50 MG/G
OINTMENT TOPICAL ONCE
Status: CANCELLED | OUTPATIENT
Start: 2024-07-24 | End: 2024-07-24

## 2024-07-24 RX ORDER — GINSENG 100 MG
CAPSULE ORAL ONCE
Status: CANCELLED | OUTPATIENT
Start: 2024-07-24 | End: 2024-07-24

## 2024-07-24 RX ORDER — IBUPROFEN 200 MG
TABLET ORAL ONCE
OUTPATIENT
Start: 2024-07-24 | End: 2024-07-24

## 2024-07-24 RX ORDER — IBUPROFEN 200 MG
TABLET ORAL ONCE
Status: CANCELLED | OUTPATIENT
Start: 2024-07-24 | End: 2024-07-24

## 2024-07-24 RX ORDER — TRIAMCINOLONE ACETONIDE 1 MG/G
OINTMENT TOPICAL ONCE
OUTPATIENT
Start: 2024-07-24 | End: 2024-07-24

## 2024-07-24 RX ORDER — LIDOCAINE HYDROCHLORIDE 40 MG/ML
SOLUTION TOPICAL ONCE
OUTPATIENT
Start: 2024-07-24 | End: 2024-07-24

## 2024-07-24 RX ORDER — TRIAMCINOLONE ACETONIDE 1 MG/G
OINTMENT TOPICAL ONCE
Status: CANCELLED | OUTPATIENT
Start: 2024-07-24 | End: 2024-07-24

## 2024-07-24 RX ORDER — BETAMETHASONE DIPROPIONATE 0.5 MG/G
CREAM TOPICAL ONCE
Status: CANCELLED | OUTPATIENT
Start: 2024-07-24 | End: 2024-07-24

## 2024-07-24 RX ORDER — CLOBETASOL PROPIONATE 0.5 MG/G
OINTMENT TOPICAL ONCE
Status: CANCELLED | OUTPATIENT
Start: 2024-07-24 | End: 2024-07-24

## 2024-07-24 RX ORDER — BACITRACIN ZINC AND POLYMYXIN B SULFATE 500; 1000 [USP'U]/G; [USP'U]/G
OINTMENT TOPICAL ONCE
Status: CANCELLED | OUTPATIENT
Start: 2024-07-24 | End: 2024-07-24

## 2024-07-24 RX ORDER — SODIUM CHLOR/HYPOCHLOROUS ACID 0.033 %
SOLUTION, IRRIGATION IRRIGATION ONCE
Status: CANCELLED | OUTPATIENT
Start: 2024-07-24 | End: 2024-07-24

## 2024-07-24 RX ORDER — GENTAMICIN SULFATE 1 MG/G
OINTMENT TOPICAL ONCE
Status: CANCELLED | OUTPATIENT
Start: 2024-07-24 | End: 2024-07-24

## 2024-07-24 RX ORDER — LIDOCAINE 50 MG/G
OINTMENT TOPICAL ONCE
OUTPATIENT
Start: 2024-07-24 | End: 2024-07-24

## 2024-07-24 RX ORDER — GENTAMICIN SULFATE 1 MG/G
OINTMENT TOPICAL ONCE
OUTPATIENT
Start: 2024-07-24 | End: 2024-07-24

## 2024-07-24 RX ORDER — SODIUM CHLOR/HYPOCHLOROUS ACID 0.033 %
SOLUTION, IRRIGATION IRRIGATION ONCE
OUTPATIENT
Start: 2024-07-24 | End: 2024-07-24

## 2024-07-24 RX ORDER — CLOBETASOL PROPIONATE 0.5 MG/G
OINTMENT TOPICAL ONCE
OUTPATIENT
Start: 2024-07-24 | End: 2024-07-24

## 2024-07-24 RX ORDER — BETAMETHASONE DIPROPIONATE 0.5 MG/G
CREAM TOPICAL ONCE
OUTPATIENT
Start: 2024-07-24 | End: 2024-07-24

## 2024-07-24 NOTE — FLOWSHEET NOTE
07/24/24 1421   Right Leg Edema Point of Measurement   Leg circumference 31.3 cm   Ankle circumference 19.6 cm   Compression Therapy 2 layer compression wrap   Left Leg Edema Point of Measurement   Compression Therapy Compression stockings   RLE Neurovascular Assessment   Capillary Refill Less than/Equal to 3 seconds   Color Appropriate for Ethnicity   Temperature Warm   R Pedal Pulse +2   Wound 06/13/24 Leg Right;Lower   Date First Assessed/Time First Assessed: 06/13/24 1322   Primary Wound Type: Other (comment)  Location: Leg  Wound Location Orientation: Right;Lower   Wound Image     Wound Etiology Venous   Dressing Status Old drainage noted   Wound Cleansed Soap and water   Wound Length (cm) 0.3 cm   Wound Width (cm) 0.4 cm   Wound Depth (cm) 0.1 cm   Wound Surface Area (cm^2) 0.12 cm^2   Change in Wound Size % (l*w) 99.97   Wound Volume (cm^3) 0.012 cm^3   Wound Healing % 100   Wound Assessment Pink/red;Epithelialization   Drainage Amount Small (< 25%)   Drainage Description Serous   Odor None   Rosita-wound Assessment Fragile   Margins Flat/open edges   Wound Thickness Description not for Pressure Injury Partial thickness   Pain Assessment   Pain Assessment None - Denies Pain       /65   Pulse 79   Temp 97.8 °F (36.6 °C) (Temporal)   Resp 16

## 2024-07-24 NOTE — FLOWSHEET NOTE
Multilayer Compression Wrap  (Not Unna) Below the Knee    NAME:  Cristal Hensley  YOB: 1940  MEDICAL RECORD NUMBER:  [id number]  DATE:  7/24/2024    Removed old Multilayer wrap if indicated and wash leg with mild soap/water.  Applied moisturizing agent to dry skin as needed.   Applied primary and secondary dressing as ordered.  Applied multilayered dressing below the knee to right lower leg.  Instructed patient/caregiver not to remove dressing and to keep it clean and dry.   Instructed patient/caregiver on complications to report to provider, such as pain, numbness in toes, heavy drainage, and slippage of dressing.  Instructed patient on purpose of compression dressing and on activity and exercise recommendations.    Patient tolerated procedure well with no impairment to circulation noted.    Electronically signed by Debbie Wang RN on 7/24/2024 at 3:03 PM

## 2024-07-24 NOTE — PROGRESS NOTES
Wound care     The patient is an 83-year-old woman followed at the wound care center by Dr. Orosco regarding ulceration of the right lower leg.    I first saw the patient at the wound care center on 7/10/2024.    The patient has had venous testing at [facility] and vascular by Dr. Montejo.  Results have been requested.     Prior dressing: Cover wound right lower leg with Xeroform and ABD and 2 layer compression wrap from base of toes to upper calf           Physical examination.     The patient is an alert elderly woman in no acute distress.     Examination of the right lower extremity revealed 2+ right posterior tibial pulse.  I did not palpate a dorsalis pedis pulse.  There is minimal trace edema of the right lower leg.  There is hyperpigmentation of the skin of the right lower leg.  There is superficial ulceration on the mid medial aspect of the right lower leg with dimensions 0.1 x 0.1 x 0.1 cm which was scabbed.  There is minimal scabbing in several areas laterally.  There was a new superficial open site posteriorly 0.3 x 0.4 x 0.1 cm with pink base.        Impression:     Nonpressure ulcer right lower leg limited to skin breakdown           Plan:    Decrease compression to 0.50 stretch:     Cover wound with Xeroform and ABD.  Apply 2 layer compression wrap with calamine from base of toes to upper calf at 0.50 stretch.    Results of vascular testing are pending.     The patient will follow-up in the wound care center in 1 week.           Diagnosis: Nonpressure ulcer right lower leg limited to skin breakdown     L97.911        Rachel Moran MD

## 2024-07-24 NOTE — DISCHARGE INSTRUCTIONS
Discharge Instructions [facility]  [address]   [address]  [place] [address]   Telephone: [phone]     FAX [phone]    NAME:  Cristal Hensley  YOB: 1940  MEDICAL RECORD NUMBER:  [id number]  DATE:  7/24/2024    CPT code:Multilayer compression wrap (80613)    WOUND CARE ORDERS:  Right lower leg :Cleanse with soap and water , apply primary dressing Xeroform  covered with secondary dressing ABD.  Apply 2 layer compression wrap with calamine Pt./pcg/HH nurse to change (freq) Once a week in clinic  and as needed for compromise.Follow up with provider in 1 Week(s).   Home Health Agency: None  TREATMENT ORDERS:    Elevate leg(s) above the level of the heart when sitting.   Avoid prolonged standing in one place.  Do no get dressing/wrap wet.  Follow Diet as prescribed:   [] Diet as tolerated: [] Calorie Diabetic Diet: Low carb and no Sugar [] No Added Salt:no more then 2,000 mg daily  [] Increase Protein: [] Limit the amount of liquid you are drinking and avoid drinking in between meals (limit to 2 quarts daily)     Return Appointment:  [x] Return Appointment: With Dr Moran in  1 Week(s)  [] Nurse Visit :   [] Ordered tests:    Electronically signed Debbie Wang RN on 7/24/2024 at 2:52 PM     Wound Care Center Information: Should you experience any significant changes in your wound(s) or have questions about your wound care, please contact the [facility] at MONDAY - FRIDAY 8:00 am - 4:30.  If you need help with your wound outside these hours and cannot wait until we are again available, contact your PCP or go to the hospital emergency room.   PLEASE NOTE: IF YOU ARE UNABLE TO OBTAIN WOUND SUPPLIES, CONTINUE TO USE THE SUPPLIES YOU HAVE AVAILABLE UNTIL YOU ARE ABLE TO REACH US. IT IS MOST IMPORTANT TO KEEP THE WOUND COVERED AT ALL TIMES.     Physician Signature:_______________________    Date: ___________ Time:  ____________

## 2024-08-01 ENCOUNTER — HOSPITAL ENCOUNTER (OUTPATIENT)
Facility: HOSPITAL | Age: 84
Discharge: HOME | End: 2024-08-01
Attending: INTERNAL MEDICINE
Payer: MEDICARE

## 2024-08-01 VITALS
RESPIRATION RATE: 16 BRPM | DIASTOLIC BLOOD PRESSURE: 57 MMHG | TEMPERATURE: 97 F | SYSTOLIC BLOOD PRESSURE: 110 MMHG | HEART RATE: 113 BPM

## 2024-08-01 DIAGNOSIS — L97.911 NON-PRESSURE CHRONIC ULCER OF RIGHT LOWER LEG, LIMITED TO BREAKDOWN OF SKIN (HCC): Primary | ICD-10-CM

## 2024-08-01 PROCEDURE — 29581 APPL MULTLAYER CMPRN SYS LEG: CPT

## 2024-08-01 RX ORDER — LIDOCAINE HYDROCHLORIDE 40 MG/ML
SOLUTION TOPICAL ONCE
OUTPATIENT
Start: 2024-08-01 | End: 2024-08-01

## 2024-08-01 RX ORDER — LIDOCAINE HYDROCHLORIDE 20 MG/ML
JELLY TOPICAL ONCE
OUTPATIENT
Start: 2024-08-01 | End: 2024-08-01

## 2024-08-01 RX ORDER — GENTAMICIN SULFATE 1 MG/G
OINTMENT TOPICAL ONCE
OUTPATIENT
Start: 2024-08-01 | End: 2024-08-01

## 2024-08-01 RX ORDER — SODIUM CHLOR/HYPOCHLOROUS ACID 0.033 %
SOLUTION, IRRIGATION IRRIGATION ONCE
OUTPATIENT
Start: 2024-08-01 | End: 2024-08-01

## 2024-08-01 RX ORDER — BACITRACIN ZINC AND POLYMYXIN B SULFATE 500; 1000 [USP'U]/G; [USP'U]/G
OINTMENT TOPICAL ONCE
OUTPATIENT
Start: 2024-08-01 | End: 2024-08-01

## 2024-08-01 RX ORDER — BETAMETHASONE DIPROPIONATE 0.5 MG/G
CREAM TOPICAL ONCE
OUTPATIENT
Start: 2024-08-01 | End: 2024-08-01

## 2024-08-01 RX ORDER — LIDOCAINE 40 MG/G
CREAM TOPICAL ONCE
OUTPATIENT
Start: 2024-08-01 | End: 2024-08-01

## 2024-08-01 RX ORDER — CLOBETASOL PROPIONATE 0.5 MG/G
OINTMENT TOPICAL ONCE
OUTPATIENT
Start: 2024-08-01 | End: 2024-08-01

## 2024-08-01 RX ORDER — TRIAMCINOLONE ACETONIDE 1 MG/G
OINTMENT TOPICAL ONCE
OUTPATIENT
Start: 2024-08-01 | End: 2024-08-01

## 2024-08-01 RX ORDER — LIDOCAINE 50 MG/G
OINTMENT TOPICAL ONCE
OUTPATIENT
Start: 2024-08-01 | End: 2024-08-01

## 2024-08-01 RX ORDER — IBUPROFEN 200 MG
TABLET ORAL ONCE
OUTPATIENT
Start: 2024-08-01 | End: 2024-08-01

## 2024-08-01 RX ORDER — GINSENG 100 MG
CAPSULE ORAL ONCE
OUTPATIENT
Start: 2024-08-01 | End: 2024-08-01

## 2024-08-01 ASSESSMENT — PAIN DESCRIPTION - DESCRIPTORS: DESCRIPTORS: ACHING

## 2024-08-01 ASSESSMENT — PAIN SCALES - GENERAL: PAINLEVEL_OUTOF10: 5

## 2024-08-01 ASSESSMENT — PAIN DESCRIPTION - FREQUENCY: FREQUENCY: INTERMITTENT

## 2024-08-01 ASSESSMENT — PAIN DESCRIPTION - ORIENTATION: ORIENTATION: RIGHT;LEFT

## 2024-08-01 ASSESSMENT — PAIN - FUNCTIONAL ASSESSMENT: PAIN_FUNCTIONAL_ASSESSMENT: PREVENTS OR INTERFERES SOME ACTIVE ACTIVITIES AND ADLS

## 2024-08-01 ASSESSMENT — PAIN DESCRIPTION - ONSET: ONSET: GRADUAL

## 2024-08-01 ASSESSMENT — PAIN DESCRIPTION - PAIN TYPE: TYPE: CHRONIC PAIN

## 2024-08-01 ASSESSMENT — PAIN DESCRIPTION - LOCATION: LOCATION: LEG

## 2024-08-01 NOTE — FLOWSHEET NOTE
08/01/24 1427   Wound 06/13/24 Leg Right;Lower   Date First Assessed/Time First Assessed: 06/13/24 1322   Primary Wound Type: Other (comment)  Location: Leg  Wound Location Orientation: Right;Lower   Dressing Status New dressing applied   Dressing/Treatment Xeroform;ABD  (2 layer with calamine)     Multilayer Compression Wrap  (Not Unna) Below the Knee    NAME:  Cristal Hensley  YOB: 1940  MEDICAL RECORD NUMBER:  [id number]  DATE:  8/1/2024    Removed old Multilayer wrap if indicated and wash leg with mild soap/water.  Applied moisturizing agent to dry skin as needed.   Applied primary and secondary dressing as ordered.  Applied multilayered dressing below the knee to Right lower leg.  Instructed patient/caregiver not to remove dressing and to keep it clean and dry.   Instructed patient/caregiver on complications to report to provider, such as pain, numbness in toes, heavy drainage, and slippage of dressing.  Instructed patient on purpose of compression dressing and on activity and exercise recommendations.    Patient tolerated procedure well with no impairment to circulation noted.    Electronically signed by Britt Taylor RN on 8/1/2024 at 2:27 PM

## 2024-08-01 NOTE — DISCHARGE INSTRUCTIONS
Discharge Instructions/Wound Care Orders  [facility]   [address] [address], [address]  [address] [facility]  Telephone: [phone]     FAX [phone]     NAME:  Cristal Hensley  YOB: 1940  MEDICAL RECORD NUMBER:  [id number]  DATE:  8/1/2024    CPT Codes: Multilayer compression wrap (29349)    HOME HEALTH:     Wound Care Orders:  Right lower leg :Cleanse with normal saline, apply primary dressing Xeroform  cover with secondary dressing Gauze.  Apply 2 layer compression wrap with calamine  Pt./pcg/HH nurse to change (freq) Once a week in clinic  and as needed for compromise.F/U in 1 week.     Treatment Orders:   Elevate leg(s) above the level of the heart when sitting, if swelling present.  Avoid prolonged standing in one place.  Wear off-loading shoe/boot on the affected foot when walking.  Do not get dressing/cast wet.  Do not use objects to scratch inside cast/wrap.   Follow diet as prescribed:  [x] Diet as tolerated: [] Diabetic Diet: Low carb no sugar [x] No Added Salt:  [] Increase Protein: [] Other:Limit the amount of liquid you are drinking and avoid drinking in between meals             Follow-up:  [x] Return Appointment: With Dr. Orosco in  1 Week(s)  [] Ordered tests:    Electronically signed DANN MYERS RN on 8/1/2024 at 2:15 PM     Wound Care Center Information: Should you experience any significant changes in your wound(s) or have questions about your wound care, please contact the [facility] at MONDAY - FRIDAY 8:00 am - 4:30.  If you need help with your wound outside these hours and cannot wait until we are again available, contact your PCP or go to the hospital emergency room.     PLEASE NOTE: IF YOU ARE UNABLE TO OBTAIN WOUND SUPPLIES, CONTINUE TO USE THE SUPPLIES YOU HAVE AVAILABLE UNTIL YOU ARE ABLE TO REACH US. IT IS MOST IMPORTANT TO KEEP THE WOUND COVERED AT ALL TIMES.     Physician

## 2024-08-01 NOTE — FLOWSHEET NOTE
08/01/24 1321   Right Leg Edema Point of Measurement   Leg circumference 30.5 cm   Ankle circumference 19.4 cm   Compression Therapy 2 layer compression wrap   Left Leg Edema Point of Measurement   Compression Therapy Compression stockings   RLE Neurovascular Assessment   Capillary Refill Less than/Equal to 3 seconds   Color Appropriate for Ethnicity   Temperature Warm   R Pedal Pulse +2   Wound 06/13/24 Leg Right;Lower   Date First Assessed/Time First Assessed: 06/13/24 1322   Primary Wound Type: Other (comment)  Location: Leg  Wound Location Orientation: Right;Lower   Wound Image    Wound Etiology Venous   Dressing Status Old drainage noted   Wound Cleansed Soap and water;Cleansed with saline   Dressing/Treatment   (Xeroform, gauze, 2 layer with calamine)   Wound Length (cm) 0.1 cm   Wound Width (cm) 0.1 cm   Wound Depth (cm) 0.1 cm   Wound Surface Area (cm^2) 0.01 cm^2   Change in Wound Size % (l*w) 100   Wound Volume (cm^3) 0.001 cm^3   Wound Healing % 100   Wound Assessment Epithelialization   Drainage Amount Scant (moist but unmeasurable)   Drainage Description Serous   Odor None   Rosita-wound Assessment Fragile   Margins Flat/open edges   Wound Thickness Description not for Pressure Injury Partial thickness     BP (!) 110/57   Pulse (!) 113   Temp 97 °F (36.1 °C) (Temporal)   Resp 16

## 2024-08-02 NOTE — PROGRESS NOTES
Followup for venous stasis disease.  M's Guess has been doing well - as attested by her family. Thought we were almost ready to give up the wraps!  No intercurrent illnesses, systemic symptoms, new complaints or changes in meds.    Alert and conversant. A bit slow, perhaps, but she has been applying her own compression sock on the L leg.    .BP (!) 110/57   Pulse (!) 113   Temp 97 °F (36.1 °C) (Temporal)   Resp 16     Recent vascular studies were encouraging vis a vis the arterial side: LABI1.02. RABI 1.25 - no problems there. The R TBI was .89 and the L 0.7 - not too shabby. Those and the tolerance of wraps are encouraging.  The L leg is healed and the edema is essentially controlled.  The R leg edema is controlled and the wounds are almost closed - there is still a small posterior open area. There is significant purpura/hyperpigmentation, but seem improved overall.    \"Give me another week or two in wraps and we may be able to graduate to stockings.\" Especially since she seems to be able to handle them. No delta for now.  RTC 1-2 weeks  L97.921  L97.911  I87.3  L30.9

## 2024-08-08 ENCOUNTER — HOSPITAL ENCOUNTER (OUTPATIENT)
Facility: HOSPITAL | Age: 84
Discharge: HOME | End: 2024-08-08
Attending: INTERNAL MEDICINE
Payer: MEDICARE

## 2024-08-08 VITALS
DIASTOLIC BLOOD PRESSURE: 72 MMHG | TEMPERATURE: 97.1 F | HEART RATE: 85 BPM | SYSTOLIC BLOOD PRESSURE: 136 MMHG | RESPIRATION RATE: 18 BRPM

## 2024-08-08 DIAGNOSIS — L97.911 NON-PRESSURE CHRONIC ULCER OF RIGHT LOWER LEG, LIMITED TO BREAKDOWN OF SKIN (HCC): Primary | ICD-10-CM

## 2024-08-08 PROCEDURE — 99212 OFFICE O/P EST SF 10 MIN: CPT

## 2024-08-08 RX ORDER — LIDOCAINE HYDROCHLORIDE 20 MG/ML
JELLY TOPICAL ONCE
Status: CANCELLED | OUTPATIENT
Start: 2024-08-08 | End: 2024-08-08

## 2024-08-08 RX ORDER — BETAMETHASONE DIPROPIONATE 0.5 MG/G
CREAM TOPICAL ONCE
Status: CANCELLED | OUTPATIENT
Start: 2024-08-08 | End: 2024-08-08

## 2024-08-08 RX ORDER — BACITRACIN ZINC AND POLYMYXIN B SULFATE 500; 1000 [USP'U]/G; [USP'U]/G
OINTMENT TOPICAL ONCE
Status: CANCELLED | OUTPATIENT
Start: 2024-08-08 | End: 2024-08-08

## 2024-08-08 RX ORDER — IBUPROFEN 200 MG
TABLET ORAL ONCE
Status: CANCELLED | OUTPATIENT
Start: 2024-08-08 | End: 2024-08-08

## 2024-08-08 RX ORDER — LIDOCAINE 50 MG/G
OINTMENT TOPICAL ONCE
Status: CANCELLED | OUTPATIENT
Start: 2024-08-08 | End: 2024-08-08

## 2024-08-08 RX ORDER — GENTAMICIN SULFATE 1 MG/G
OINTMENT TOPICAL ONCE
Status: CANCELLED | OUTPATIENT
Start: 2024-08-08 | End: 2024-08-08

## 2024-08-08 RX ORDER — LIDOCAINE 40 MG/G
CREAM TOPICAL ONCE
Status: CANCELLED | OUTPATIENT
Start: 2024-08-08 | End: 2024-08-08

## 2024-08-08 RX ORDER — TRIAMCINOLONE ACETONIDE 1 MG/G
OINTMENT TOPICAL ONCE
Status: CANCELLED | OUTPATIENT
Start: 2024-08-08 | End: 2024-08-08

## 2024-08-08 RX ORDER — SODIUM CHLOR/HYPOCHLOROUS ACID 0.033 %
SOLUTION, IRRIGATION IRRIGATION ONCE
Status: CANCELLED | OUTPATIENT
Start: 2024-08-08 | End: 2024-08-08

## 2024-08-08 RX ORDER — GINSENG 100 MG
CAPSULE ORAL ONCE
Status: CANCELLED | OUTPATIENT
Start: 2024-08-08 | End: 2024-08-08

## 2024-08-08 RX ORDER — CLOBETASOL PROPIONATE 0.5 MG/G
OINTMENT TOPICAL ONCE
Status: CANCELLED | OUTPATIENT
Start: 2024-08-08 | End: 2024-08-08

## 2024-08-08 RX ORDER — LIDOCAINE HYDROCHLORIDE 40 MG/ML
SOLUTION TOPICAL ONCE
Status: CANCELLED | OUTPATIENT
Start: 2024-08-08 | End: 2024-08-08

## 2024-08-08 NOTE — DISCHARGE INSTRUCTIONS
Discharge Instructions/Wound Care Orders  [facility]  [address]   [address]  [place] [address]   Telephone: [phone]    FAX [phone]      NAME:  Cristal Hensley  YOB: 1940  MEDICAL RECORD NUMBER:  [id number]  DATE:  8/8/2024    CPT code: E&M-Level 2 (05019)    Congratulations!  You have completed your treatment.     Return to your Primary Care Physician for all your health issues.   Resume your ordinary activities as tolerated.   Take your medications as prescribed by your primary care physician.   Check your skin daily for cracks, bruises, sores, or dryness. Use a moisturizer as needed.   Clean and dry your skin, using mild soap and warm water (not hot).   Avoid alcohol and caffeine and do not smoke.  Maintain a nutritious diet.  Avoid pressure on your wound site. Keep your legs elevated above the level of the heart whenever possible.  Continue to use wraps/stockings/compression as prescribed.  Replace compression stockings every four to six months as needed to ensure proper fit.   Wear well-fitting shoes and leg garments.    THANK YOU FOR ALLOWING US TO SERVE YOU.  PLEASE CALL IF YOU DEVELOP ANOTHER WOUND.     Electronically signed by Britt Taylor RN on 8/8/2024 at 2:09 PM     Wound Care Center Information: Should you experience any significant changes in your wound(s) or have questions about your wound care, please contact the [facility] at MONDAY - FRIDAY 8:00 am - 4:30.  If you need help with your wound outside these hours and cannot wait until we are again available, contact your PCP or go to the hospital emergency room.     PLEASE NOTE: IF YOU ARE UNABLE TO OBTAIN WOUND SUPPLIES, CONTINUE TO USE THE SUPPLIES YOU HAVE AVAILABLE UNTIL YOU ARE ABLE TO REACH US. IT IS MOST IMPORTANT TO KEEP THE WOUND COVERED AT ALL TIMES.     Physician Signature:_______________________    Date: ___________ Time:  ____________

## 2024-08-08 NOTE — PROGRESS NOTES
Followup for venous stasis disease.  No intercurrent illnesses, systemic symptoms, new complaints or changes in meds.  Confirmed by son.  She brings her compression stockings.    Alert, oriented and conversant, tho' a bit slow in rejoinder.  /72   Pulse 85   Temp 97.1 °F (36.2 °C) (Temporal)   Resp 18   R leg has minimal edema (swellling above the prior wraps). Hyperpigmentation. No open wound.    Discharge with instructions for moisturization, elevation, protection and contact for issues.   Discussion of timing of donning and doffing socks.    RTC prn.    L97.921  L97.911  I87.3  L30.9

## 2024-08-08 NOTE — FLOWSHEET NOTE
08/08/24 1357   Right Leg Edema Point of Measurement   Leg circumference 30 cm   Ankle circumference 19 cm   Compression Therapy 2 layer compression wrap   RLE Neurovascular Assessment   Capillary Refill Less than/Equal to 3 seconds   Color Appropriate for Ethnicity   Temperature Warm   R Pedal Pulse +2   Wound 06/13/24 Leg Right;Lower   Date First Assessed/Time First Assessed: 06/13/24 1322   Primary Wound Type: Other (comment)  Location: Leg  Wound Location Orientation: Right;Lower   Wound Image    Wound Etiology Venous   Dressing Status Intact   Wound Cleansed Soap and water   Wound Length (cm) 0.1 cm   Wound Width (cm) 0.1 cm   Wound Depth (cm) 0.1 cm   Wound Surface Area (cm^2) 0.01 cm^2   Change in Wound Size % (l*w) 100   Wound Volume (cm^3) 0.001 cm^3   Wound Healing % 100   Wound Assessment Epithelialization   Drainage Amount None (dry)   Odor None   Rosita-wound Assessment Fragile   Pain Assessment   Pain Assessment None - Denies Pain     /72   Pulse 85   Temp 97.1 °F (36.2 °C) (Temporal)   Resp 18

## 2024-08-20 ENCOUNTER — HOSPITAL ENCOUNTER (OUTPATIENT)
Facility: HOSPITAL | Age: 84
Setting detail: OUTPATIENT SURGERY
Discharge: HOME | End: 2024-08-20
Attending: INTERNAL MEDICINE | Admitting: INTERNAL MEDICINE
Payer: MEDICARE

## 2024-08-20 ENCOUNTER — ANESTHESIA (OUTPATIENT)
Facility: HOSPITAL | Age: 84
End: 2024-08-20
Payer: MEDICARE

## 2024-08-20 ENCOUNTER — ANESTHESIA EVENT (OUTPATIENT)
Facility: HOSPITAL | Age: 84
End: 2024-08-20
Payer: MEDICARE

## 2024-08-20 VITALS
OXYGEN SATURATION: 97 % | WEIGHT: 130 LBS | SYSTOLIC BLOOD PRESSURE: 125 MMHG | BODY MASS INDEX: 21.66 KG/M2 | HEIGHT: 65 IN | HEART RATE: 84 BPM | DIASTOLIC BLOOD PRESSURE: 61 MMHG | TEMPERATURE: 98.9 F | RESPIRATION RATE: 16 BRPM

## 2024-08-20 LAB
GLUCOSE BLD STRIP.AUTO-MCNC: 103 MG/DL (ref 65–117)
SERVICE CMNT-IMP: NORMAL

## 2024-08-20 PROCEDURE — 7100000010 HC PHASE II RECOVERY - FIRST 15 MIN: Performed by: INTERNAL MEDICINE

## 2024-08-20 PROCEDURE — 3700000001 HC ADD 15 MINUTES (ANESTHESIA): Performed by: INTERNAL MEDICINE

## 2024-08-20 PROCEDURE — 88305 TISSUE EXAM BY PATHOLOGIST: CPT

## 2024-08-20 PROCEDURE — 3600007512: Performed by: INTERNAL MEDICINE

## 2024-08-20 PROCEDURE — 82962 GLUCOSE BLOOD TEST: CPT

## 2024-08-20 PROCEDURE — 3700000000 HC ANESTHESIA ATTENDED CARE: Performed by: INTERNAL MEDICINE

## 2024-08-20 PROCEDURE — 6360000002 HC RX W HCPCS: Performed by: NURSE ANESTHETIST, CERTIFIED REGISTERED

## 2024-08-20 PROCEDURE — 7100000011 HC PHASE II RECOVERY - ADDTL 15 MIN: Performed by: INTERNAL MEDICINE

## 2024-08-20 PROCEDURE — 2580000003 HC RX 258: Performed by: INTERNAL MEDICINE

## 2024-08-20 PROCEDURE — 3600007502: Performed by: INTERNAL MEDICINE

## 2024-08-20 PROCEDURE — 2500000003 HC RX 250 WO HCPCS: Performed by: NURSE ANESTHETIST, CERTIFIED REGISTERED

## 2024-08-20 PROCEDURE — 2709999900 HC NON-CHARGEABLE SUPPLY: Performed by: INTERNAL MEDICINE

## 2024-08-20 PROCEDURE — 88342 IMHCHEM/IMCYTCHM 1ST ANTB: CPT

## 2024-08-20 RX ORDER — OMEPRAZOLE 40 MG/1
40 CAPSULE, DELAYED RELEASE ORAL
Qty: 90 CAPSULE | Refills: 1 | Status: SHIPPED | OUTPATIENT
Start: 2024-08-20

## 2024-08-20 RX ORDER — SODIUM CHLORIDE 9 MG/ML
25 INJECTION, SOLUTION INTRAVENOUS PRN
Status: DISCONTINUED | OUTPATIENT
Start: 2024-08-20 | End: 2024-08-20 | Stop reason: HOSPADM

## 2024-08-20 RX ORDER — SODIUM CHLORIDE 0.9 % (FLUSH) 0.9 %
5-40 SYRINGE (ML) INJECTION PRN
Status: DISCONTINUED | OUTPATIENT
Start: 2024-08-20 | End: 2024-08-20 | Stop reason: HOSPADM

## 2024-08-20 RX ORDER — PHENYLEPHRINE HCL IN 0.9% NACL 0.4MG/10ML
SYRINGE (ML) INTRAVENOUS PRN
Status: DISCONTINUED | OUTPATIENT
Start: 2024-08-20 | End: 2024-08-20 | Stop reason: SDUPTHER

## 2024-08-20 RX ORDER — SODIUM CHLORIDE 0.9 % (FLUSH) 0.9 %
5-40 SYRINGE (ML) INJECTION EVERY 12 HOURS SCHEDULED
Status: DISCONTINUED | OUTPATIENT
Start: 2024-08-20 | End: 2024-08-20 | Stop reason: HOSPADM

## 2024-08-20 RX ORDER — EPHEDRINE SULFATE/0.9% NACL/PF 50 MG/5 ML
SYRINGE (ML) INTRAVENOUS PRN
Status: DISCONTINUED | OUTPATIENT
Start: 2024-08-20 | End: 2024-08-20 | Stop reason: SDUPTHER

## 2024-08-20 RX ADMIN — Medication 80 MCG: at 14:28

## 2024-08-20 RX ADMIN — Medication 200 MCG: at 14:24

## 2024-08-20 RX ADMIN — Medication 40 MCG: at 14:32

## 2024-08-20 RX ADMIN — Medication 10 MG: at 14:34

## 2024-08-20 RX ADMIN — LIDOCAINE HYDROCHLORIDE 100 MG: 20 INJECTION, SOLUTION EPIDURAL; INFILTRATION; INTRACAUDAL; PERINEURAL at 14:11

## 2024-08-20 RX ADMIN — PROPOFOL 20 MG: 10 INJECTION, EMULSION INTRAVENOUS at 14:17

## 2024-08-20 RX ADMIN — PROPOFOL 20 MG: 10 INJECTION, EMULSION INTRAVENOUS at 14:30

## 2024-08-20 RX ADMIN — PROPOFOL 80 MG: 10 INJECTION, EMULSION INTRAVENOUS at 14:11

## 2024-08-20 RX ADMIN — SODIUM CHLORIDE 25 ML: 9 INJECTION, SOLUTION INTRAVENOUS at 14:01

## 2024-08-20 RX ADMIN — PROPOFOL 20 MG: 10 INJECTION, EMULSION INTRAVENOUS at 14:21

## 2024-08-20 RX ADMIN — PROPOFOL 20 MG: 10 INJECTION, EMULSION INTRAVENOUS at 14:25

## 2024-08-20 ASSESSMENT — PAIN - FUNCTIONAL ASSESSMENT: PAIN_FUNCTIONAL_ASSESSMENT: NONE - DENIES PAIN

## 2024-08-20 NOTE — OP NOTE
[facility]                  Colonoscopy Operative Report    8/20/2024      Cristal Hensley  [id number]  1940    Procedure Type:   Colonoscopy --screening     Indications:    Diarrhea, Family history of coloretal cancer (screening only)     Pre-operative Diagnosis: see indication above    Post-operative Diagnosis:  See findings below    :  ALPA Lama Jr, MD    Referring Provider: Nick Lassiter MD      Sedation:  MAC anesthesia Propofol    Pre-Procedural Exam:      Airway: clear,  No airway problems anticipated  Heart: RRR, without gallops or rubs  Lungs: clear bilaterally without wheezes, crackles, or rhonchi  Abdomen: soft, nontender, nondistended, bowel sounds present  Mental Status: awake, alert and oriented to person, place and time     Procedure Details:  After informed consent was obtained with all risks and benefits of procedure explained and preoperative exam completed, the patient was taken to the endoscopy suite and placed in the left lateral decubitus position.  Upon sequential sedation as per above, a digital rectal exam was performed .  The Olympus videocolonoscope  was inserted in the rectum and carefully advanced to the cecum, which was identified by the ileocecal valve and appendiceal orifice.  The cecum was identified by the ileocecal valve and appendiceal orifice.  The quality of preparation was adequate.  The colonoscope was slowly withdrawn with careful evaluation between folds. Retroflexion in the rectum was completed demonstrating internal hemorrhoids.     Findings:   Rectum: Grade 1 internal hemorrhoid(s);  Sigmoid:     -Diverticulosis  Descending Colon:     -Diverticulosis  Transverse Colon: normal  Ascending Colon: normal  Cecum: normal  Terminal Ileum: not intubated    Random mucosal biopsies obtained.        Specimen Removed:   random    Complications: None.     EBL:  None.    Impression:    normal colonic mucosa

## 2024-08-20 NOTE — H&P
GELA Lama MD  [facility]  [address], [address]  [place] [address] [phone]  [email or web address]    Gastroenterology Outpatient History and Physical    Patient: Cristal BEARDEN Shellie    Physician: ALPA Lama MD    Vital Signs: Blood pressure (!) 98/56, pulse 93, temperature 98 °F (36.7 °C), temperature source Temporal, resp. rate 20, height 1.651 m (5' 5\"), weight 59 kg (130 lb), SpO2 97%.    Allergies:   Allergies   Allergen Reactions    Penicillins Anaphylaxis    Aspirin Other (See Comments)     ulcers    Cephalexin Hives       Chief Complaint: Screening colonoscopy, chronic diarrhea, FH colon cancer and GERD    History of Present Illness:        Cristal BEARDEN Shellie      The patient is a 83 year old female who presents for a screening colonscopy.The patient presents for a screening colonoscopy evaluation (Her last done in 2018 by Dr Calero.    Brother had colon cancer.    now having more issues with diarrhea.  Not having significant abdominal pain    Uses some gas-x and otc imodium---one to two a day  Appetite is ok  Weight stable).    Additional reasons for visit:    Diarrhea is described as the following:   The patient presents due to reoccurrence of symptoms (The patient presents for a screening colonoscopy evaluation (Her last done in 2018 by Dr Calero.    Brother had colon cancer.    now having more issues with diarrhea.  Not having significant abdominal pain    Uses some gas-x and otc imodium---one to two a day  Appetite is ok  Weight stable)       History:  Past Medical History:   Diagnosis Date    Chronic pain     GERD (gastroesophageal reflux disease)     Hypercholesteremia     Hypertension     Ill-defined condition     DJD    Ill-defined condition     Diverticulosis      Past Surgical History:   Procedure Laterality Date    DILATION AND CURETTAGE OF UTERUS      HEENT  2001    Oral surgery - gum disease    ORTHOPEDIC SURGERY Right 2006

## 2024-08-20 NOTE — PROGRESS NOTES
ARRIVAL INFORMATION:  Verified patient name and date of birth, scheduled procedure, and informed consent.     : Vijaya (daughter) contact number: [phone]  Physician and staff can share information with the .     Belongings with patient include:  Clothing, Cane (under stretcher)     GI FOCUSED ASSESSMENT:  Neuro: Awake, alert, oriented x4  Respiratory: even and unlabored   GI: soft and non-distended  EKG Rhythm: sinus tachycardia and PVC's    Education:Reviewed general discharge instructions and  information.      The risks and benefits of the bite block have been explained to patient.  Patient verbalizes understanding.

## 2024-08-20 NOTE — OP NOTE
[facility]                   Endoscopic Gastroduodenoscopy Procedure Note      8/20/2024  Cristal Hensley  1940  [id number]    Procedure: Endoscopic Gastroduodenoscopy with biopsy    Indication: GERD, chronic diarrhea      Pre-operative Diagnosis: see indication above    Post-operative Diagnosis: see findings below    : GELA Lama Jr. MD    Referring Provider:  Nick Lassiter MD      Anesthesia/Sedation:  MAC anesthesia Propofol    Airway assessment: No airway problems anticipated    Pre-Procedural Exam:      Airway: clear, no airway problems anticipated  Heart: RRR, without gallops or rubs  Lungs: clear bilaterally without wheezes, crackles, or rhonchi  Abdomen: soft, nontender, nondistended, bowel sounds present  Mental Status: awake, alert and oriented to person, place and time       Procedure Details     After infomed consent was obtained for the procedure, with all risks and benefits of procedure explained the patient was taken to the endoscopy suite and placed in the left lateral decubitus position.  Following sequential administration of sedation as per above, the endoscope was inserted into the mouth and advanced under direct vision to second portion of the duodenum.  A careful inspection was made as the gastroscope was withdrawn, including a retroflexed view of the proximal stomach; findings and interventions are described below.      Findings:   Esophagus: Upper and mid esophagus normal. Focal esophagitis at the GE junction, biopsied. Hiatal hernia  Stomach: mild antritis---biopsied  Duodenum: normal---biopsied    Therapies:  biopsy of esophagus  biopsy of stomach antrum  biopsy of duodenal second portion    Specimens:  1. Duodenal biopsies  2 gastric antral biopsies  3 GE junction biopsies           Complications:   None; patient tolerated the procedure well.    EBL:  None.            Impression:      Mild esophagitis  Hiatal hernia  Mild

## 2024-08-20 NOTE — DISCHARGE INSTRUCTIONS
Valuables sent to     COLON DISCHARGE INSTRUCTIONS  Discomfort:  Redness at IV site- apply warm compress to area; if redness or soreness persist- contact your physician  There may be a slight amount of blood passed from the rectum  Gaseous discomfort- walking, belching will help relieve any discomfort  You may not operate a vehicle for 12 hours  You may not engage in an occupation involving machinery or appliances for rest of today  You may not drink alcoholic beverages for at least 12 hours  Avoid making any critical decisions for at least 24 hour  DIET:   High fiber diet.   - however -  remember your colon is empty and a heavy meal will produce gas.   Avoid these foods:  vegetables, fried / greasy foods, carbonated drinks for today      ACTIVITY:  You may resume your normal daily activities it is recommended that you spend the remainder of the day resting -  avoid any strenuous activity.    CALL M.D.  ANY SIGN OF:   Increasing pain, nausea, vomiting  Abdominal distension (swelling)  New increased bleeding (oral or rectal)  Fever (chills)  Pain in chest area  Bloody discharge from nose or mouth  Shortness of breath     COLONOSCOPY FINDINGS:  Your colonoscopy showed: diverticulosis and hemorrhoids. No polyps found.    Took some random biopsies to check for microscopic colitis.    Follow-up Instructions:   Call Dr. ALPA Lama if any questions or problems.   Telephone # [phone]  Dr. Lama's office will notify you of the biopsy results within 7 to 10 days.    Patient Education on Sedation / Analgesia Administered for Procedure      For 24 hours after general anesthesia or intravenous analgesia / sedation:  Have someone responsible help you with your care  Limit your activities  Do not drive and operate hazardous machinery  Do not make important personal, legal or business decisions  Do not drink alcoholic beverages  If you have not urinated within 8 hours after discharge, please contact your

## 2024-08-20 NOTE — PROGRESS NOTES
Endoscopy Case End Note:     EGD: 1418: Procedure scope was pre-cleaned, per protocol, at bedside by LEXII Fallon.       COLON: 1443: Procedure scope was pre-cleaned, per protocol, at bedside by LEXII Fallon.      Report received from anesthesia.  See anesthesia flowsheet for intra-procedure vital signs and events.    Belongings (including cane) remain under stretcher with patient.

## 2024-08-20 NOTE — ANESTHESIA PRE PROCEDURE
Department of Anesthesiology  Preprocedure Note       Name:  Cristal Hensley   Age:  83 y.o.  :  1940                                          MRN:  [id number]         Date:  2024      Surgeon: Surgeon(s):  Andrea Lama Jr., MD    Procedure: Procedure(s):  ESOPHAGOGASTRODUODENOSCOPY WITH BIOPSY, COLONOSCOPY/BIOPSY/POLYPECTOMY  COLONOSCOPY/BIOPSY/POLYPECTOMY    Medications prior to admission:   Prior to Admission medications    Medication Sig Start Date End Date Taking? Authorizing Provider   atorvastatin (LIPITOR) 20 MG tablet 1 tablet daily 7/10/24   Ciara Roth MD   vitamin D (ERGOCALCIFEROL) 1.25 MG (25236 UT) CAPS capsule Take 1 capsule by mouth once a week 24   Ciara Roth MD   sertraline (ZOLOFT) 25 MG tablet 1 tablet daily 7/10/24   Ciara Roth MD   acetaminophen (TYLENOL) 500 MG tablet Take 1 tablet by mouth every 6 hours as needed    Automatic Reconciliation, Ar   ammonium lactate (LAC-HYDRIN) 12 % lotion rub in to affected area well  Indications: dry skin 22   Automatic Reconciliation, Ar   diclofenac sodium (VOLTAREN) 1 % GEL Apply 4 g topically every 6 hours  Patient not taking: Reported on 2024   Automatic Reconciliation, Ar   famotidine (PEPCID) 40 MG tablet Take 1 tablet by mouth daily 22   Automatic Reconciliation, Ar   furosemide (LASIX) 40 MG tablet Take 1 tablet by mouth daily as needed 22   Automatic Reconciliation, Ar   lisinopril (PRINIVIL;ZESTRIL) 10 MG tablet Take 1 tablet by mouth daily 22   Automatic Reconciliation, Ar   risperiDONE (RISPERDAL) 0.5 MG tablet Take 1 tablet by mouth 2 times daily 22   Automatic Reconciliation, Ar   sucralfate (CARAFATE) 1 GM tablet Take 1 tablet by mouth 3 times daily 22   Automatic Reconciliation, Ar   traZODone (DESYREL) 50 MG tablet Take 0.5 tablets by mouth 22   Automatic Reconciliation, Ar       Current medications:    Current Facility-Administered

## 2024-08-20 NOTE — ANESTHESIA POSTPROCEDURE EVALUATION
Department of Anesthesiology  Postprocedure Note    Patient: Cristal Hensley  MRN: [id number]  YOB: 1940  Date of evaluation: 8/20/2024    Procedure Summary       Date: 08/20/24 Room / [facility]: [facility] ENDO [facility] / [facility]    Anesthesia Start: 1407 Anesthesia Stop: 1447    Procedures:       ESOPHAGOGASTRODUODENOSCOPY WITH BIOPSY, COLONOSCOPY/BIOPSY/POLYPECTOMY (Upper GI Region)      COLONOSCOPY/BIOPSY/POLYPECTOMY (Lower GI Region) Diagnosis:       Gastroesophageal reflux disease, unspecified whether esophagitis present      Chronic diarrhea of unknown origin      (Gastroesophageal reflux disease, unspecified whether esophagitis present [K21.9])      (Chronic diarrhea of unknown origin [K52.9])    Surgeons: Andrea Lama Jr., MD Responsible Provider: Prakash Guerrero MD    Anesthesia Type: MAC, TIVA ASA Status: 2            Anesthesia Type: MAC, TIVA    Richard Phase I: Richard Score: 10    Richard Phase II: Richard Score: 10    Anesthesia Post Evaluation    Patient location during evaluation: PACU  Patient participation: complete - patient participated  Level of consciousness: awake and alert  Airway patency: patent  Nausea & Vomiting: no nausea  Cardiovascular status: hemodynamically stable  Respiratory status: acceptable  Hydration status: euvolemic    No notable events documented.

## 2024-10-22 ENCOUNTER — HOSPITAL ENCOUNTER (OUTPATIENT)
Facility: HOSPITAL | Age: 84
Discharge: HOME | End: 2024-10-25
Payer: MEDICARE

## 2024-10-22 DIAGNOSIS — R41.82 ALTERED MENTAL STATUS, UNSPECIFIED ALTERED MENTAL STATUS TYPE: ICD-10-CM

## 2024-10-22 PROCEDURE — 70450 CT HEAD/BRAIN W/O DYE: CPT

## 2025-06-08 ENCOUNTER — APPOINTMENT (OUTPATIENT)
Facility: HOSPITAL | Age: 85
DRG: 312 | End: 2025-06-08
Payer: MEDICARE

## 2025-06-08 ENCOUNTER — HOSPITAL ENCOUNTER (INPATIENT)
Facility: HOSPITAL | Age: 85
LOS: 3 days | Discharge: NURSING, REHAB OR LONG-TERM CARE | DRG: 312 | End: 2025-06-12
Attending: EMERGENCY MEDICINE | Admitting: FAMILY MEDICINE
Payer: MEDICARE

## 2025-06-08 DIAGNOSIS — D64.9 ACUTE ANEMIA: ICD-10-CM

## 2025-06-08 DIAGNOSIS — R29.6 UNWITNESSED FALL: Primary | ICD-10-CM
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
## 2025-06-08 DIAGNOSIS — I44.7 LBBB (LEFT BUNDLE BRANCH BLOCK): ICD-10-CM

## 2025-06-08 PROBLEM — R55 PRE-SYNCOPE: Status: ACTIVE | Noted: 2025-06-08

## 2025-06-08 LAB
ANION GAP SERPL CALC-SCNC: 4 MMOL/L (ref 2–12)
APPEARANCE UR: CLEAR
BACTERIA URNS QL MICRO: NEGATIVE /HPF
BASOPHILS # BLD: 0.05 K/UL (ref 0–0.1)
BASOPHILS NFR BLD: 0.6 % (ref 0–1)
BILIRUB UR QL: NEGATIVE
BUN SERPL-MCNC: 17 MG/DL (ref 6–20)
BUN/CREAT SERPL: 21 (ref 12–20)
CALCIUM SERPL-MCNC: 9.2 MG/DL (ref 8.5–10.1)
CHLORIDE SERPL-SCNC: 104 MMOL/L (ref 97–108)
CO2 SERPL-SCNC: 30 MMOL/L (ref 21–32)
COLOR UR: NORMAL
COMMENT:: NORMAL
CREAT SERPL-MCNC: 0.81 MG/DL (ref 0.55–1.02)
DIFFERENTIAL METHOD BLD: ABNORMAL
EOSINOPHIL # BLD: 0.13 K/UL (ref 0–0.4)
EOSINOPHIL NFR BLD: 1.4 % (ref 0–7)
EPITH CASTS URNS QL MICRO: NORMAL /LPF
ERYTHROCYTE [DISTWIDTH] IN BLOOD BY AUTOMATED COUNT: 15 % (ref 11.5–14.5)
FERRITIN SERPL-MCNC: 11 NG/ML (ref 8–252)
GLUCOSE BLD STRIP.AUTO-MCNC: 105 MG/DL (ref 65–117)
GLUCOSE SERPL-MCNC: 105 MG/DL (ref 65–100)
GLUCOSE UR STRIP.AUTO-MCNC: NEGATIVE MG/DL
HCT VFR BLD AUTO: 25.2 % (ref 35–47)
HEMOCCULT STL QL: NEGATIVE
HGB BLD-MCNC: 7.3 G/DL (ref 11.5–16)
HGB UR QL STRIP: NEGATIVE
HYALINE CASTS URNS QL MICRO: NORMAL /LPF (ref 0–5)
IMM GRANULOCYTES # BLD AUTO: 0.01 K/UL (ref 0–0.04)
IMM GRANULOCYTES NFR BLD AUTO: 0.1 % (ref 0–0.5)
IRON SATN MFR SERPL: 4 % (ref 20–50)
IRON SERPL-MCNC: 17 UG/DL (ref 35–150)
KETONES UR QL STRIP.AUTO: NEGATIVE MG/DL
LEUKOCYTE ESTERASE UR QL STRIP.AUTO: NEGATIVE
LYMPHOCYTES # BLD: 2.08 K/UL (ref 0.8–3.5)
LYMPHOCYTES NFR BLD: 23.2 % (ref 12–49)
MCH RBC QN AUTO: 21.4 PG (ref 26–34)
MCHC RBC AUTO-ENTMCNC: 29 G/DL (ref 30–36.5)
MCV RBC AUTO: 73.9 FL (ref 80–99)
MONOCYTES # BLD: 0.79 K/UL (ref 0–1)
MONOCYTES NFR BLD: 8.8 % (ref 5–13)
NEUTS SEG # BLD: 5.91 K/UL (ref 1.8–8)
NEUTS SEG NFR BLD: 65.9 % (ref 32–75)
NITRITE UR QL STRIP.AUTO: NEGATIVE
NRBC # BLD: 0 K/UL (ref 0–0.01)
NRBC BLD-RTO: 0 PER 100 WBC
PH UR STRIP: 7.5 (ref 5–8)
PLATELET # BLD AUTO: 283 K/UL (ref 150–400)
PMV BLD AUTO: 9.6 FL (ref 8.9–12.9)
POTASSIUM SERPL-SCNC: 3.9 MMOL/L (ref 3.5–5.1)
PROT UR STRIP-MCNC: NEGATIVE MG/DL
RBC # BLD AUTO: 3.41 M/UL (ref 3.8–5.2)
RBC #/AREA URNS HPF: NORMAL /HPF (ref 0–5)
SERVICE CMNT-IMP: NORMAL
SODIUM SERPL-SCNC: 138 MMOL/L (ref 136–145)
SP GR UR REFRACTOMETRY: 1.01 (ref 1–1.03)
SPECIMEN HOLD: NORMAL
TIBC SERPL-MCNC: 410 UG/DL (ref 250–450)
TROPONIN I SERPL HS-MCNC: 8 NG/L (ref 0–51)
URINE CULTURE IF INDICATED: NORMAL
UROBILINOGEN UR QL STRIP.AUTO: 0.2 EU/DL (ref 0.2–1)
WBC # BLD AUTO: 9 K/UL (ref 3.6–11)
WBC URNS QL MICRO: NORMAL /HPF (ref 0–4)

## 2025-06-08 PROCEDURE — 80048 BASIC METABOLIC PNL TOTAL CA: CPT

## 2025-06-08 PROCEDURE — 82728 ASSAY OF FERRITIN: CPT

## 2025-06-08 PROCEDURE — 99285 EMERGENCY DEPT VISIT HI MDM: CPT

## 2025-06-08 PROCEDURE — 73590 X-RAY EXAM OF LOWER LEG: CPT

## 2025-06-08 PROCEDURE — 81001 URINALYSIS AUTO W/SCOPE: CPT

## 2025-06-08 PROCEDURE — 82962 GLUCOSE BLOOD TEST: CPT

## 2025-06-08 PROCEDURE — 85025 COMPLETE CBC W/AUTO DIFF WBC: CPT

## 2025-06-08 PROCEDURE — 93005 ELECTROCARDIOGRAM TRACING: CPT | Performed by: EMERGENCY MEDICINE

## 2025-06-08 PROCEDURE — 83540 ASSAY OF IRON: CPT

## 2025-06-08 PROCEDURE — 6370000000 HC RX 637 (ALT 250 FOR IP): Performed by: STUDENT IN AN ORGANIZED HEALTH CARE EDUCATION/TRAINING PROGRAM

## 2025-06-08 PROCEDURE — 72125 CT NECK SPINE W/O DYE: CPT

## 2025-06-08 PROCEDURE — G0378 HOSPITAL OBSERVATION PER HR: HCPCS

## 2025-06-08 PROCEDURE — 73562 X-RAY EXAM OF KNEE 3: CPT

## 2025-06-08 PROCEDURE — 72131 CT LUMBAR SPINE W/O DYE: CPT

## 2025-06-08 PROCEDURE — 70450 CT HEAD/BRAIN W/O DYE: CPT

## 2025-06-08 PROCEDURE — 83550 IRON BINDING TEST: CPT

## 2025-06-08 PROCEDURE — 82272 OCCULT BLD FECES 1-3 TESTS: CPT

## 2025-06-08 PROCEDURE — 2500000003 HC RX 250 WO HCPCS: Performed by: STUDENT IN AN ORGANIZED HEALTH CARE EDUCATION/TRAINING PROGRAM

## 2025-06-08 PROCEDURE — 71045 X-RAY EXAM CHEST 1 VIEW: CPT

## 2025-06-08 PROCEDURE — 73521 X-RAY EXAM HIPS BI 2 VIEWS: CPT

## 2025-06-08 PROCEDURE — 84484 ASSAY OF TROPONIN QUANT: CPT

## 2025-06-08 RX ORDER — RISPERIDONE 0.5 MG/1
0.5 TABLET ORAL 2 TIMES DAILY
Status: DISCONTINUED | OUTPATIENT
Start: 2025-06-08 | End: 2025-06-12 | Stop reason: HOSPADM

## 2025-06-08 RX ORDER — SODIUM CHLORIDE 0.9 % (FLUSH) 0.9 %
5-40 SYRINGE (ML) INJECTION PRN
Status: DISCONTINUED | OUTPATIENT
Start: 2025-06-08 | End: 2025-06-12 | Stop reason: HOSPADM

## 2025-06-08 RX ORDER — ACETAMINOPHEN 325 MG/1
650 TABLET ORAL EVERY 6 HOURS PRN
Status: DISCONTINUED | OUTPATIENT
Start: 2025-06-08 | End: 2025-06-12 | Stop reason: HOSPADM

## 2025-06-08 RX ORDER — POLYETHYLENE GLYCOL 3350 17 G/17G
17 POWDER, FOR SOLUTION ORAL DAILY PRN
Status: DISCONTINUED | OUTPATIENT
Start: 2025-06-08 | End: 2025-06-12 | Stop reason: HOSPADM

## 2025-06-08 RX ORDER — PANTOPRAZOLE SODIUM 40 MG/1
40 TABLET, DELAYED RELEASE ORAL
Status: DISCONTINUED | OUTPATIENT
Start: 2025-06-09 | End: 2025-06-12 | Stop reason: HOSPADM

## 2025-06-08 RX ORDER — SODIUM CHLORIDE 9 MG/ML
INJECTION, SOLUTION INTRAVENOUS PRN
Status: DISCONTINUED | OUTPATIENT
Start: 2025-06-08 | End: 2025-06-12 | Stop reason: HOSPADM

## 2025-06-08 RX ORDER — ONDANSETRON 2 MG/ML
4 INJECTION INTRAMUSCULAR; INTRAVENOUS EVERY 6 HOURS PRN
Status: DISCONTINUED | OUTPATIENT
Start: 2025-06-08 | End: 2025-06-12 | Stop reason: HOSPADM

## 2025-06-08 RX ORDER — ONDANSETRON 4 MG/1
4 TABLET, ORALLY DISINTEGRATING ORAL EVERY 8 HOURS PRN
Status: DISCONTINUED | OUTPATIENT
Start: 2025-06-08 | End: 2025-06-12 | Stop reason: HOSPADM

## 2025-06-08 RX ORDER — ACETAMINOPHEN 650 MG/1
650 SUPPOSITORY RECTAL EVERY 6 HOURS PRN
Status: DISCONTINUED | OUTPATIENT
Start: 2025-06-08 | End: 2025-06-12 | Stop reason: HOSPADM

## 2025-06-08 RX ORDER — ATORVASTATIN CALCIUM 40 MG/1
20 TABLET, FILM COATED ORAL DAILY
Status: DISCONTINUED | OUTPATIENT
Start: 2025-06-09 | End: 2025-06-12 | Stop reason: HOSPADM

## 2025-06-08 RX ORDER — SODIUM CHLORIDE 0.9 % (FLUSH) 0.9 %
5-40 SYRINGE (ML) INJECTION EVERY 12 HOURS SCHEDULED
Status: DISCONTINUED | OUTPATIENT
Start: 2025-06-08 | End: 2025-06-12 | Stop reason: HOSPADM

## 2025-06-08 RX ADMIN — SODIUM CHLORIDE, PRESERVATIVE FREE 10 ML: 5 INJECTION INTRAVENOUS at 21:11

## 2025-06-08 RX ADMIN — RISPERIDONE 0.5 MG: 1 TABLET, FILM COATED ORAL at 21:08

## 2025-06-08 ASSESSMENT — PAIN SCALES - GENERAL
PAINLEVEL_OUTOF10: 0
PAINLEVEL_OUTOF10: 0

## 2025-06-08 ASSESSMENT — PAIN - FUNCTIONAL ASSESSMENT: PAIN_FUNCTIONAL_ASSESSMENT: 0-10

## 2025-06-08 ASSESSMENT — LIFESTYLE VARIABLES
HOW MANY STANDARD DRINKS CONTAINING ALCOHOL DO YOU HAVE ON A TYPICAL DAY: PATIENT DOES NOT DRINK
HOW OFTEN DO YOU HAVE A DRINK CONTAINING ALCOHOL: NEVER

## 2025-06-08 NOTE — ED PROVIDER NOTES
[facility]  EMERGENCY DEPARTMENT ENCOUNTER      Pt Name: Cristal Hensley  MRN: [id number]  Birthdate 1940  Date of evaluation: 6/8/2025  Provider: Srinivasan Estrada MD    CHIEF COMPLAINT       Chief Complaint   Patient presents with    Fall         HISTORY OF PRESENT ILLNESS   (Location/Symptom, Timing/Onset, Context/Setting, Quality, Duration, Modifying Factors, Severity)  Note limiting factors.   84F w/ hx HTN, HLD, GERD p/w GLF. Pt had unwitnessed fall at her nursing facility earlier today. Pt states that she slipped on the carpet while wearing socks and fell to the ground. She denies any dizziness, palpitations, syncope or LOC. Earlier was c/o b/l hip and lower leg pain. No Cp/SOB, N/V, cough. No ext weakness/numbness. NO AMS per family. No anticoagulation. Walks w/ a walker.            Review of External Medical Records:     Nursing Notes were reviewed.    REVIEW OF SYSTEMS    (2-9 systems for level 4, 10 or more for level 5)     Review of Systems   Constitutional:  Negative for diaphoresis and fever.   HENT:  Negative for nosebleeds.    Eyes:  Negative for visual disturbance.   Respiratory:  Negative for cough and shortness of breath.    Cardiovascular:  Negative for chest pain, palpitations and leg swelling.   Gastrointestinal:  Negative for abdominal distention, abdominal pain, anal bleeding, blood in stool, diarrhea, nausea and vomiting.   Endocrine: Negative for polyuria.   Genitourinary:  Negative for difficulty urinating, dysuria, frequency and hematuria.   Musculoskeletal:  Positive for arthralgias. Negative for joint swelling.   Skin:  Negative for wound.   Allergic/Immunologic: Negative for immunocompromised state.   Neurological:  Negative for seizures and syncope.   Hematological:  Does not bruise/bleed easily.   Psychiatric/Behavioral:  Negative for confusion.        Except as noted above the remainder of the review of systems was reviewed and negative.       PAST MEDICAL HISTORY      Past Medical History:   Diagnosis Date    Chronic pain     GERD (gastroesophageal reflux disease)     Hypercholesteremia     Hypertension     Ill-defined condition     DJD    Ill-defined condition     Diverticulosis         SURGICAL HISTORY       Past Surgical History:   Procedure Laterality Date    COLONOSCOPY N/A 8/20/2024    COLONOSCOPY/BIOPSY/POLYPECTOMY performed by Andrea Lama Jr., MD at [facility] ENDOSCOPY    DILATION AND CURETTAGE OF UTERUS      HEENT  2001    Oral surgery - gum disease    ORTHOPEDIC SURGERY Right 2006    Trigger finger repair    UPPER GASTROINTESTINAL ENDOSCOPY N/A 8/20/2024    ESOPHAGOGASTRODUODENOSCOPY WITH BIOPSY, COLONOSCOPY/BIOPSY/POLYPECTOMY performed by Andrea Lama Jr., MD at [facility] ENDOSCOPY    UPPER GI ENDOSCOPY,BIOPSY  9/27/2021              CURRENT MEDICATIONS       Current Discharge Medication List        CONTINUE these medications which have NOT CHANGED    Details   omeprazole (PRILOSEC) 40 MG delayed release capsule Take 1 capsule by mouth every morning (before breakfast)  Qty: 90 capsule, Refills: 1      atorvastatin (LIPITOR) 20 MG tablet 1 tablet daily      vitamin D (ERGOCALCIFEROL) 1.25 MG (05594 UT) CAPS capsule Take 1 capsule by mouth once a week      sertraline (ZOLOFT) 25 MG tablet 1 tablet daily      acetaminophen (TYLENOL) 500 MG tablet Take 1 tablet by mouth every 6 hours as needed      ammonium lactate (LAC-HYDRIN) 12 % lotion rub in to affected area well  Indications: dry skin      furosemide (LASIX) 40 MG tablet Take 1 tablet by mouth daily as needed      lisinopril (PRINIVIL;ZESTRIL) 10 MG tablet Take 1 tablet by mouth daily      risperiDONE (RISPERDAL) 0.5 MG tablet Take 1 tablet by mouth 2 times daily      sucralfate (CARAFATE) 1 GM tablet Take 1 tablet by mouth 3 times daily      traZODone (DESYREL) 50 MG tablet Take 0.5 tablets by mouth             ALLERGIES     Penicillins, Aspirin, and Cephalexin    FAMILY HISTORY       Family

## 2025-06-08 NOTE — ED TRIAGE NOTES
Patient arrives to the ED by EMS from [facility] for complaints of GLF that happened at 0500 today. Staff at facility found patient around 0800. Patient denies hitting her head, denies blood thinners.

## 2025-06-08 NOTE — H&P
History and Physical    Date of Service:  6/8/2025  Primary Care Provider: Nick Lassiter MD  Source of information: The patient, Chart review, and Spouse/family member    Chief Complaint: Fall      History of Presenting Illness:   Cristal Hensley is a 84 y.o. female who presents with fall.    This is an 84-year-old female with past medical history of chronic pain, GERD, hypercholesterolemia, degenerative disc disease, comes in for the above.  Patient said a facility.  Per EMS and family, when they went into give her medications today at her facility they found her on the ground.  The patient reports that she uses Vaseline on her legs and got some on the carpet and then slipped on that.  According to family, when EMS got to the facility her blood pressure was in the 80s/50s.  On arrival to our facility patient was found to be anemic which is a big change from the previous labs that we have (granted it was in 2022) and the patient was shown to have a left bundle branch block on EKG.  No chest pain, shortness of breath, or other signs of ACS.  Family does report that the patient has had worsening weakness lately and has a history of spinal stenosis and compression fracture.  In the ER, CT head and CT cervical spine did not show anything acute.  Patient is so far refusing x-rays of hips and knees but this will be done.    The patient denies any headache, blurry vision, sore throat, trouble swallowing, trouble with speech, chest pain, SOB, cough, fever, chills, N/V/D, abd pain, urinary symptoms, constipation, recent travels, sick contacts, focal or generalized neurological symptoms, falls, injuries, rashes, contact with COVID-19 diagnosed patients, hematemesis, melena, hemoptysis, hematuria, rashes, denies starting any new medications and denies any other concerns or problems besides as mentioned above.         REVIEW OF SYSTEMS:  A comprehensive review of systems was negative except for that written in the  Received from [facility], [facility]    Housing Stability Vital Sign     Unable to Pay for Housing in the Last Year: No     Number of Places Lived in the Last Year: 1     Unstable Housing in the Last Year: No   Interpersonal Safety: Not At Risk (6/8/2025)    Interpersonal Safety Domain Source: IP Abuse Screening     Physical abuse: Denies     Verbal abuse: Denies     Emotional abuse: Denies     Financial abuse: Denies     Sexual abuse: Denies   Utilities: Not At Risk (11/30/2023)    Received from [facility], [facility] Utilities     Threatened with loss of utilities: No        Medications were reconciled to the best of my ability given all available resources at the time of admission. Route is PO if not otherwise noted.     Family and social history were personally reviewed, all pertinent and relevant details are outlined as above.    Objective:   BP (!) 112/48   Pulse 85   Temp 97.9 °F (36.6 °C) (Oral)   Resp 15   Ht 1.651 m (5' 5\")   Wt 63.6 kg (140 lb 3.4 oz)   SpO2 99%   BMI 23.33 kg/m²         PHYSICAL EXAM:   General: Alert x oriented x 3, awake, no acute distress,   HEENT: PEERL, EOMI, moist mucus membranes  Neck: Supple, no JVD, no meningeal signs  Chest: Clear to auscultation bilaterally   CVS: RRR, S1 S2 heard, no murmurs/rubs/gallops  Abd: Soft, non-tender, non-distended, +bowel sounds   Ext: No clubbing, no cyanosis, no edema. Knees painful to palpation. Not much pain on ROM  Neuro/Psych: Pleasant mood and affect, CN 2-12 grossly intact, sensory grossly within normal limit  Cap refill: Brisk, less than 3 seconds  Pulses: 2+, symmetric in all extremities  Skin: Warm, dry, without rashes or lesions. PVD visible on BLE    Data Review:   I have independently reviewed and interpreted patient's lab and all other diagnostic data    Abnormal Labs Reviewed   BASIC METABOLIC PANEL - Abnormal; Notable for the following components:       Result Value    Glucose 105 (*)     BUN/Creatinine Ratio 21 (*)

## 2025-06-08 NOTE — ED NOTES
Bedside and Verbal shift change report given to Alvina RN (oncoming nurse) by Daphne RN (offgoing nurse). Report included the following information ED Encounter Summary, ED SBAR, MAR, and Recent Results.

## 2025-06-09 ENCOUNTER — APPOINTMENT (OUTPATIENT)
Facility: HOSPITAL | Age: 85
DRG: 312 | End: 2025-06-09
Attending: STUDENT IN AN ORGANIZED HEALTH CARE EDUCATION/TRAINING PROGRAM
Payer: MEDICARE

## 2025-06-09 PROBLEM — I44.7 LBBB (LEFT BUNDLE BRANCH BLOCK): Status: ACTIVE | Noted: 2025-06-09
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
## 2025-06-09 LAB
ANION GAP SERPL CALC-SCNC: 4 MMOL/L (ref 2–12)
BASOPHILS # BLD: 0.05 K/UL (ref 0–0.1)
BASOPHILS NFR BLD: 0.7 % (ref 0–1)
BUN SERPL-MCNC: 17 MG/DL (ref 6–20)
BUN/CREAT SERPL: 26 (ref 12–20)
CALCIUM SERPL-MCNC: 8.8 MG/DL (ref 8.5–10.1)
CHLORIDE SERPL-SCNC: 108 MMOL/L (ref 97–108)
CHOLEST SERPL-MCNC: 143 MG/DL
CO2 SERPL-SCNC: 26 MMOL/L (ref 21–32)
CREAT SERPL-MCNC: 0.65 MG/DL (ref 0.55–1.02)
DIFFERENTIAL METHOD BLD: ABNORMAL
ECHO AO ROOT DIAM: 2.6 CM
ECHO AO ROOT INDEX: 1.53 CM/M2
ECHO AV AREA PEAK VELOCITY: 1.7 CM2
ECHO AV AREA VTI: 1.8 CM2
ECHO AV AREA/BSA PEAK VELOCITY: 1 CM2/M2
ECHO AV AREA/BSA VTI: 1.1 CM2/M2
ECHO AV MEAN GRADIENT: 5 MMHG
ECHO AV MEAN VELOCITY: 1.1 M/S
ECHO AV PEAK GRADIENT: 10 MMHG
ECHO AV PEAK VELOCITY: 1.6 M/S
ECHO AV VELOCITY RATIO: 0.63
ECHO AV VTI: 26.5 CM
ECHO BSA: 1.71 M2
ECHO LA DIAMETER INDEX: 1.35 CM/M2
ECHO LA DIAMETER: 2.3 CM
ECHO LA TO AORTIC ROOT RATIO: 0.88
ECHO LV E' LATERAL VELOCITY: 5.26 CM/S
ECHO LV E' SEPTAL VELOCITY: 4.19 CM/S
ECHO LV EF PHYSICIAN: 60 %
ECHO LV FRACTIONAL SHORTENING: 8 % (ref 28–44)
ECHO LV INTERNAL DIMENSION DIASTOLE INDEX: 1.53 CM/M2
ECHO LV INTERNAL DIMENSION DIASTOLIC: 2.6 CM (ref 3.9–5.3)
ECHO LV INTERNAL DIMENSION SYSTOLIC INDEX: 1.41 CM/M2
ECHO LV INTERNAL DIMENSION SYSTOLIC: 2.4 CM
ECHO LV IVSD: 0.8 CM (ref 0.6–0.9)
ECHO LV MASS 2D: 52.1 G (ref 67–162)
ECHO LV MASS INDEX 2D: 30.7 G/M2 (ref 43–95)
ECHO LV POSTERIOR WALL DIASTOLIC: 0.9 CM (ref 0.6–0.9)
ECHO LV RELATIVE WALL THICKNESS RATIO: 0.69
ECHO LVOT AREA: 2.5 CM2
ECHO LVOT AV VTI INDEX: 0.68
ECHO LVOT DIAM: 1.8 CM
ECHO LVOT MEAN GRADIENT: 2 MMHG
ECHO LVOT PEAK GRADIENT: 4 MMHG
ECHO LVOT PEAK VELOCITY: 1 M/S
ECHO LVOT STROKE VOLUME INDEX: 26.8 ML/M2
ECHO LVOT SV: 45.5 ML
ECHO LVOT VTI: 17.9 CM
ECHO MV A VELOCITY: 1.34 M/S
ECHO MV AREA PHT: 6 CM2
ECHO MV E DECELERATION TIME (DT): 126.3 MS
ECHO MV E VELOCITY: 0.74 M/S
ECHO MV E/A RATIO: 0.55
ECHO MV E/E' LATERAL: 14.07
ECHO MV E/E' RATIO (AVERAGED): 15.86
ECHO MV E/E' SEPTAL: 17.66
ECHO MV PRESSURE HALF TIME (PHT): 36.6 MS
ECHO PULMONARY ARTERY END DIASTOLIC PRESSURE: 5 MMHG
ECHO PULMONARY ARTERY END DIASTOLIC PRESSURE: 9 MMHG
ECHO PV REGURGITANT MAX VELOCITY: 1.1 M/S
ECHO TV REGURGITANT MAX VELOCITY: 2.51 M/S
ECHO TV REGURGITANT PEAK GRADIENT: 25 MMHG
EOSINOPHIL # BLD: 0.18 K/UL (ref 0–0.4)
EOSINOPHIL NFR BLD: 2.6 % (ref 0–7)
ERYTHROCYTE [DISTWIDTH] IN BLOOD BY AUTOMATED COUNT: 14.9 % (ref 11.5–14.5)
GLUCOSE SERPL-MCNC: 101 MG/DL (ref 65–100)
HCT VFR BLD AUTO: 24.4 % (ref 35–47)
HCT VFR BLD AUTO: 24.7 % (ref 35–47)
HDLC SERPL-MCNC: 68 MG/DL
HDLC SERPL: 2.1 (ref 0–5)
HGB BLD-MCNC: 7 G/DL (ref 11.5–16)
HGB BLD-MCNC: 7.2 G/DL (ref 11.5–16)
IMM GRANULOCYTES # BLD AUTO: 0.02 K/UL (ref 0–0.04)
IMM GRANULOCYTES NFR BLD AUTO: 0.3 % (ref 0–0.5)
LDLC SERPL CALC-MCNC: 63 MG/DL (ref 0–100)
LYMPHOCYTES # BLD: 1.73 K/UL (ref 0.8–3.5)
LYMPHOCYTES NFR BLD: 25.1 % (ref 12–49)
MCH RBC QN AUTO: 21.7 PG (ref 26–34)
MCHC RBC AUTO-ENTMCNC: 28.3 G/DL (ref 30–36.5)
MCV RBC AUTO: 76.5 FL (ref 80–99)
MONOCYTES # BLD: 0.61 K/UL (ref 0–1)
MONOCYTES NFR BLD: 8.9 % (ref 5–13)
NEUTS SEG # BLD: 4.31 K/UL (ref 1.8–8)
NEUTS SEG NFR BLD: 62.4 % (ref 32–75)
NRBC # BLD: 0.03 K/UL (ref 0–0.01)
NRBC BLD-RTO: 0.4 PER 100 WBC
PLATELET # BLD AUTO: 294 K/UL (ref 150–400)
PMV BLD AUTO: 10.4 FL (ref 8.9–12.9)
POTASSIUM SERPL-SCNC: 3.7 MMOL/L (ref 3.5–5.1)
RBC # BLD AUTO: 3.23 M/UL (ref 3.8–5.2)
RBC MORPH BLD: ABNORMAL
SODIUM SERPL-SCNC: 138 MMOL/L (ref 136–145)
TRIGL SERPL-MCNC: 60 MG/DL
VLDLC SERPL CALC-MCNC: 12 MG/DL
WBC # BLD AUTO: 6.9 K/UL (ref 3.6–11)

## 2025-06-09 PROCEDURE — 97165 OT EVAL LOW COMPLEX 30 MIN: CPT

## 2025-06-09 PROCEDURE — 85018 HEMOGLOBIN: CPT

## 2025-06-09 PROCEDURE — 6370000000 HC RX 637 (ALT 250 FOR IP)

## 2025-06-09 PROCEDURE — G0378 HOSPITAL OBSERVATION PER HR: HCPCS

## 2025-06-09 PROCEDURE — 99222 1ST HOSP IP/OBS MODERATE 55: CPT | Performed by: INTERNAL MEDICINE

## 2025-06-09 PROCEDURE — 85025 COMPLETE CBC W/AUTO DIFF WBC: CPT

## 2025-06-09 PROCEDURE — 85014 HEMATOCRIT: CPT

## 2025-06-09 PROCEDURE — 93306 TTE W/DOPPLER COMPLETE: CPT | Performed by: INTERNAL MEDICINE

## 2025-06-09 PROCEDURE — 93306 TTE W/DOPPLER COMPLETE: CPT

## 2025-06-09 PROCEDURE — 97530 THERAPEUTIC ACTIVITIES: CPT

## 2025-06-09 PROCEDURE — 80048 BASIC METABOLIC PNL TOTAL CA: CPT

## 2025-06-09 PROCEDURE — 2500000003 HC RX 250 WO HCPCS: Performed by: STUDENT IN AN ORGANIZED HEALTH CARE EDUCATION/TRAINING PROGRAM

## 2025-06-09 PROCEDURE — 97161 PT EVAL LOW COMPLEX 20 MIN: CPT

## 2025-06-09 PROCEDURE — 6370000000 HC RX 637 (ALT 250 FOR IP): Performed by: STUDENT IN AN ORGANIZED HEALTH CARE EDUCATION/TRAINING PROGRAM

## 2025-06-09 PROCEDURE — 93005 ELECTROCARDIOGRAM TRACING: CPT | Performed by: NURSE PRACTITIONER

## 2025-06-09 PROCEDURE — 1100000000 HC RM PRIVATE

## 2025-06-09 PROCEDURE — 80061 LIPID PANEL: CPT

## 2025-06-09 RX ADMIN — SERTRALINE HYDROCHLORIDE 25 MG: 50 TABLET ORAL at 09:07

## 2025-06-09 RX ADMIN — SODIUM CHLORIDE, PRESERVATIVE FREE 10 ML: 5 INJECTION INTRAVENOUS at 20:09

## 2025-06-09 RX ADMIN — Medication 3 MG: at 21:37

## 2025-06-09 RX ADMIN — PANTOPRAZOLE SODIUM 40 MG: 40 TABLET, DELAYED RELEASE ORAL at 09:07

## 2025-06-09 RX ADMIN — ACETAMINOPHEN 650 MG: 325 TABLET ORAL at 15:51

## 2025-06-09 RX ADMIN — ATORVASTATIN CALCIUM 20 MG: 40 TABLET, FILM COATED ORAL at 09:07

## 2025-06-09 RX ADMIN — RISPERIDONE 0.5 MG: 1 TABLET, FILM COATED ORAL at 09:07

## 2025-06-09 RX ADMIN — SODIUM CHLORIDE, PRESERVATIVE FREE 10 ML: 5 INJECTION INTRAVENOUS at 09:08

## 2025-06-09 RX ADMIN — DICLOFENAC SODIUM 2 G: 10 GEL TOPICAL at 20:09

## 2025-06-09 RX ADMIN — RISPERIDONE 0.5 MG: 1 TABLET, FILM COATED ORAL at 20:08

## 2025-06-09 ASSESSMENT — PAIN DESCRIPTION - DESCRIPTORS: DESCRIPTORS: PRESSURE

## 2025-06-09 ASSESSMENT — PAIN SCALES - GENERAL: PAINLEVEL_OUTOF10: 5

## 2025-06-09 ASSESSMENT — PAIN DESCRIPTION - LOCATION: LOCATION: KNEE

## 2025-06-09 ASSESSMENT — PAIN DESCRIPTION - ORIENTATION: ORIENTATION: RIGHT

## 2025-06-09 NOTE — PLAN OF CARE
Problem: Skin/Tissue Integrity  Goal: Skin integrity remains intact  Description: 1.  Monitor for areas of redness and/or skin breakdown2.  Assess vascular access sites hourly3.  Every 4-6 hours minimum:  Change oxygen saturation probe site4.  Every 4-6 hours:  If on nasal continuous positive airway pressure, respiratory therapy assess nares and determine need for appliance change or resting period  6/9/2025 0738 by Radha Hutchison LPN  Outcome: Progressing     Problem: Safety - Adult  Goal: Free from fall injury  6/9/2025 1201 by Hollie Singh RN  Outcome: Progressing  6/9/2025 0738 by Radha Hutchison LPN  Outcome: Progressing     Problem: Chronic Conditions and Co-morbidities  Goal: Patient's chronic conditions and co-morbidity symptoms are monitored and maintained or improved  6/9/2025 0738 by Radha Hutchison LPN  Outcome: Progressing     Problem: Pain  Goal: Verbalizes/displays adequate comfort level or baseline comfort level  6/9/2025 1201 by Hollie Singh RN  Outcome: Progressing  6/9/2025 0738 by Radha Hutchison LPN  Outcome: Progressing     Problem: Discharge Planning  Goal: Discharge to home or other facility with appropriate resources  6/9/2025 1201 by Hollie Singh RN  Outcome: Progressing  6/9/2025 0738 by Radha Hutchison LPN  Outcome: Progressing     Problem: Occupational Therapy - Adult  Goal: By Discharge: Performs self-care activities at highest level of function for planned discharge setting.  See evaluation for individualized goals.  Description: FUNCTIONAL STATUS PRIOR TO ADMISSION:  pt resides alone in her intermediate apartment at [facility]. She ambulates with a rollator and reports mod I for toileting and dressing. She has an aide who comes twice a week to assist with showering.   , Prior Level of Assist for ADLs: Independent,  ,  ,  ,  ,  , Prior Level of Assist for Homemaking: Needs assistance, Ambulation Assistance: Independent, Prior Level of Assist for Transfers: Independent,       HOME  SUPPORT: lives alone, has local supportive son and dtr    Occupational Therapy Goals:  Initiated 6/9/2025  1.  Patient will perform standing ADLs at sink in bathroom with Supervision within 7 day(s).  2.  Patient will perform lower body dressing with Minimal Assist within 7 day(s).  3.  Patient will perform bathing with Minimal Assist within 7 day(s).  4.  Patient will perform toilet transfers using RW with Supervision  within 7 day(s).  5.  Patient will perform all aspects of toileting with Supervision within 7 day(s).  6/9/2025 1146 by Ada De Jesus, OT  Outcome: Progressing

## 2025-06-09 NOTE — PLAN OF CARE
Rollator, Cane  Prior Level of Assist for ADLs: Independent  Prior Level of Assist for Homemaking: Needs assistance  Prior Level of Assist for Transfers: Independent      Hand Dominance: right     EXAMINATION OF PERFORMANCE DEFICITS:    Cognitive/Behavioral Status:  Orientation  Orientation Level: Oriented to situation;Oriented to person;Oriented to time;Disoriented to place       Skin: venous stasis to B shins    Edema: none    Hearing:        Vision/Perceptual:    Vision - Basic Assessment  Prior Vision: No visual deficits           Vision  Vision: Within Functional Limits         Range of Motion:   AROM: Generally decreased, functional         Strength:  Strength: Generally decreased, functional      Coordination:  Coordination: Within functional limits            Tone & Sensation:      Sensation: Intact          Functional Mobility and Transfers for ADLs:    Bed Mobility:     Bed Mobility Training  Bed Mobility Training: Yes  Supine to Sit: Contact guard assistance  Scooting: Contact guard assistance    Transfers:      Transfer Training  Transfer Training: Yes  Sit to Stand: Minimal assistance  Stand to Sit: Minimal assistance  Bed to Chair: Minimal assistance                     Balance:      Balance  Sitting: Intact  Standing: Impaired;With support  Standing - Static: Good  Standing - Dynamic: Fair      ADL Assessment:          Feeding: Independent       Grooming: Setup       UE Bathing: Minimal assistance       Product Used : Bath wipes    LE Bathing: Minimal assistance       UE Dressing: Minimal assistance       LE Dressing: Minimal assistance       Toileting: Minimal assistance                         ADL Intervention and task modifications:                [facility] AM-PACTM \"6 Clicks\"                                                       Daily Activity Inpatient Short Form  AM-PAC Daily Activity - Inpatient   How much help is needed for putting on and taking off regular lower body clothing?: A  Little  How much help is needed for bathing (which includes washing, rinsing, drying)?: A Little  How much help is needed for toileting (which includes using toilet, bedpan, or urinal)?: A Little  How much help is needed for putting on and taking off regular upper body clothing?: A Little  How much help is needed for taking care of personal grooming?: None  How much help for eating meals?: None  AM-[facility] Inpatient Daily Activity Raw Score: 20  AM-PAC Inpatient ADL T-Scale Score : 42.03  ADL Inpatient CMS 0-100% Score: 38.32  ADL Inpatient CMS G-Code Modifier : CJ     Interpretation of Tool:  Represents clinically-significant functional categories (i.e. Activities of daily living).  Cutoff score 39.4 (19) correlates to a good likelihood of discharging home versus a facility  Janna Vega, Beulah Martini, Mazin Porter, Monisha Beasley, Jorgito Mccarty, Esau Vega, -PAC “6-Clicks” Functional Assessment Scores Predict Acute Care Hospital Discharge Destination, Physical Therapy, Volume 94, Issue 9, 2014, Pages 1752-4915, [email or web address]       Pain Ratin/10   Pain Intervention(s):       Activity Tolerance:   Good    Patient Vitals for the past 6 hrs:   Pulse BP Patient Position   25 1200 73 -- --   25 1123 92 108/64 High fowlers   25 1117 98 95/63 Sitting   25 1114 (!) 103 (!) 98/41 Standing   25 1110 95 (!) 91/53 Sitting   25 1107 96 (!) 108/54 Semi fowlers   25 1000 100 -- --        After treatment:   Patient left in no apparent distress sitting up in chair, Call bell within reach, Bed/ chair alarm activated, and Caregiver / family present    COMMUNICATION/EDUCATION:   The patient's plan of care was discussed with: physical therapist and registered nurse         Thank you for this referral.  Ada De Jesus OT  Minutes: 26    Occupational Therapy Evaluation Charge Determination   History Examination Decision-Making   LOW

## 2025-06-09 NOTE — PLAN OF CARE
Problem: Skin/Tissue Integrity  Goal: Skin integrity remains intact  Description: 1.  Monitor for areas of redness and/or skin breakdown2.  Assess vascular access sites hourly3.  Every 4-6 hours minimum:  Change oxygen saturation probe site4.  Every 4-6 hours:  If on nasal continuous positive airway pressure, respiratory therapy assess nares and determine need for appliance change or resting period  Outcome: Progressing     Problem: Safety - Adult  Goal: Free from fall injury  Outcome: Progressing     Problem: Chronic Conditions and Co-morbidities  Goal: Patient's chronic conditions and co-morbidity symptoms are monitored and maintained or improved  Outcome: Progressing     Problem: Pain  Goal: Verbalizes/displays adequate comfort level or baseline comfort level  Outcome: Progressing     Problem: Discharge Planning  Goal: Discharge to home or other facility with appropriate resources  Outcome: Progressing

## 2025-06-09 NOTE — ED NOTES
Attending NP notified of pt's lower trending BP readings. Was instructed to monitor pt and notify them if pt becomes symptomatic. Pt care ongoing.

## 2025-06-09 NOTE — CARE COORDINATION
Care Management Initial Assessment       RUR: Observation   Readmission? No  1st IM letter given? No    CÉSAR: SNF - Referrals pending with [facility] and [facility] (3 night stay needed, MERISSA 6/12)    Admitted from [facility]  Address: [address], [place] [address]  Phone: [phone]  Transport: Dtr    ROSY met with patient at bedside to introduce self and role. Pt is a poor historian, CM reached out to Pt's dtr Vijaya to complete initial assessment. Pt lives at [facility] since April 2022.     CM spoke with [facility] Admin - Reese Rodriguez - they prefer Pt return day of DC by 3pm. No bedside assessment needed. Clinicals to be faxed to [phone].    ADLs: independent, ambulates with rollator at baseline   DME: cane, rollator  PCP follow up: Dr. Lassiter   Previous Home Health: [facility] is preferred by [facility]  Previous Skilled Nursing Facility: none  Previous Inpatient Rehab: none  Insurance verified: Yes Medicare A&B and [employer]   Pharmacy: [facility]   Emergency Contact: Dtr Vijaya Rea [phone]    CM will follow patient progress and assist as needed with CÉSAR plan.    2:30pm: PT/OT and Hospitalist recommending SNF at discharge. CM spoke with Pt and her dtr Vijaya at bedside, SNF list provided. Prefer [facility] or [facility]. Referral sent. Pt family plans to review SNF list tonight and follow up for any additional choices.      06/09/25 1104   Service Assessment   Patient Orientation Alert and Oriented;Self;Person   Cognition Dementia / Early Alzheimer's   History Provided By Child/Family   Support Systems Children;Family Members   Patient's Healthcare Decision Maker is: Legal Next of Kin   PCP Verified by CM Yes   Last Visit to PCP Within last 6 months   Prior Functional Level Assistance with the following:;Housework;Cooking;Shopping   Current Functional Level Shopping;Housework;Cooking;Assistance with the following:   Can patient

## 2025-06-09 NOTE — CONSULTS
[facility] CARDIOLOGY  Cardiology Care Note                  [x]Initial visit     []Established visit     Patient Name: Cristal Hensley - :1940 - MRN:[id number]  Primary Cardiologist: Jerry Sellers MD  Consulting Cardiologist: Agustin Abbott MD     Reason for initial visit: possible syncope w/ new LBBB    HPI:   Ms. Hensley is a 84 y.o. female with PMH of HTN, HLD, negative stress echo in 2017, GERD, chronic pain, Degenerative disc disease.   Pt presented to ER after being found on ground by nursing staff. EMS reported that her BP was 80's/50's.    She reports that she had slipped and fallen. She denies any loss of consciousness.  She denies any pre-event dizziness/lightheadedness.   She remembers falling.  Denies any history of chest pain or SOB.  I Reports she has occasional lightheadedness .  In ER, she was found to have Hgb of 7.3, HS troponin is 8.  CXR showed mild left basilar atelectasis.  EKG showed NSR with LBBB (LBBB not seen on prior EKGs) Of note, her BP med is lisinopril 10 mg daily PTA.   SH: never smoker, no ETOH.  Father had heart problem in in his 60's.   Cardiology is consulted for concern for possible syncope with new LBBB.    SUBJECTIVE:  Denies any chest pain.  Stool OB negative.  Hgb today is 7. Iron and iron sat low. She denies any history of black stools or blood in stool.         Assessment and Plan     LBBB:  new finding.  Not noted on prior EKGs  and . Check echo. No chest pain reported.     S/p fall:  while she admits to some hx of memory disorder, She denies any syncope/loss of consciousness with event although her BP was low, She is adamant she slipped and fell.  Nonetheless, will check echocardiogram.  Telemetry shows pauses or significant arrhythmias.     History of HTN: hypotension PTA. Episodes of mild hypotension here.  Remain off lisinopril. Suspect notable anemia contributed to hypotension

## 2025-06-09 NOTE — WOUND CARE
Wound care    Consulted for \"BLE vascular discoloration\"  Admitted for pre-syncope    Spoke with RN.    No open wounds just hemosiderin staining.  Thin skin  Can cover arms and legs with socks or wrap for protection.    and Guzman Protocol:  Turn/reposition approximately every 2 hours  Offload heels with heels hanging off end of pillow at all times while in bed.  Sacral Preventive dressing: change as needed ~every 4 days. Discontinue if incontinence is frequently soiling dressing.     Address incontinence/Maintain continence    Will sign off.  Miri MICHELLEN RN

## 2025-06-09 NOTE — PROGRESS NOTES
[facility]                                                                                          Hospitalist Progress Note  Gaby Pablo PA-C  Answering service: [phone] OR [phone] from in house phone        Date of Service:  2025  NAME:  Cristal Hensley  :  1940  MRN:  [id number]       Admission Summary:     Cristal Hensley is a 84 y.o. female who presents with fall.     This is an 84-year-old female with past medical history of chronic pain, GERD, hypercholesterolemia, degenerative disc disease, comes in for the above.  Patient said a facility.  Per EMS and family, when they went into give her medications today at her facility they found her on the ground.  The patient reports that she uses Vaseline on her legs and got some on the carpet and then slipped on that.  According to family, when EMS got to the facility her blood pressure was in the 80s/50s.  On arrival to our facility patient was found to be anemic which is a big change from the previous labs that we have (granted it was in ) and the patient was shown to have a left bundle branch block on EKG.  No chest pain, shortness of breath, or other signs of ACS.  Family does report that the patient has had worsening weakness lately and has a history of spinal stenosis and compression fracture.  In the ER, CT head and CT cervical spine did not show anything acute.  Patient is so far refusing x-rays of hips and knees but this will be done.     The patient denies any headache, blurry vision, sore throat, trouble swallowing, trouble with speech, chest pain, SOB, cough, fever, chills, N/V/D, abd pain, urinary symptoms, constipation, recent travels, sick contacts, focal or generalized neurological symptoms, falls, injuries, rashes, contact with COVID-19 diagnosed patients, hematemesis, melena, hemoptysis, hematuria, rashes, denies starting any new medications and denies any other concerns or problems  found for: \"GLUCPOC\"  [unfilled]    Notes reviewed from all clinical/nonclinical/nursing services involved in patient's clinical care. Care coordination discussions were held with appropriate clinical/nonclinical/ nursing providers based on care coordination needs.         Patients current active Medications were reviewed, considered, added and adjusted based on the clinical condition today.      Home Medications were reconciled to the best of my ability given all available resources at the time of admission. Route is PO if not otherwise noted.      Admission Status:[id number]:::1}      Medications Reviewed:     Current Facility-Administered Medications   Medication Dose Route Frequency    sodium chloride flush 0.9 % injection 5-40 mL  5-40 mL IntraVENous 2 times per day    sodium chloride flush 0.9 % injection 5-40 mL  5-40 mL IntraVENous PRN    0.9 % sodium chloride infusion   IntraVENous PRN    ondansetron (ZOFRAN-ODT) disintegrating tablet 4 mg  4 mg Oral Q8H PRN    Or    ondansetron (ZOFRAN) injection 4 mg  4 mg IntraVENous Q6H PRN    polyethylene glycol (GLYCOLAX) packet 17 g  17 g Oral Daily PRN    acetaminophen (TYLENOL) tablet 650 mg  650 mg Oral Q6H PRN    Or    acetaminophen (TYLENOL) suppository 650 mg  650 mg Rectal Q6H PRN    atorvastatin (LIPITOR) tablet 20 mg  20 mg Oral Daily    pantoprazole (PROTONIX) tablet 40 mg  40 mg Oral QAM AC    risperiDONE (RISPERDAL) tablet 0.5 mg  0.5 mg Oral BID    sertraline (ZOLOFT) tablet 25 mg  25 mg Oral Daily    melatonin tablet 3 mg  3 mg Oral Nightly PRN     ______________________________________________________________________  EXPECTED LENGTH OF STAY: Unable to retrieve estimated LOS  ACTUAL LENGTH OF STAY:          0                 Gaby Pablo PA-C

## 2025-06-09 NOTE — PROGRESS NOTES
A Spiritual Care Partner Volunteer visited Cristal SULEIMAN Hensley at [facility] in [facility] 6S NEURO-SCI TELE on 6/9/2025     Documented by: Chaplain Jaja Soares

## 2025-06-09 NOTE — ED NOTES
Pt placed on bed alarm. Explained the use of the purewick to pt multiple times and instructed her to use the call bell if she needs any assistance. Pt verbalized understanding.

## 2025-06-09 NOTE — PLAN OF CARE
Problem: Physical Therapy - Adult  Goal: By Discharge: Performs mobility at highest level of function for planned discharge setting.  See evaluation for individualized goals.  Description: FUNCTIONAL STATUS PRIOR TO ADMISSION: Patient ambulated with a rollator at her [facility]IE.    HOME SUPPORT PRIOR TO ADMISSION: Patient lived alone in her [facility] apartment, has an aide 2x/ wk for showers, receives meals in her room, supportive son and dtr local.    Physical Therapy Goals  Initiated 6/9/2025  1.  Patient will move from supine to sit and sit to supine in bed with supervision/set-up within 7 day(s).    2.  Patient will perform sit to stand with supervision/set-up within 7 day(s).  3.  Patient will transfer from bed to chair and chair to bed with supervision/set-up using the least restrictive device within 7 day(s).  4.  Patient will ambulate with supervision/set-up for 50 feet with the least restrictive device within 7 day(s).           Outcome: Progressing     PHYSICAL THERAPY EVALUATION    Patient: Cristal Hensley (84 y.o. female)  Date: 6/9/2025  Primary Diagnosis: LBBB (left bundle branch block) [I44.7]  Pre-syncope [R55]  Unwitnessed fall [R29.6]  Acute anemia [D64.9]  Ground-level fall [W18.30XA]       Precautions: Restrictions/Precautions  Restrictions/Precautions: Fall Risk, Bed Alarm            ASSESSMENT :   DEFICITS/IMPAIRMENTS:   The patient is limited by decreased functional mobility, independence in ADLs, strength, activity tolerance, safety awareness, cognition, balance and increased pain levels.     Based on the impairments listed above the patient presents below her functional baseline and at risk for falls. She is overall min A mobilizing, able to stand w/ RW and ambulate a few steps from the bed to the chair w/ assist.  She did have a slight drop in BP, voiced no symptoms of OH.  Her only complaint was amarjit knee pain (new since her fall, imaging neg) and feeling tired.  Pt was left up in the chair w/ her son

## 2025-06-10 LAB
ANION GAP SERPL CALC-SCNC: 7 MMOL/L (ref 2–12)
BUN SERPL-MCNC: 20 MG/DL (ref 6–20)
BUN/CREAT SERPL: 22 (ref 12–20)
CALCIUM SERPL-MCNC: 8.7 MG/DL (ref 8.5–10.1)
CHLORIDE SERPL-SCNC: 107 MMOL/L (ref 97–108)
CO2 SERPL-SCNC: 24 MMOL/L (ref 21–32)
CREAT SERPL-MCNC: 0.91 MG/DL (ref 0.55–1.02)
EKG ATRIAL RATE: 78 BPM
EKG ATRIAL RATE: 83 BPM
EKG DIAGNOSIS: NORMAL
EKG DIAGNOSIS: NORMAL
EKG P AXIS: 38 DEGREES
EKG P AXIS: 42 DEGREES
EKG P-R INTERVAL: 162 MS
EKG P-R INTERVAL: 166 MS
EKG Q-T INTERVAL: 430 MS
EKG Q-T INTERVAL: 442 MS
EKG QRS DURATION: 134 MS
EKG QRS DURATION: 136 MS
EKG QTC CALCULATION (BAZETT): 503 MS
EKG QTC CALCULATION (BAZETT): 505 MS
EKG R AXIS: -30 DEGREES
EKG R AXIS: -8 DEGREES
EKG T AXIS: 57 DEGREES
EKG T AXIS: 78 DEGREES
EKG VENTRICULAR RATE: 78 BPM
EKG VENTRICULAR RATE: 83 BPM
ERYTHROCYTE [DISTWIDTH] IN BLOOD BY AUTOMATED COUNT: 15.1 % (ref 11.5–14.5)
GLUCOSE SERPL-MCNC: 134 MG/DL (ref 65–100)
HCT VFR BLD AUTO: 27.7 % (ref 35–47)
HGB BLD-MCNC: 7.9 G/DL (ref 11.5–16)
MCH RBC QN AUTO: 22.1 PG (ref 26–34)
MCHC RBC AUTO-ENTMCNC: 28.5 G/DL (ref 30–36.5)
MCV RBC AUTO: 77.4 FL (ref 80–99)
NRBC # BLD: 0 K/UL (ref 0–0.01)
NRBC BLD-RTO: 0 PER 100 WBC
PLATELET # BLD AUTO: 303 K/UL (ref 150–400)
PMV BLD AUTO: 10 FL (ref 8.9–12.9)
POTASSIUM SERPL-SCNC: 3.8 MMOL/L (ref 3.5–5.1)
RBC # BLD AUTO: 3.58 M/UL (ref 3.8–5.2)
SODIUM SERPL-SCNC: 138 MMOL/L (ref 136–145)
WBC # BLD AUTO: 8.4 K/UL (ref 3.6–11)

## 2025-06-10 PROCEDURE — 97530 THERAPEUTIC ACTIVITIES: CPT

## 2025-06-10 PROCEDURE — 93010 ELECTROCARDIOGRAM REPORT: CPT | Performed by: INTERNAL MEDICINE

## 2025-06-10 PROCEDURE — 97116 GAIT TRAINING THERAPY: CPT

## 2025-06-10 PROCEDURE — 80048 BASIC METABOLIC PNL TOTAL CA: CPT

## 2025-06-10 PROCEDURE — 2500000003 HC RX 250 WO HCPCS: Performed by: STUDENT IN AN ORGANIZED HEALTH CARE EDUCATION/TRAINING PROGRAM

## 2025-06-10 PROCEDURE — 1100000000 HC RM PRIVATE

## 2025-06-10 PROCEDURE — 6370000000 HC RX 637 (ALT 250 FOR IP): Performed by: STUDENT IN AN ORGANIZED HEALTH CARE EDUCATION/TRAINING PROGRAM

## 2025-06-10 PROCEDURE — 6370000000 HC RX 637 (ALT 250 FOR IP): Performed by: NURSE PRACTITIONER

## 2025-06-10 PROCEDURE — 97535 SELF CARE MNGMENT TRAINING: CPT

## 2025-06-10 PROCEDURE — 85027 COMPLETE CBC AUTOMATED: CPT

## 2025-06-10 RX ORDER — TRAMADOL HYDROCHLORIDE 50 MG/1
50 TABLET ORAL EVERY 6 HOURS PRN
Status: DISCONTINUED | OUTPATIENT
Start: 2025-06-10 | End: 2025-06-12 | Stop reason: HOSPADM

## 2025-06-10 RX ORDER — FERROUS SULFATE 325(65) MG
325 TABLET ORAL 2 TIMES DAILY WITH MEALS
Status: DISCONTINUED | OUTPATIENT
Start: 2025-06-10 | End: 2025-06-12 | Stop reason: HOSPADM

## 2025-06-10 RX ADMIN — FERROUS SULFATE TAB 325 MG (65 MG ELEMENTAL FE) 325 MG: 325 (65 FE) TAB at 16:41

## 2025-06-10 RX ADMIN — ATORVASTATIN CALCIUM 20 MG: 40 TABLET, FILM COATED ORAL at 08:59

## 2025-06-10 RX ADMIN — DICLOFENAC SODIUM 2 G: 10 GEL TOPICAL at 09:01

## 2025-06-10 RX ADMIN — DICLOFENAC SODIUM 2 G: 10 GEL TOPICAL at 22:01

## 2025-06-10 RX ADMIN — PANTOPRAZOLE SODIUM 40 MG: 40 TABLET, DELAYED RELEASE ORAL at 06:11

## 2025-06-10 RX ADMIN — RISPERIDONE 0.5 MG: 1 TABLET, FILM COATED ORAL at 08:58

## 2025-06-10 RX ADMIN — Medication 3 MG: at 22:01

## 2025-06-10 RX ADMIN — SODIUM CHLORIDE, PRESERVATIVE FREE 10 ML: 5 INJECTION INTRAVENOUS at 22:01

## 2025-06-10 RX ADMIN — RISPERIDONE 0.5 MG: 1 TABLET, FILM COATED ORAL at 22:01

## 2025-06-10 RX ADMIN — TRAMADOL HYDROCHLORIDE 50 MG: 50 TABLET, COATED ORAL at 11:06

## 2025-06-10 RX ADMIN — SODIUM CHLORIDE, PRESERVATIVE FREE 10 ML: 5 INJECTION INTRAVENOUS at 09:00

## 2025-06-10 RX ADMIN — SERTRALINE HYDROCHLORIDE 25 MG: 50 TABLET ORAL at 08:58

## 2025-06-10 ASSESSMENT — PAIN DESCRIPTION - DESCRIPTORS: DESCRIPTORS: ACHING

## 2025-06-10 ASSESSMENT — PAIN SCALES - GENERAL: PAINLEVEL_OUTOF10: 7

## 2025-06-10 ASSESSMENT — PAIN - FUNCTIONAL ASSESSMENT: PAIN_FUNCTIONAL_ASSESSMENT: PREVENTS OR INTERFERES SOME ACTIVE ACTIVITIES AND ADLS

## 2025-06-10 NOTE — PROGRESS NOTES
[facility]                                                                                          Hospitalist Progress Note  Felicia Higginbotham, CLAUDY Quinones CNP  Answering service: [phone] OR [phone] from in house phone        Date of Service:  6/10/2025  NAME:  Cristal Hensley  :  1940  MRN:  [id number]       Admission Summary:     Cristal Hensley is a 84 y.o. female who presents with fall.     This is an 84-year-old female with past medical history of chronic pain, GERD, hypercholesterolemia, degenerative disc disease, comes in for the above.  Patient said a facility.  Per EMS and family, when they went into give her medications today at her facility they found her on the ground.  The patient reports that she uses Vaseline on her legs and got some on the carpet and then slipped on that.  According to family, when EMS got to the facility her blood pressure was in the 80s/50s.  On arrival to our facility patient was found to be anemic which is a big change from the previous labs that we have (granted it was in ) and the patient was shown to have a left bundle branch block on EKG.  No chest pain, shortness of breath, or other signs of ACS.  Family does report that the patient has had worsening weakness lately and has a history of spinal stenosis and compression fracture.  In the ER, CT head and CT cervical spine did not show anything acute.  Patient is so far refusing x-rays of hips and knees but this will be done.     The patient denies any headache, blurry vision, sore throat, trouble swallowing, trouble with speech, chest pain, SOB, cough, fever, chills, N/V/D, abd pain, urinary symptoms, constipation, recent travels, sick contacts, focal or generalized neurological symptoms, falls, injuries, rashes, contact with COVID-19 diagnosed patients, hematemesis, melena, hemoptysis, hematuria, rashes, denies starting any new medications and denies any other concerns or  ondansetron (ZOFRAN) injection 4 mg  4 mg IntraVENous Q6H PRN    polyethylene glycol (GLYCOLAX) packet 17 g  17 g Oral Daily PRN    acetaminophen (TYLENOL) tablet 650 mg  650 mg Oral Q6H PRN    Or    acetaminophen (TYLENOL) suppository 650 mg  650 mg Rectal Q6H PRN    atorvastatin (LIPITOR) tablet 20 mg  20 mg Oral Daily    pantoprazole (PROTONIX) tablet 40 mg  40 mg Oral QAM AC    risperiDONE (RISPERDAL) tablet 0.5 mg  0.5 mg Oral BID    sertraline (ZOLOFT) tablet 25 mg  25 mg Oral Daily    melatonin tablet 3 mg  3 mg Oral Nightly PRN     ______________________________________________________________________  EXPECTED LENGTH OF STAY: Unable to retrieve estimated LOS  ACTUAL LENGTH OF STAY:          1                 Felicia Higginbotham, APRN - CNP

## 2025-06-10 NOTE — PLAN OF CARE
Problem: Physical Therapy - Adult  Goal: By Discharge: Performs mobility at highest level of function for planned discharge setting.  See evaluation for individualized goals.  Description: FUNCTIONAL STATUS PRIOR TO ADMISSION: Patient ambulated with a rollator at her JACKIE.    HOME SUPPORT PRIOR TO ADMISSION: Patient lived alone in her [facility] apartment, has an aide 2x/ wk for showers, receives meals in her room, supportive son and dtr local.    Physical Therapy Goals  Initiated 6/9/2025  1.  Patient will move from supine to sit and sit to supine in bed with supervision/set-up within 7 day(s).    2.  Patient will perform sit to stand with supervision/set-up within 7 day(s).  3.  Patient will transfer from bed to chair and chair to bed with supervision/set-up using the least restrictive device within 7 day(s).  4.  Patient will ambulate with supervision/set-up for 50 feet with the least restrictive device within 7 day(s).     Outcome: Progressing   PHYSICAL THERAPY TREATMENT    Patient: Cristal Hensley (84 y.o. female)  Date: 6/10/2025  Diagnosis: LBBB (left bundle branch block) [I44.7]  Pre-syncope [R55]  Unwitnessed fall [R29.6]  Acute anemia [D64.9]  Ground-level fall [W18.30XA] Pre-syncope      Precautions: Restrictions/Precautions  Restrictions/Precautions: Fall Risk, Bed Alarm            ASSESSMENT:  Patient continues to benefit from skilled PT services and is slowly progressing towards goals however remains most limited by generalized weakness, impaired balance, impaired gait, and decr tolerance to activity leading to increased falls risk and dependency from baseline level.    Received pt up in chair, cleared for mobility. Orthostatics assessed and were negative. She was able pt to participate in increased mobility this date - including multiple sit<>stands from recliner and low toilet seat with min A and cues for hand placement sequencing. Gait training progressed to/from bathroom with RW and min A; demos very fwd

## 2025-06-10 NOTE — PLAN OF CARE
Problem: Occupational Therapy - Adult  Goal: By Discharge: Performs self-care activities at highest level of function for planned discharge setting.  See evaluation for individualized goals.  Description: FUNCTIONAL STATUS PRIOR TO ADMISSION:  pt resides alone in her [place] apartment at [facility]. She ambulates with a rollator and reports mod I for toileting and dressing. She has an aide who comes twice a week to assist with showering.    Prior Level of Assist for ADLs: Independent, Prior Level of Assist for Homemaking: Needs assistance, Ambulation Assistance: Independent, Prior Level of Assist for Transfers: Independent,       HOME SUPPORT: lives alone, has local supportive son and dtr    Occupational Therapy Goals:  Initiated 6/9/2025  1.  Patient will perform standing ADLs at sink in bathroom with Supervision within 7 day(s).  2.  Patient will perform lower body dressing with Minimal Assist within 7 day(s).  3.  Patient will perform bathing with Minimal Assist within 7 day(s).  4.  Patient will perform toilet transfers using RW with Supervision  within 7 day(s).  5.  Patient will perform all aspects of toileting with Supervision within 7 day(s).  Outcome: Progressing   OCCUPATIONAL THERAPY TREATMENT  Patient: Cristal Hensley (84 y.o. female)  Date: 6/10/2025  Primary Diagnosis: LBBB (left bundle branch block) [I44.7]  Pre-syncope [R55]  Unwitnessed fall [R29.6]  Acute anemia [D64.9]  Ground-level fall [W18.30XA]       Precautions: Fall Risk, Bed Alarm  Chart, occupational therapy assessment, plan of care, and goals were reviewed.    ASSESSMENT  Patient continues to benefit from skilled OT services and is slowly progressing towards goals. Patient received OOB in recliner chair, BP taken with positional changes this date and significantly improved from day prior. Patient transferred to standing with RW and ambulatory with slow gait to bathroom. Transferred on and off commode and completed andres hygiene in  understanding;Demonstrated understanding;Continued education needed    Thank you for this referral.  Rosa Feng, OTR/L  Minutes: 28

## 2025-06-10 NOTE — PLAN OF CARE
Problem: Skin/Tissue Integrity  Goal: Skin integrity remains intact  Description: 1.  Monitor for areas of redness and/or skin breakdown2.  Assess vascular access sites hourly3.  Every 4-6 hours minimum:  Change oxygen saturation probe site4.  Every 4-6 hours:  If on nasal continuous positive airway pressure, respiratory therapy assess nares and determine need for appliance change or resting period  Outcome: Progressing  Flowsheets  Taken 6/10/2025 1259  Skin Integrity Remains Intact: Monitor for areas of redness and/or skin breakdown  Taken 6/10/2025 0800  Skin Integrity Remains Intact: Monitor for areas of redness and/or skin breakdown     Problem: Safety - Adult  Goal: Free from fall injury  Outcome: Progressing  Flowsheets (Taken 6/10/2025 1259)  Free From Fall Injury: Instruct family/caregiver on patient safety     Problem: Chronic Conditions and Co-morbidities  Goal: Patient's chronic conditions and co-morbidity symptoms are monitored and maintained or improved  Outcome: Progressing  Flowsheets (Taken 6/10/2025 0800)  Care Plan - Patient's Chronic Conditions and Co-Morbidity Symptoms are Monitored and Maintained or Improved: Monitor and assess patient's chronic conditions and comorbid symptoms for stability, deterioration, or improvement     Problem: Pain  Goal: Verbalizes/displays adequate comfort level or baseline comfort level  Outcome: Progressing     Problem: Discharge Planning  Goal: Discharge to home or other facility with appropriate resources  Outcome: Progressing  Flowsheets (Taken 6/10/2025 0800)  Discharge to home or other facility with appropriate resources: Arrange for needed discharge resources and transportation as appropriate

## 2025-06-10 NOTE — PLAN OF CARE
Problem: Skin/Tissue Integrity  Goal: Skin integrity remains intact  Description: 1.  Monitor for areas of redness and/or skin breakdown2.  Assess vascular access sites hourly3.  Every 4-6 hours minimum:  Change oxygen saturation probe site4.  Every 4-6 hours:  If on nasal continuous positive airway pressure, respiratory therapy assess nares and determine need for appliance change or resting period  Outcome: Progressing  Flowsheets (Taken 6/9/2025 2000)  Skin Integrity Remains Intact: Monitor for areas of redness and/or skin breakdown     Problem: Safety - Adult  Goal: Free from fall injury  6/9/2025 2250 by Elizabeth Amaral, RN  Outcome: Progressing  Flowsheets (Taken 6/9/2025 2000)  Free From Fall Injury: Instruct family/caregiver on patient safety  6/9/2025 1201 by Hollie Singh RN  Outcome: Progressing  Flowsheets (Taken 6/9/2025 0800)  Free From Fall Injury: Instruct family/caregiver on patient safety     Problem: Chronic Conditions and Co-morbidities  Goal: Patient's chronic conditions and co-morbidity symptoms are monitored and maintained or improved  Outcome: Progressing     Problem: Pain  Goal: Verbalizes/displays adequate comfort level or baseline comfort level  6/9/2025 2250 by Elizabeth Amaral RN  Outcome: Progressing  6/9/2025 1201 by Hollie Singh RN  Outcome: Progressing  Flowsheets (Taken 6/9/2025 0800)  Verbalizes/displays adequate comfort level or baseline comfort level:   Administer analgesics based on type and severity of pain and evaluate response   Notify Licensed Independent Practitioner if interventions unsuccessful or patient reports new pain   Encourage patient to monitor pain and request assistance   Assess pain using appropriate pain scale   Consider cultural and social influences on pain and pain management   Implement non-pharmacological measures as appropriate and evaluate response     Problem: Discharge Planning  Goal: Discharge to home or other facility with appropriate

## 2025-06-11 PROCEDURE — 1100000000 HC RM PRIVATE

## 2025-06-11 PROCEDURE — 6360000002 HC RX W HCPCS: Performed by: STUDENT IN AN ORGANIZED HEALTH CARE EDUCATION/TRAINING PROGRAM

## 2025-06-11 PROCEDURE — 2500000003 HC RX 250 WO HCPCS: Performed by: STUDENT IN AN ORGANIZED HEALTH CARE EDUCATION/TRAINING PROGRAM

## 2025-06-11 PROCEDURE — 6370000000 HC RX 637 (ALT 250 FOR IP): Performed by: STUDENT IN AN ORGANIZED HEALTH CARE EDUCATION/TRAINING PROGRAM

## 2025-06-11 PROCEDURE — 6370000000 HC RX 637 (ALT 250 FOR IP): Performed by: NURSE PRACTITIONER

## 2025-06-11 PROCEDURE — 97535 SELF CARE MNGMENT TRAINING: CPT

## 2025-06-11 RX ADMIN — POLYETHYLENE GLYCOL 3350 17 G: 17 POWDER, FOR SOLUTION ORAL at 09:11

## 2025-06-11 RX ADMIN — ONDANSETRON 4 MG: 2 INJECTION, SOLUTION INTRAMUSCULAR; INTRAVENOUS at 09:11

## 2025-06-11 RX ADMIN — DICLOFENAC SODIUM 2 G: 10 GEL TOPICAL at 22:01

## 2025-06-11 RX ADMIN — TRAMADOL HYDROCHLORIDE 50 MG: 50 TABLET, COATED ORAL at 21:56

## 2025-06-11 RX ADMIN — PANTOPRAZOLE SODIUM 40 MG: 40 TABLET, DELAYED RELEASE ORAL at 07:00

## 2025-06-11 RX ADMIN — RISPERIDONE 0.5 MG: 1 TABLET, FILM COATED ORAL at 21:57

## 2025-06-11 RX ADMIN — TRAMADOL HYDROCHLORIDE 50 MG: 50 TABLET, COATED ORAL at 15:00

## 2025-06-11 RX ADMIN — DICLOFENAC SODIUM 2 G: 10 GEL TOPICAL at 08:51

## 2025-06-11 RX ADMIN — Medication 3 MG: at 21:57

## 2025-06-11 RX ADMIN — SERTRALINE HYDROCHLORIDE 25 MG: 50 TABLET ORAL at 08:50

## 2025-06-11 RX ADMIN — FERROUS SULFATE TAB 325 MG (65 MG ELEMENTAL FE) 325 MG: 325 (65 FE) TAB at 17:39

## 2025-06-11 RX ADMIN — SODIUM CHLORIDE, PRESERVATIVE FREE 10 ML: 5 INJECTION INTRAVENOUS at 08:52

## 2025-06-11 RX ADMIN — FERROUS SULFATE TAB 325 MG (65 MG ELEMENTAL FE) 325 MG: 325 (65 FE) TAB at 08:50

## 2025-06-11 RX ADMIN — SODIUM CHLORIDE, PRESERVATIVE FREE 10 ML: 5 INJECTION INTRAVENOUS at 21:57

## 2025-06-11 RX ADMIN — ATORVASTATIN CALCIUM 20 MG: 40 TABLET, FILM COATED ORAL at 08:51

## 2025-06-11 RX ADMIN — RISPERIDONE 0.5 MG: 1 TABLET, FILM COATED ORAL at 08:51

## 2025-06-11 ASSESSMENT — PAIN SCALES - GENERAL
PAINLEVEL_OUTOF10: 6
PAINLEVEL_OUTOF10: 7

## 2025-06-11 ASSESSMENT — PAIN DESCRIPTION - LOCATION
LOCATION: HIP;BACK
LOCATION: KNEE

## 2025-06-11 NOTE — PROGRESS NOTES
[facility]  Hospitalist [facility]                                                                                          Hospitalist Progress Note  Fabiana Luna PA-C  Answering service: [phone] OR [phone] from in house phone        Date of Service:  2025  NAME:  Cristal Hensley  :  1940  MRN:  [id number]       Admission Summary:   Cristal Hensley is a 84 y.o. female who presented to the ED on 2025 with fall. This is an 84-year-old female with past medical history of chronic pain, GERD, hypercholesterolemia, and degenerative disc disease, who came in for the above.  Patient stays at a facility. Per EMS and family, when they went into give her medications on day of ED arrival at her facility they found her on the ground.  The patient reported that she used Vaseline on her legs and got some on the carpet and then slipped on that. According to family, when EMS got to the facility her blood pressure was in the 80s/50s.  On arrival to [facility] the patient was found to be anemic, large change from the previous labs that we have (in ) and the patient was shown to have a left bundle branch block on EKG.  No chest pain, shortness of breath, or other signs of ACS.  Family reported that the patient has had worsening weakness lately and has a history of spinal stenosis and compression fracture.  In the ER, CT head and CT cervical spine did not show anything acute.  Patient was refusing x-rays of hips and knees.    Interval history / Subjective:   Patient was seen and examined on rounds this morning. AAO x 4. States she is uncomfortable today. Discussed at length how uncomfortable the beds are. Discussed discharge with the patient, who is in agreement to go back to facility tomorrow. Denies CP, SOB, abdominal pain, fevers or chills. No further complaints at this time.    Medically stable. Discharge tomorrow. Patient needed 3 midnight stay for placement.  [facility] has

## 2025-06-11 NOTE — PLAN OF CARE
Problem: Skin/Tissue Integrity  Goal: Skin integrity remains intact  Description: 1.  Monitor for areas of redness and/or skin breakdown2.  Assess vascular access sites hourly3.  Every 4-6 hours minimum:  Change oxygen saturation probe site4.  Every 4-6 hours:  If on nasal continuous positive airway pressure, respiratory therapy assess nares and determine need for appliance change or resting period  6/10/2025 2315 by Elizabeth Amaral RN  Outcome: Progressing  Flowsheets (Taken 6/10/2025 2000)  Skin Integrity Remains Intact: Monitor for areas of redness and/or skin breakdown  6/10/2025 1301 by Venita Grewal RN  Outcome: Progressing  Flowsheets  Taken 6/10/2025 1259  Skin Integrity Remains Intact: Monitor for areas of redness and/or skin breakdown  Taken 6/10/2025 0800  Skin Integrity Remains Intact: Monitor for areas of redness and/or skin breakdown     Problem: Safety - Adult  Goal: Free from fall injury  6/10/2025 2315 by Elizabeth Amaral RN  Outcome: Progressing  Flowsheets (Taken 6/10/2025 2000)  Free From Fall Injury: Instruct family/caregiver on patient safety  6/10/2025 1301 by Venita Grewal RN  Outcome: Progressing  Flowsheets (Taken 6/10/2025 1259)  Free From Fall Injury: Instruct family/caregiver on patient safety     Problem: Chronic Conditions and Co-morbidities  Goal: Patient's chronic conditions and co-morbidity symptoms are monitored and maintained or improved  6/10/2025 2315 by Elizabeth Amaral RN  Outcome: Progressing  Flowsheets (Taken 6/10/2025 2000)  Care Plan - Patient's Chronic Conditions and Co-Morbidity Symptoms are Monitored and Maintained or Improved: Monitor and assess patient's chronic conditions and comorbid symptoms for stability, deterioration, or improvement  6/10/2025 1301 by Venita Grewal, RN  Outcome: Progressing  Flowsheets (Taken 6/10/2025 0800)  Care Plan - Patient's Chronic Conditions and Co-Morbidity Symptoms are Monitored and Maintained or Improved: Monitor

## 2025-06-11 NOTE — PLAN OF CARE
Problem: Skin/Tissue Integrity  Goal: Skin integrity remains intact  Description: 1.  Monitor for areas of redness and/or skin breakdown2.  Assess vascular access sites hourly3.  Every 4-6 hours minimum:  Change oxygen saturation probe site4.  Every 4-6 hours:  If on nasal continuous positive airway pressure, respiratory therapy assess nares and determine need for appliance change or resting period  6/11/2025 1019 by Tracy Dixon, RN  Outcome: Progressing  Flowsheets (Taken 6/11/2025 0800)  Skin Integrity Remains Intact: Monitor for areas of redness and/or skin breakdown  6/10/2025 2315 by Elizabeth Amaral RN  Outcome: Progressing  Flowsheets (Taken 6/10/2025 2000)  Skin Integrity Remains Intact: Monitor for areas of redness and/or skin breakdown     Problem: Safety - Adult  Goal: Free from fall injury  6/11/2025 1019 by Tracy Dixon RN  Outcome: Progressing  6/10/2025 2315 by Elizabeth Amaral RN  Outcome: Progressing  Flowsheets (Taken 6/10/2025 2000)  Free From Fall Injury: Instruct family/caregiver on patient safety     Problem: Chronic Conditions and Co-morbidities  Goal: Patient's chronic conditions and co-morbidity symptoms are monitored and maintained or improved  6/11/2025 1019 by Tracy Dixon RN  Outcome: Progressing  Flowsheets (Taken 6/11/2025 0800)  Care Plan - Patient's Chronic Conditions and Co-Morbidity Symptoms are Monitored and Maintained or Improved:   Monitor and assess patient's chronic conditions and comorbid symptoms for stability, deterioration, or improvement   Collaborate with multidisciplinary team to address chronic and comorbid conditions and prevent exacerbation or deterioration   Update acute care plan with appropriate goals if chronic or comorbid symptoms are exacerbated and prevent overall improvement and discharge  6/10/2025 2315 by Elizabeth Amaral RN  Outcome: Progressing  Flowsheets (Taken 6/10/2025 2000)  Care Plan - Patient's Chronic

## 2025-06-11 NOTE — CARE COORDINATION
Transition of Care Plan:    SNF - [facility] - 6/12 (after 3 night stay)    Transport: BLS preferred    RUR: 14%  Prior Level of Functioning: independent, ambulates with RW at baseline  Disposition: SNF  MERISSA: 6/12  If SNF or IPR: Date FOC offered: 6/9   Date FOC received: 6/9   Accepting facility: [facility]   Follow up appointments: pcp   DME needed: defer to snf   Transportation at discharge: BLS  IM/IMM Medicare/ letter given:   Caregiver Contact: Dtr Vijaya Rea [phone]   Discharge Caregiver contacted prior to discharge?   Care Conference needed? No   Barriers to discharge: 3 night IP stay for Medicare    [facility] has accepted Pt for SNF. [facility] does not have a bed available for Pt.     CM spoke with patient's dtr Vijaya. They prefer BLS transport at discharge. Pt's Dtr verbalized understanding that insurance will be billed for trip, however CM is not able to guarantee 100% coverage for trip.     SULEIMAN Dinh (Ally).S.CECILIA.

## 2025-06-11 NOTE — PLAN OF CARE
Problem: Occupational Therapy - Adult  Goal: By Discharge: Performs self-care activities at highest level of function for planned discharge setting.  See evaluation for individualized goals.  Description: FUNCTIONAL STATUS PRIOR TO ADMISSION:  pt resides alone in her [place] apartment at [facility]. She ambulates with a rollator and reports mod I for toileting and dressing. She has an aide who comes twice a week to assist with showering.    Prior Level of Assist for ADLs: Independent, Prior Level of Assist for Homemaking: Needs assistance, Ambulation Assistance: Independent, Prior Level of Assist for Transfers: Independent,       HOME SUPPORT: lives alone, has local supportive son and dtr    Occupational Therapy Goals:  Initiated 6/9/2025  1.  Patient will perform standing ADLs at sink in bathroom with Supervision within 7 day(s).  2.  Patient will perform lower body dressing with Minimal Assist within 7 day(s).  3.  Patient will perform bathing with Minimal Assist within 7 day(s).  4.  Patient will perform toilet transfers using RW with Supervision  within 7 day(s).  5.  Patient will perform all aspects of toileting with Supervision within 7 day(s).  Outcome: Progressing   OCCUPATIONAL THERAPY TREATMENT  Patient: Cristal Hensley (84 y.o. female)  Date: 6/11/2025  Primary Diagnosis: LBBB (left bundle branch block) [I44.7]  Pre-syncope [R55]  Unwitnessed fall [R29.6]  Acute anemia [D64.9]  Ground-level fall [W18.30XA]       Precautions: Fall Risk, Bed Alarm  Chart, occupational therapy assessment, plan of care, and goals were reviewed.    ASSESSMENT  Patient continues to benefit from skilled OT services and is slowly progressing towards goals. Patient received OOB in recliner chair and agreeable to working with therapy. Patient stood with RW, remains with forward flexed posture which daughter reports is baseline. Patient ambulatory to and from bathroom and voided on commode, completed andres hygiene in standing.

## 2025-06-12 VITALS
HEIGHT: 65 IN | SYSTOLIC BLOOD PRESSURE: 117 MMHG | DIASTOLIC BLOOD PRESSURE: 62 MMHG | OXYGEN SATURATION: 98 % | WEIGHT: 142.86 LBS | BODY MASS INDEX: 23.8 KG/M2 | TEMPERATURE: 97.3 F | HEART RATE: 92 BPM | RESPIRATION RATE: 15 BRPM

## 2025-06-12 LAB
HCT VFR BLD AUTO: 24.5 % (ref 35–47)
HGB BLD-MCNC: 7 G/DL (ref 11.5–16)

## 2025-06-12 PROCEDURE — 85018 HEMOGLOBIN: CPT

## 2025-06-12 PROCEDURE — 6370000000 HC RX 637 (ALT 250 FOR IP): Performed by: STUDENT IN AN ORGANIZED HEALTH CARE EDUCATION/TRAINING PROGRAM

## 2025-06-12 PROCEDURE — 85014 HEMATOCRIT: CPT

## 2025-06-12 PROCEDURE — 2500000003 HC RX 250 WO HCPCS: Performed by: STUDENT IN AN ORGANIZED HEALTH CARE EDUCATION/TRAINING PROGRAM

## 2025-06-12 PROCEDURE — 6370000000 HC RX 637 (ALT 250 FOR IP): Performed by: NURSE PRACTITIONER

## 2025-06-12 RX ORDER — FERROUS SULFATE 325(65) MG
325 TABLET ORAL 2 TIMES DAILY WITH MEALS
Qty: 30 TABLET | Refills: 3 | Status: SHIPPED | OUTPATIENT
Start: 2025-06-12

## 2025-06-12 RX ORDER — ERGOCALCIFEROL 1.25 MG/1
50000 CAPSULE, LIQUID FILLED ORAL WEEKLY
Status: DISCONTINUED | OUTPATIENT
Start: 2025-06-12 | End: 2025-06-12 | Stop reason: HOSPADM

## 2025-06-12 RX ORDER — TRAMADOL HYDROCHLORIDE 50 MG/1
50 TABLET ORAL EVERY 6 HOURS PRN
Qty: 10 TABLET | Refills: 0 | Status: SHIPPED
Start: 2025-06-12 | End: 2025-06-15

## 2025-06-12 RX ADMIN — SODIUM CHLORIDE, PRESERVATIVE FREE 10 ML: 5 INJECTION INTRAVENOUS at 08:08

## 2025-06-12 RX ADMIN — ATORVASTATIN CALCIUM 20 MG: 40 TABLET, FILM COATED ORAL at 08:07

## 2025-06-12 RX ADMIN — RISPERIDONE 0.5 MG: 1 TABLET, FILM COATED ORAL at 08:08

## 2025-06-12 RX ADMIN — FERROUS SULFATE TAB 325 MG (65 MG ELEMENTAL FE) 325 MG: 325 (65 FE) TAB at 08:08

## 2025-06-12 RX ADMIN — ERGOCALCIFEROL 50000 UNITS: 1.25 CAPSULE ORAL at 15:15

## 2025-06-12 RX ADMIN — PANTOPRAZOLE SODIUM 40 MG: 40 TABLET, DELAYED RELEASE ORAL at 06:23

## 2025-06-12 RX ADMIN — DICLOFENAC SODIUM 2 G: 10 GEL TOPICAL at 08:09

## 2025-06-12 RX ADMIN — SERTRALINE HYDROCHLORIDE 25 MG: 50 TABLET ORAL at 08:07

## 2025-06-12 RX ADMIN — ACETAMINOPHEN 650 MG: 325 TABLET ORAL at 15:48

## 2025-06-12 NOTE — PLAN OF CARE
Problem: Skin/Tissue Integrity  Goal: Skin integrity remains intact  Description: 1.  Monitor for areas of redness and/or skin breakdown2.  Assess vascular access sites hourly3.  Every 4-6 hours minimum:  Change oxygen saturation probe site4.  Every 4-6 hours:  If on nasal continuous positive airway pressure, respiratory therapy assess nares and determine need for appliance change or resting period  6/12/2025 0824 by Chanda Greene RN  Outcome: Progressing  Flowsheets (Taken 6/12/2025 0800)  Skin Integrity Remains Intact:   Monitor for areas of redness and/or skin breakdown   Assess vascular access sites hourly   Every 4-6 hours minimum:  Change oxygen saturation probe site   Every 4-6 hours:  If on nasal continuous positive airway pressure, assess nares and determine need for appliance change or resting period  6/11/2025 2049 by Elizabeth Amaral RN  Outcome: Progressing  Flowsheets (Taken 6/11/2025 2000)  Skin Integrity Remains Intact: Monitor for areas of redness and/or skin breakdown     Problem: Safety - Adult  Goal: Free from fall injury  6/12/2025 0824 by Chanda Greene RN  Outcome: Progressing  6/11/2025 2049 by Elizabeth Amaral RN  Outcome: Progressing  Flowsheets (Taken 6/11/2025 2000)  Free From Fall Injury: Instruct family/caregiver on patient safety     Problem: Chronic Conditions and Co-morbidities  Goal: Patient's chronic conditions and co-morbidity symptoms are monitored and maintained or improved  6/12/2025 0824 by Chanda Greene RN  Outcome: Progressing  Flowsheets (Taken 6/12/2025 0800)  Care Plan - Patient's Chronic Conditions and Co-Morbidity Symptoms are Monitored and Maintained or Improved:   Monitor and assess patient's chronic conditions and comorbid symptoms for stability, deterioration, or improvement   Collaborate with multidisciplinary team to address chronic and comorbid conditions and prevent exacerbation or deterioration   Update acute care plan with appropriate goals if

## 2025-06-12 NOTE — PLAN OF CARE
Problem: Skin/Tissue Integrity  Goal: Skin integrity remains intact  Description: 1.  Monitor for areas of redness and/or skin breakdown2.  Assess vascular access sites hourly3.  Every 4-6 hours minimum:  Change oxygen saturation probe site4.  Every 4-6 hours:  If on nasal continuous positive airway pressure, respiratory therapy assess nares and determine need for appliance change or resting period  6/12/2025 1520 by Chanda Greene RN  Outcome: Adequate for Discharge  6/12/2025 0824 by Chanda Greene RN  Outcome: Progressing  Flowsheets (Taken 6/12/2025 0800)  Skin Integrity Remains Intact:   Monitor for areas of redness and/or skin breakdown   Assess vascular access sites hourly   Every 4-6 hours minimum:  Change oxygen saturation probe site   Every 4-6 hours:  If on nasal continuous positive airway pressure, assess nares and determine need for appliance change or resting period     Problem: Safety - Adult  Goal: Free from fall injury  6/12/2025 1520 by Chanda Greene RN  Outcome: Adequate for Discharge  6/12/2025 0824 by Chanda Greene RN  Outcome: Progressing  Flowsheets (Taken 6/12/2025 0800)  Free From Fall Injury:   Instruct family/caregiver on patient safety   Based on caregiver fall risk screen, instruct family/caregiver to ask for assistance with transferring infant if caregiver noted to have fall risk factors     Problem: Chronic Conditions and Co-morbidities  Goal: Patient's chronic conditions and co-morbidity symptoms are monitored and maintained or improved  6/12/2025 1520 by Chanda Greene RN  Outcome: Adequate for Discharge  6/12/2025 0824 by Chanda Greene RN  Outcome: Progressing  Flowsheets (Taken 6/12/2025 0800)  Care Plan - Patient's Chronic Conditions and Co-Morbidity Symptoms are Monitored and Maintained or Improved:   Monitor and assess patient's chronic conditions and comorbid symptoms for stability, deterioration, or improvement   Collaborate with multidisciplinary team to address chronic  and comorbid conditions and prevent exacerbation or deterioration   Update acute care plan with appropriate goals if chronic or comorbid symptoms are exacerbated and prevent overall improvement and discharge     Problem: Pain  Goal: Verbalizes/displays adequate comfort level or baseline comfort level  6/12/2025 1520 by Chanda Greene RN  Outcome: Adequate for Discharge  6/12/2025 0824 by Chanda Greene RN  Outcome: Progressing     Problem: Discharge Planning  Goal: Discharge to home or other facility with appropriate resources  6/12/2025 1520 by Chanda Greene RN  Outcome: Adequate for Discharge  6/12/2025 0824 by Chanda Greene RN  Outcome: Progressing  Flowsheets (Taken 6/12/2025 0800)  Discharge to home or other facility with appropriate resources:   Identify barriers to discharge with patient and caregiver   Arrange for needed discharge resources and transportation as appropriate   Identify discharge learning needs (meds, wound care, etc)   Arrange for interpreters to assist at discharge as needed   Refer to discharge planning if patient needs post-hospital services based on physician order or complex needs related to functional status, cognitive ability or social support system     Problem: ABCDS Injury Assessment  Goal: Absence of physical injury  Outcome: Adequate for Discharge  Flowsheets (Taken 6/12/2025 0800)  Absence of Physical Injury: Implement safety measures based on patient assessment

## 2025-06-12 NOTE — DISCHARGE SUMMARY
Discharge Summary   Please note that this dictation was completed with [employer], the computer voice recognition software.  Quite often unanticipated grammatical, syntax, homophones, and other interpretive errors are inadvertently transcribed by the computer software.  Please disregard these errors.  Please excuse any errors that have escaped final proofreading.    PATIENT ID: Cristal Hensley  MRN: [id number]   YOB: 1940    DATE OF ADMISSION: 6/8/2025 11:11 AM    DATE OF DISCHARGE: 6/12/2025  PRIMARY CARE PROVIDER: Nick Lassiter MD         ATTENDING PHYSICIAN: Bruce Monahan MD  DISCHARGING PROVIDER: Bruce Monahan MD       CONSULTATIONS: IP CONSULT TO CARDIOLOGY    PROCEDURES/SURGERIES: * No surgery found *    ADMITTING HPI from excerpted H&P             HOSPITAL COURSE & DISCHARGE DIAGNOSIS/ PLAN:     Mechanical fall in setting of dementia  Compression fracture L1  Spinal canal stenosis more severe L5-S1  CT head 6/8/2025 no acute process CT cervical spine multilevel degenerative disease no acute osseous abnormality.  CT lumbar spine acute on chronic compression fracture at L1.  Multilevel spinal canal stenosis most severe L3-L4 L4-L5.  Most severe L5-S1.    - Pain management  - PT OT    History of left bundle branch block  History of hypertension  TTE 6/19/2025 no acute process  -Assessed by cardiology appreciate help  - Lisinopril and Lasix on hold due to soft blood pressures  - Follow-up with PCP further management    Iron deficiency anemia  - Continue iron supplementation    History of dementia/mood disorder  History of GERD  Continue regimen      PENDING TEST RESULTS:   At the time of discharge the following test results are still pending: None    FOLLOW UP APPOINTMENTS:    [unfilled]     ADDITIONAL CARE RECOMMENDATIONS: Follow-up with PCP    DIET: regular diet    ACTIVITY: activity as tolerated    WOUND CARE: TBD    EQUIPMENT needed: TBD the facility      DISCHARGE MEDICATIONS:    fever, chills, nausea, vomiting, diarrhea, change in mentation, falling, weakness, bleeding. Severe pain or pain not relieved by medications.  Or, any other signs or symptoms that you may have questions about.    DISPOSITION:    Home With:   OT  PT  HH  RN      x Long term SNF/Inpatient Rehab    Independent/assisted living    Hospice    Other:       PATIENT CONDITION AT DISCHARGE:     Functional status   x Poor     Deconditioned     Independent      Cognition     Lucid     Forgetful    x Dementia      Catheters/lines (plus indication)    Garcia     PICC     PEG    x None      Code status    x Full code     DNR      PHYSICAL EXAMINATION AT DISCHARGE:    General : alert x 1, awake, no acute distress,   HEENT: PEERL, EOMI, moist mucus membrane, TM clear  Neck: supple, no JVD, no meningeal signs  Chest: Clear to auscultation bilaterally   CVS: S1 S2 heard, Capillary refill less than 2 seconds  Abd: soft/ Non tender, non distended, BS physiological,   Ext: no clubbing, no cyanosis, no edema, brisk 2+ DP pulses  Neuro/Psych: Confused, CN 2-12 grossly intact, sensory grossly within normal limit, Strength 5/5 in all extremities, DTR 1+ x 4  Skin: warm     CHRONIC MEDICAL DIAGNOSES:      Greater than 31 minutes were spent with the patient on counseling and coordination of care    Signed:   Bruce Monahan MD  6/12/2025  3:25 PM

## 2025-06-12 NOTE — CARE COORDINATION
Transition of Care Plan to SNF/Rehab    Communication to Patient/Family:  Met with patient and family and they are agreeable to the transition plan. The Plan for Transition of Care is related to the following treatment goals: SNF    The Patient and/or patient representative was provided with a choice of provider and agrees  with the discharge plan.      Yes [x] No []    A Freedom of choice list was provided with basic dialogue that supports the patient's individualized plan of care/goals and shares the quality data associated with the providers.       Yes [x] No []    SNF/Rehab Transition:  Patient has been accepted to [facility] SNF/Rehab and meets criteria for admission.   Date of Inpatient Status Order:   Patient will transported by [facility] and expected to leave at 4pm.    Communication to SNF/Rehab:  Bedside RN, Chanda, has been notified to update the transition plan to the facility and call report ([phone]; [address] [place] [address]).  Discharge information has been updated on the AVS. And communicated to facility via [email or web address], or CC link.     Discharge instructions [facility] fax'd [facility] facility at ([facility]ax #). [facility]    Nursing Please include all hard scripts for controlled substances, med rec and dc summary, and AVS in packet.     Reviewed and confirmed with facility, [facility], can manage the patient care needs for the following:     Ubaldo with (X) only those applicable:  Medication:  [x]Medications are available at the facility  []IV Antibiotics    []Controlled Substance - hard copies available sent.  []Weekly Labs    Equipment:  []CPAP/BiPAP  []Wound Vacuum  []Garcia or Urinary Device  []PICC/Central Line  []Nebulizer  []Ventilator    Treatment:  []Isolation (for MRSA, VRE, etc.)  []Surgical Drain Management  []Tracheostomy Care  []Dressing Changes  []Dialysis with transportation  []PEG Care  []Oxygen  []Daily Weights for Heart Failure    Dietary:  []Any diet

## 2025-06-12 NOTE — PLAN OF CARE
Problem: Skin/Tissue Integrity  Goal: Skin integrity remains intact  Description: 1.  Monitor for areas of redness and/or skin breakdown2.  Assess vascular access sites hourly3.  Every 4-6 hours minimum:  Change oxygen saturation probe site4.  Every 4-6 hours:  If on nasal continuous positive airway pressure, respiratory therapy assess nares and determine need for appliance change or resting period  6/11/2025 2049 by Elizabeth Amaral RN  Outcome: Progressing  Flowsheets (Taken 6/11/2025 2000)  Skin Integrity Remains Intact: Monitor for areas of redness and/or skin breakdown  6/11/2025 1019 by Tracy Dixon RN  Outcome: Progressing  Flowsheets (Taken 6/11/2025 0800)  Skin Integrity Remains Intact: Monitor for areas of redness and/or skin breakdown     Problem: Safety - Adult  Goal: Free from fall injury  6/11/2025 2049 by Elizabeth Amaral RN  Outcome: Progressing  Flowsheets (Taken 6/11/2025 2000)  Free From Fall Injury: Instruct family/caregiver on patient safety  6/11/2025 1019 by Tracy Dixon RN  Outcome: Progressing     Problem: Chronic Conditions and Co-morbidities  Goal: Patient's chronic conditions and co-morbidity symptoms are monitored and maintained or improved  6/11/2025 2049 by Elizabeth Amaral RN  Outcome: Progressing  Flowsheets (Taken 6/11/2025 2000)  Care Plan - Patient's Chronic Conditions and Co-Morbidity Symptoms are Monitored and Maintained or Improved: Monitor and assess patient's chronic conditions and comorbid symptoms for stability, deterioration, or improvement  6/11/2025 1019 by Tracy Dixon RN  Outcome: Progressing  Flowsheets (Taken 6/11/2025 0800)  Care Plan - Patient's Chronic Conditions and Co-Morbidity Symptoms are Monitored and Maintained or Improved:   Monitor and assess patient's chronic conditions and comorbid symptoms for stability, deterioration, or improvement   Collaborate with multidisciplinary team to address chronic and comorbid

## 2025-06-16 NOTE — PROGRESS NOTES
Physician Progress Note      PATIENT:               NICHOLE JACOBO  CSN #:                  [id number]  :                       1940  ADMIT DATE:       2025 11:11 AM  DISCH DATE:        2025 4:23 PM  RESPONDING  PROVIDER #:        Bruce Monahan MD          QUERY TEXT:    Syncope is documented in the medical record per PNs.  Please clarify the cause   such as:    The clinical indicators include:  Patient clinical indicators include:    -Syncope  -per DS - Mechanical fall in setting of dementia  - BP was 80's/50's per EMS  -DION-hgb 7.9  Options provided:  -- Syncope related to Hypotension  -- Syncope related to other cause, Please specify and document other cause  -- Other - I will add my own diagnosis  -- Disagree - Not applicable / Not valid  -- Disagree - Clinically unable to determine / Unknown  -- Refer to Clinical Documentation Reviewer    PROVIDER RESPONSE TEXT:    unknown    Query created by: Amina Torrez on 2025 10:10 AM      Electronically signed by:  Bruce Monahan MD 2025 10:36 AM
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
## 2025-07-10 ENCOUNTER — APPOINTMENT (OUTPATIENT)
Facility: HOSPITAL | Age: 85
End: 2025-07-10
Payer: MEDICARE

## 2025-07-10 ENCOUNTER — HOSPITAL ENCOUNTER (EMERGENCY)
Facility: HOSPITAL | Age: 85
Discharge: HOME | End: 2025-07-10
Attending: EMERGENCY MEDICINE
Payer: MEDICARE

## 2025-07-10 VITALS
DIASTOLIC BLOOD PRESSURE: 66 MMHG | BODY MASS INDEX: 25.68 KG/M2 | OXYGEN SATURATION: 94 % | TEMPERATURE: 97.7 F | WEIGHT: 154.32 LBS | SYSTOLIC BLOOD PRESSURE: 145 MMHG | HEART RATE: 80 BPM | RESPIRATION RATE: 16 BRPM

## 2025-07-10 DIAGNOSIS — N39.0 ACUTE UTI: ICD-10-CM

## 2025-07-10 DIAGNOSIS — R53.1 GENERALIZED WEAKNESS: Primary | ICD-10-CM

## 2025-07-10 DIAGNOSIS — K44.9 HIATAL HERNIA: ICD-10-CM

## 2025-07-10 LAB
ALBUMIN SERPL-MCNC: 2.8 G/DL (ref 3.5–5)
ALBUMIN/GLOB SERPL: 0.9 (ref 1.1–2.2)
ALP SERPL-CCNC: 144 U/L (ref 45–117)
ALT SERPL-CCNC: 16 U/L (ref 12–78)
ANION GAP SERPL CALC-SCNC: 5 MMOL/L (ref 2–12)
APPEARANCE UR: CLEAR
AST SERPL-CCNC: 27 U/L (ref 15–37)
BACTERIA URNS QL MICRO: ABNORMAL /HPF
BASOPHILS # BLD: 0.07 K/UL (ref 0–0.1)
BASOPHILS NFR BLD: 1 % (ref 0–1)
BILIRUB SERPL-MCNC: 0.9 MG/DL (ref 0.2–1)
BILIRUB UR QL: NEGATIVE
BUN SERPL-MCNC: 22 MG/DL (ref 6–20)
BUN/CREAT SERPL: 21 (ref 12–20)
CALCIUM SERPL-MCNC: 8.2 MG/DL (ref 8.5–10.1)
CHLORIDE SERPL-SCNC: 109 MMOL/L (ref 97–108)
CO2 SERPL-SCNC: 28 MMOL/L (ref 21–32)
COLOR UR: ABNORMAL
CREAT SERPL-MCNC: 1.04 MG/DL (ref 0.55–1.02)
DIFFERENTIAL METHOD BLD: ABNORMAL
EKG ATRIAL RATE: 72 BPM
EKG DIAGNOSIS: NORMAL
EKG P AXIS: 38 DEGREES
EKG P-R INTERVAL: 168 MS
EKG Q-T INTERVAL: 458 MS
EKG QRS DURATION: 116 MS
EKG QTC CALCULATION (BAZETT): 501 MS
EKG R AXIS: -22 DEGREES
EKG T AXIS: 28 DEGREES
EKG VENTRICULAR RATE: 72 BPM
EOSINOPHIL # BLD: 0.23 K/UL (ref 0–0.4)
EOSINOPHIL NFR BLD: 3.2 % (ref 0–7)
EPITH CASTS URNS QL MICRO: ABNORMAL /LPF
ERYTHROCYTE [DISTWIDTH] IN BLOOD BY AUTOMATED COUNT: 25.5 % (ref 11.5–14.5)
GLOBULIN SER CALC-MCNC: 3.2 G/DL (ref 2–4)
GLUCOSE SERPL-MCNC: 101 MG/DL (ref 65–100)
GLUCOSE UR STRIP.AUTO-MCNC: NEGATIVE MG/DL
HCT VFR BLD AUTO: 31.5 % (ref 35–47)
HGB BLD-MCNC: 9.1 G/DL (ref 11.5–16)
HGB UR QL STRIP: NEGATIVE
IMM GRANULOCYTES # BLD AUTO: 0.04 K/UL (ref 0–0.04)
IMM GRANULOCYTES NFR BLD AUTO: 0.6 % (ref 0–0.5)
KETONES UR QL STRIP.AUTO: NEGATIVE MG/DL
LEUKOCYTE ESTERASE UR QL STRIP.AUTO: ABNORMAL
LYMPHOCYTES # BLD: 1.69 K/UL (ref 0.8–3.5)
LYMPHOCYTES NFR BLD: 23.5 % (ref 12–49)
MCH RBC QN AUTO: 24.1 PG (ref 26–34)
MCHC RBC AUTO-ENTMCNC: 28.9 G/DL (ref 30–36.5)
MCV RBC AUTO: 83.6 FL (ref 80–99)
MONOCYTES # BLD: 0.81 K/UL (ref 0–1)
MONOCYTES NFR BLD: 11.3 % (ref 5–13)
NEUTS SEG # BLD: 4.36 K/UL (ref 1.8–8)
NEUTS SEG NFR BLD: 60.4 % (ref 32–75)
NITRITE UR QL STRIP.AUTO: NEGATIVE
NRBC # BLD: 0 K/UL (ref 0–0.01)
NRBC BLD-RTO: 0 PER 100 WBC
PH UR STRIP: 7 (ref 5–8)
PLATELET # BLD AUTO: 278 K/UL (ref 150–400)
PMV BLD AUTO: 9.9 FL (ref 8.9–12.9)
POTASSIUM SERPL-SCNC: 4.2 MMOL/L (ref 3.5–5.1)
PROT SERPL-MCNC: 6 G/DL (ref 6.4–8.2)
PROT UR STRIP-MCNC: NEGATIVE MG/DL
RBC # BLD AUTO: 3.77 M/UL (ref 3.8–5.2)
RBC #/AREA URNS HPF: ABNORMAL /HPF (ref 0–5)
RBC MORPH BLD: ABNORMAL
SODIUM SERPL-SCNC: 142 MMOL/L (ref 136–145)
SP GR UR REFRACTOMETRY: 1.01
TROPONIN I SERPL HS-MCNC: 6 NG/L (ref 0–51)
URINE CULTURE IF INDICATED: ABNORMAL
UROBILINOGEN UR QL STRIP.AUTO: 1 EU/DL (ref 0.2–1)
WBC # BLD AUTO: 7.2 K/UL (ref 3.6–11)
WBC URNS QL MICRO: ABNORMAL /HPF (ref 0–4)

## 2025-07-10 PROCEDURE — 71045 X-RAY EXAM CHEST 1 VIEW: CPT

## 2025-07-10 PROCEDURE — 93005 ELECTROCARDIOGRAM TRACING: CPT | Performed by: EMERGENCY MEDICINE

## 2025-07-10 PROCEDURE — 84484 ASSAY OF TROPONIN QUANT: CPT

## 2025-07-10 PROCEDURE — 36415 COLL VENOUS BLD VENIPUNCTURE: CPT

## 2025-07-10 PROCEDURE — 81001 URINALYSIS AUTO W/SCOPE: CPT

## 2025-07-10 PROCEDURE — 87086 URINE CULTURE/COLONY COUNT: CPT

## 2025-07-10 PROCEDURE — 2580000003 HC RX 258: Performed by: EMERGENCY MEDICINE

## 2025-07-10 PROCEDURE — 80053 COMPREHEN METABOLIC PANEL: CPT

## 2025-07-10 PROCEDURE — 6370000000 HC RX 637 (ALT 250 FOR IP): Performed by: EMERGENCY MEDICINE

## 2025-07-10 PROCEDURE — 85025 COMPLETE CBC W/AUTO DIFF WBC: CPT

## 2025-07-10 PROCEDURE — 99285 EMERGENCY DEPT VISIT HI MDM: CPT

## 2025-07-10 RX ORDER — 0.9 % SODIUM CHLORIDE 0.9 %
500 INTRAVENOUS SOLUTION INTRAVENOUS ONCE
Status: COMPLETED | OUTPATIENT
Start: 2025-07-10 | End: 2025-07-10

## 2025-07-10 RX ORDER — SULFAMETHOXAZOLE AND TRIMETHOPRIM 800; 160 MG/1; MG/1
1 TABLET ORAL 2 TIMES DAILY
Qty: 6 TABLET | Refills: 0 | Status: SHIPPED | OUTPATIENT
Start: 2025-07-10 | End: 2025-07-13

## 2025-07-10 RX ORDER — SULFAMETHOXAZOLE AND TRIMETHOPRIM 800; 160 MG/1; MG/1
1 TABLET ORAL
Status: COMPLETED | OUTPATIENT
Start: 2025-07-10 | End: 2025-07-10

## 2025-07-10 RX ORDER — NITROFURANTOIN 25; 75 MG/1; MG/1
100 CAPSULE ORAL 2 TIMES DAILY
Qty: 20 CAPSULE | Refills: 0 | Status: CANCELLED | OUTPATIENT
Start: 2025-07-10 | End: 2025-07-20

## 2025-07-10 RX ADMIN — SODIUM CHLORIDE 500 ML: 900 INJECTION, SOLUTION INTRAVENOUS at 15:23

## 2025-07-10 RX ADMIN — SULFAMETHOXAZOLE AND TRIMETHOPRIM 1 TABLET: 800; 160 TABLET ORAL at 19:50

## 2025-07-10 ASSESSMENT — PAIN SCALES - GENERAL: PAINLEVEL_OUTOF10: 0

## 2025-07-10 ASSESSMENT — PAIN - FUNCTIONAL ASSESSMENT: PAIN_FUNCTIONAL_ASSESSMENT: 0-10

## 2025-07-10 NOTE — ED NOTES
Patient is discharged to home at this time. Pt is awake, alert, and oriented.  Respirations are normal with no current sign of distress.  RN reviewed discharge instructions with the patient, and pt and family verbalized understanding. All of the patients questions and concerns were addressed. The patient is discharged with papers in hand. Patient and family were made aware of prescription(s) called into pharmacy for .

## 2025-07-10 NOTE — DISCHARGE INSTRUCTIONS
Thank You!    It was a pleasure taking care of you in our Emergency Department today. We know that when you come to our Emergency Department, you are entrusting us with your health, comfort, and safety. Our physicians and nurses honor that trust, and truly appreciate the opportunity to care for you and your loved ones.      We also value your feedback. If you receive a survey about your Emergency Department experience today, please fill it out.  We care about our patients' feedback, and we listen to what you have to say.  Thank you.    Mark Lim MD  ________________________________________________________________________  I have included a copy of your lab results and/or radiologic studies from today's visit so you can have them easily available at your follow-up visit. We hope you feel better and please do not hesitate to contact the ED if you have any questions at all!    Recent Results (from the past 12 hours)   CBC with Auto Differential    Collection Time: 07/10/25  2:59 PM   Result Value Ref Range    WBC 7.2 3.6 - 11.0 K/uL    RBC 3.77 (L) 3.80 - 5.20 M/uL    Hemoglobin 9.1 (L) 11.5 - 16.0 g/dL    Hematocrit 31.5 (L) 35.0 - 47.0 %    MCV 83.6 80.0 - 99.0 FL    MCH 24.1 (L) 26.0 - 34.0 PG    MCHC 28.9 (L) 30.0 - 36.5 g/dL    RDW 25.5 (H) 11.5 - 14.5 %    Platelets 278 150 - 400 K/uL    MPV 9.9 8.9 - 12.9 FL    Nucleated RBCs 0.0 0  WBC    nRBC 0.00 0.00 - 0.01 K/uL    Neutrophils % 60.4 32.0 - 75.0 %    Lymphocytes % 23.5 12.0 - 49.0 %    Monocytes % 11.3 5.0 - 13.0 %    Eosinophils % 3.2 0.0 - 7.0 %    Basophils % 1.0 0.0 - 1.0 %    Immature Granulocytes % 0.6 (H) 0.0 - 0.5 %    Neutrophils Absolute 4.36 1.80 - 8.00 K/UL    Lymphocytes Absolute 1.69 0.80 - 3.50 K/UL    Monocytes Absolute 0.81 0.00 - 1.00 K/UL    Eosinophils Absolute 0.23 0.00 - 0.40 K/UL    Basophils Absolute 0.07 0.00 - 0.10 K/UL    Immature Granulocytes Absolute 0.04 0.00 - 0.04 K/UL    Differential Type SMEAR SCANNED      RBC

## 2025-07-11 NOTE — ED PROVIDER NOTES
[facility] EMERGENCY DEPARTMENT  EMERGENCY DEPARTMENT ENCOUNTER       Pt Name: Cristal Hensley  MRN: [id number]  Birthdate 1940  Date of evaluation: 7/10/2025  Provider: Mark Lim MD   PCP: Nick Lassiter MD  Note Started: 8:05 PM 7/10/25     CHIEF COMPLAINT       Chief Complaint   Patient presents with    Hypotension     [facility] with complaints of low BP. EMS gave IVF to help BP. C/O feeling tired and generalized weakness. Pt was dc two days ago. A&O x4.     Fatigue    Extremity Weakness   \  HISTORY OF PRESENT ILLNESS: 1 or more elements      History From: Patient, History limited by: None     Cristal Hensley is a 84 y.o. female with a history of reflux who presents with prehospital hypotension.  Patient reports she feels relatively well, chronically fatigued with no acute worsening.  She denies pain to her chest or abdomen, no nausea or vomiting or diarrhea.  Denies changes to urination without burning burning dysuria.  She was given prehospital fluids with improved blood pressure.    Nursing Notes were all reviewed and agreed with or any disagreements were addressed in the HPI.     REVIEW OF SYSTEMS        Positives and Pertinent negatives as per HPI.    PAST HISTORY     Past Medical History:  Past Medical History:   Diagnosis Date    Chronic pain     GERD (gastroesophageal reflux disease)     Hypercholesteremia     Hypertension     Ill-defined condition     DJD    Ill-defined condition     Diverticulosis       Past Surgical History:  Past Surgical History:   Procedure Laterality Date    COLONOSCOPY N/A 8/20/2024    COLONOSCOPY/BIOPSY/POLYPECTOMY performed by Andrea Lama Jr., MD at [facility] ENDOSCOPY    DILATION AND CURETTAGE OF UTERUS      HEENT  2001    Oral surgery - gum disease    ORTHOPEDIC SURGERY Right 2006    Trigger finger repair    UPPER GASTROINTESTINAL ENDOSCOPY N/A 8/20/2024    ESOPHAGOGASTRODUODENOSCOPY WITH BIOPSY, COLONOSCOPY/BIOPSY/POLYPECTOMY performed

## 2025-07-13 LAB
BACTERIA SPEC CULT: ABNORMAL
BACTERIA SPEC CULT: ABNORMAL
CC UR VC: ABNORMAL
SERVICE CMNT-IMP: ABNORMAL

## 2025-07-15 NOTE — DISCHARGE INSTRUCTIONS
What to do at Home:  Recommended activity: Activity as tolerated,     If you experience any of the following symptoms like thoughts of harming yourself, poor sleep,feeling overwhelmed with anxiety, sadness or hoplessness, please follow up with the [facility] or call 911. *  Please give a list of your current medications to your Primary Care Provider. *  Please update this list whenever your medications are discontinued, doses are      changed, or new medications (including over-the-counter products) are added. *  Please carry medication information at all times in case of emergency situations. These are general instructions for a healthy lifestyle:    No smoking/ No tobacco products/ Avoid exposure to second hand smoke  Surgeon General's Warning:  Quitting smoking now greatly reduces serious risk to your health. Obesity, smoking, and sedentary lifestyle greatly increases your risk for illness    A healthy diet, regular physical exercise & weight monitoring are important for maintaining a healthy lifestyle    You may be retaining fluid if you have a history of heart failure or if you experience any of the following symptoms:  Weight gain of 3 pounds or more overnight or 5 pounds in a week, increased swelling in our hands or feet or shortness of breath while lying flat in bed. Please call your doctor as soon as you notice any of these symptoms; do not wait until your next office visit. The discharge information has been reviewed with the patient. The patient verbalized understanding. Discharge medications reviewed with the patient and appropriate educational materials and side effects teaching were provided.   ___________________________________________________________________________________________________________________________________DISCHARGE SUMMARY    NAME:Tanya Perez  : 1940  MRN: [id number]    The patient Will Stank exhibits the ability to control behavior in a less restrictive environment. Patient's level of functioning is improving. No assaultive/destructive behavior has been observed for the past 24 hours. No suicidal/homicidal threat or behavior has been observed for the past 24 hours. There is no evidence of serious medication side effects. Patient has not been in physical or protective restraints for at least the past 24 hours. If weapons involved, how are they secured? No weapons involved   Is patient aware of and in agreement with discharge plan? She is aware of discharge and is in agreement     Arrangements for medication:  Prescriptions given to patient . Copy of discharge instructions to provider?: yes, fax to [phone] [facility] for transportation home:  Daughter to pickup     Keep all follow up appointments as scheduled, continue to take prescribed medications per physician instructions. Mental health crisis number:  113 or your local mental health crisis line number at [address] [facility] Emergency WARM LINE      2-598-041-MHAV ([phone])      M-F: 9am to 9pm      Sat & Sun: 5pm - 9pm  National suicide prevention lines:                             1-894-GCUWRGQ (5-852-266-860-583-8403)       2-033-681-TALK (4-408-778-239-480-4326)   24/7 Crisis Text Line:  Text HOME to [address] [email or web address]    Thank you for requesting access to [address] Please follow the instructions below to securely access and download your online medical record. [employer] allows you to send messages to your doctor, view your test results, renew your prescriptions, schedule appointments, and more. How Do I Sign Up? 1. In your internet browser, go to [email or web address]  2. Click on the First Time User? Click Here link in the Sign In box. You will be redirect to the New Member Sign Up page. 3. Enter your [employer] Access Code exactly as it appears below. You will not need to use this code after youve completed the sign-up process.  If you do not sign up before the expiration date, you must request a new code. [employer] Access Code: [email or web address]  Expires: 2022 11:59 AM (This is the date your [employer] access code will )    4. Enter the last four digits of your Social Security Number (xxxx) and Date of Birth (mm/dd/yyyy) as indicated and click Submit. You will be taken to the next sign-up page. 5. Create a [employer] ID. This will be your [employer] login ID and cannot be changed, so think of one that is secure and easy to remember. 6. Create a [employer] password. You can change your password at any time. 7. Enter your Password Reset Question and Answer. This can be used at a later time if you forget your password. 8. Enter your e-mail address. You will receive e-mail notification when new information is available in [address] 9. Click Sign Up. You can now view and download portions of your medical record. 10. Click the Download Summary menu link to download a portable copy of your medical information. Additional Information    If you have questions, please visit the Frequently Asked Questions section of the [employer] website at [email or web address] Remember, [employer] is NOT to be used for urgent needs. For medical emergencies, dial 911. Patient has no objection to blood transfusions.

## 2025-08-22 ENCOUNTER — OFFICE VISIT (OUTPATIENT)
Age: 85
End: 2025-08-22
Payer: MEDICARE

## 2025-08-22 VITALS
WEIGHT: 147 LBS | HEART RATE: 91 BPM | DIASTOLIC BLOOD PRESSURE: 78 MMHG | OXYGEN SATURATION: 97 % | BODY MASS INDEX: 24.49 KG/M2 | SYSTOLIC BLOOD PRESSURE: 118 MMHG | HEIGHT: 65 IN

## 2025-08-22 DIAGNOSIS — R06.02 SHORTNESS OF BREATH: Primary | ICD-10-CM

## 2025-08-22 DIAGNOSIS — D64.9 ANEMIA, UNSPECIFIED TYPE: ICD-10-CM

## 2025-08-22 DIAGNOSIS — R06.02 SHORTNESS OF BREATH: ICD-10-CM

## 2025-08-22 LAB
COMMENT:: NORMAL
SPECIMEN HOLD: NORMAL

## 2025-08-22 PROCEDURE — 1036F TOBACCO NON-USER: CPT | Performed by: NURSE PRACTITIONER

## 2025-08-22 PROCEDURE — 1126F AMNT PAIN NOTED NONE PRSNT: CPT | Performed by: NURSE PRACTITIONER

## 2025-08-22 PROCEDURE — G8427 DOCREV CUR MEDS BY ELIG CLIN: HCPCS | Performed by: NURSE PRACTITIONER

## 2025-08-22 PROCEDURE — 3074F SYST BP LT 130 MM HG: CPT | Performed by: NURSE PRACTITIONER

## 2025-08-22 PROCEDURE — 1159F MED LIST DOCD IN RCRD: CPT | Performed by: NURSE PRACTITIONER

## 2025-08-22 PROCEDURE — G8399 PT W/DXA RESULTS DOCUMENT: HCPCS | Performed by: NURSE PRACTITIONER

## 2025-08-22 PROCEDURE — 99214 OFFICE O/P EST MOD 30 MIN: CPT | Performed by: NURSE PRACTITIONER

## 2025-08-22 PROCEDURE — 1090F PRES/ABSN URINE INCON ASSESS: CPT | Performed by: NURSE PRACTITIONER

## 2025-08-22 PROCEDURE — 1123F ACP DISCUSS/DSCN MKR DOCD: CPT | Performed by: NURSE PRACTITIONER

## 2025-08-22 PROCEDURE — G8420 CALC BMI NORM PARAMETERS: HCPCS | Performed by: NURSE PRACTITIONER

## 2025-08-22 PROCEDURE — 3078F DIAST BP <80 MM HG: CPT | Performed by: NURSE PRACTITIONER

## 2025-08-22 RX ORDER — FUROSEMIDE 40 MG/1
40 TABLET ORAL DAILY
COMMUNITY
Start: 2025-08-07

## 2025-08-22 ASSESSMENT — PATIENT HEALTH QUESTIONNAIRE - PHQ9
SUM OF ALL RESPONSES TO PHQ QUESTIONS 1-9: 0
1. LITTLE INTEREST OR PLEASURE IN DOING THINGS: NOT AT ALL
SUM OF ALL RESPONSES TO PHQ QUESTIONS 1-9: 0
2. FEELING DOWN, DEPRESSED OR HOPELESS: NOT AT ALL

## 2025-08-23 LAB
ALBUMIN SERPL-MCNC: 3.6 G/DL (ref 3.5–5.2)
ALBUMIN/GLOB SERPL: 1.1 (ref 1.1–2.2)
ALP SERPL-CCNC: 152 U/L (ref 35–104)
ALT SERPL-CCNC: 15 U/L (ref 10–35)
ANION GAP SERPL CALC-SCNC: 13 MMOL/L (ref 2–14)
AST SERPL-CCNC: 20 U/L (ref 10–35)
BILIRUB SERPL-MCNC: 0.6 MG/DL (ref 0–1.2)
BUN SERPL-MCNC: 17 MG/DL (ref 8–23)
BUN/CREAT SERPL: 22 (ref 12–20)
CALCIUM SERPL-MCNC: 9.3 MG/DL (ref 8.8–10.2)
CHLORIDE SERPL-SCNC: 104 MMOL/L (ref 98–107)
CO2 SERPL-SCNC: 27 MMOL/L (ref 20–29)
CREAT SERPL-MCNC: 0.78 MG/DL (ref 0.6–1)
ERYTHROCYTE [DISTWIDTH] IN BLOOD BY AUTOMATED COUNT: 21 % (ref 11.5–14.5)
GLOBULIN SER CALC-MCNC: 3.2 G/DL (ref 2–4)
GLUCOSE SERPL-MCNC: 112 MG/DL (ref 65–100)
HCT VFR BLD AUTO: 41.3 % (ref 35–47)
HGB BLD-MCNC: 12.9 G/DL (ref 11.5–16)
MAGNESIUM SERPL-MCNC: 1.8 MG/DL (ref 1.6–2.4)
MCH RBC QN AUTO: 27 PG (ref 26–34)
MCHC RBC AUTO-ENTMCNC: 31.2 G/DL (ref 30–36.5)
MCV RBC AUTO: 86.6 FL (ref 80–99)
NRBC # BLD: 0 K/UL (ref 0–0.01)
NRBC BLD-RTO: 0 PER 100 WBC
NT PRO BNP: 72 PG/ML (ref 0–450)
PLATELET # BLD AUTO: 258 K/UL (ref 150–400)
PMV BLD AUTO: 10.2 FL (ref 8.9–12.9)
POTASSIUM SERPL-SCNC: 3.7 MMOL/L (ref 3.5–5.1)
PROT SERPL-MCNC: 6.8 G/DL (ref 6.4–8.3)
RBC # BLD AUTO: 4.77 M/UL (ref 3.8–5.2)
SODIUM SERPL-SCNC: 143 MMOL/L (ref 136–145)
WBC # BLD AUTO: 7.7 K/UL (ref 3.6–11)

## (undated) DEVICE — IV START KIT: Brand: MEDLINE

## (undated) DEVICE — CONTAINER SPEC 20 ML LID NEUT BUFF FORMALIN 10 % POLYPR STS

## (undated) DEVICE — Device

## (undated) DEVICE — 1200 GUARD II KIT W/5MM TUBE W/O VAC TUBE: Brand: GUARDIAN

## (undated) DEVICE — FORCEPS BX L160CM DIA8MM GRSP DISECT CUP TIP NONLOCKING ROT

## (undated) DEVICE — BITEBLOCK 54FR W/ DENT RIM BLOX

## (undated) DEVICE — SOLIDIFIER FLD 2OZ 1500CC N DISINF IN BTL DISP SAFESORB

## (undated) DEVICE — TIP SUCT TRNSPAR RIB SURF STD BLB RIG NVENT W/ 5IN1 CONN DYND50138] MEDLINE INDUSTRIES INC]

## (undated) DEVICE — SYR 10ML LUER LOK 1/5ML GRAD --

## (undated) DEVICE — BLOCK BITE ENDOSCP AD 21 MM W/ DIL BLU LF DISP

## (undated) DEVICE — FORCEPS BX L240CM JAW DIA2.4MM ORNG L CAP W/ NDL DISP RAD

## (undated) DEVICE — CATHETER IV 20GA L1.16IN OD1.0414-1.1176MM ID0.762-0.8382MM

## (undated) DEVICE — NEONATAL-ADULT SPO2 SENSOR: Brand: NELLCOR

## (undated) DEVICE — ENDOSCOPIC KIT COMPLIANCE ENDOKIT

## (undated) DEVICE — YANKAUER,TAPERED BULBOUS TIP,W/O VENT: Brand: MEDLINE

## (undated) DEVICE — Z DISCONTINUED PER MEDLINE LINE GAS SAMPLING O2/CO2 LNG AD 13 FT NSL W/ TBNG FILTERLINE

## (undated) DEVICE — SYR 3ML LL TIP 1/10ML GRAD --

## (undated) DEVICE — ELECTRODE,RADIOTRANSLUCENT,FOAM,5PK: Brand: MEDLINE

## (undated) DEVICE — SET GRAV CK VLV NEEDLESS ST 3 GANGED 4WAY STPCOCK HI FLO 10

## (undated) DEVICE — BAG SPEC BIOHZRD 10 X 10 IN --

## (undated) DEVICE — COVIDIEN KENDALL DL DISPOSABLE 3 LEAD SY: Brand: MEDLINE RENEWAL

## (undated) DEVICE — CATH IV AUTOGRD BC PNK 20GA 25 -- INSYTE

## (undated) DEVICE — NEEDLE HYPO 18GA L1.5IN PNK S STL HUB POLYPR SHLD REG BVL

## (undated) DEVICE — CUFF BLD PRSS AD CLTH SGL TB W/ BAYNT CONN ROUNDED CORNER

## (undated) DEVICE — TOWEL 4 PLY TISS 19X30 SUE WHT

## (undated) DEVICE — BASIN EMSIS 16OZ GRAPHITE PLAS KID SHP MOLD GRAD FOR ORAL

## (undated) DEVICE — SET ADMIN 16ML TBNG L100IN 2 Y INJ SITE IV PIGGY BK DISP